# Patient Record
Sex: MALE | Race: BLACK OR AFRICAN AMERICAN | Employment: OTHER | URBAN - METROPOLITAN AREA
[De-identification: names, ages, dates, MRNs, and addresses within clinical notes are randomized per-mention and may not be internally consistent; named-entity substitution may affect disease eponyms.]

---

## 2017-10-27 ENCOUNTER — OFFICE VISIT (OUTPATIENT)
Dept: CARDIOLOGY CLINIC | Age: 32
End: 2017-10-27

## 2017-10-27 ENCOUNTER — CLINICAL SUPPORT (OUTPATIENT)
Dept: CARDIOLOGY CLINIC | Age: 32
End: 2017-10-27

## 2017-10-27 VITALS
RESPIRATION RATE: 18 BRPM | OXYGEN SATURATION: 96 % | BODY MASS INDEX: 47.46 KG/M2 | HEART RATE: 96 BPM | SYSTOLIC BLOOD PRESSURE: 108 MMHG | WEIGHT: 303 LBS | DIASTOLIC BLOOD PRESSURE: 78 MMHG

## 2017-10-27 VITALS — DIASTOLIC BLOOD PRESSURE: 72 MMHG | SYSTOLIC BLOOD PRESSURE: 120 MMHG

## 2017-10-27 DIAGNOSIS — R07.89 CHEST DISCOMFORT: Primary | ICD-10-CM

## 2017-10-27 DIAGNOSIS — E66.01 MORBIDLY OBESE (HCC): ICD-10-CM

## 2017-10-27 DIAGNOSIS — R07.9 CHEST PAIN, UNSPECIFIED TYPE: Primary | ICD-10-CM

## 2017-10-27 NOTE — MR AVS SNAPSHOT
Visit Information Date & Time Provider Department Dept. Phone Encounter #  
 10/27/2017  1:20 PM Simba Hayes MD [facility] [phone] [id number] Your Appointments 11/9/2017  3:00 PM  
ECHO CARDIOGRAMS 2D with YFN PAREKH  
[facility] ([facility]) Appt Note: STRESS ECHO PER  [address] [place] [address]  
[phone]  
  
   
 [address] [facility] [address] [facility] [address]  
  
    
 11/9/2017  3:00 PM  
STRESS ECHOCARDIOGRAMS with NANCY NEVAREZ  
[facility] ([facility]) Appt Note: STRESS ECHO PER  [address] [place] [address] [facility] [address] [address] [address] Upcoming Health Maintenance Date Due DTaP/Tdap/Td series (1 - Tdap) 1/21/2005 INFLUENZA AGE 9 TO ADULT 8/1/2017 Allergies as of 10/27/2017  Review Complete On: 10/27/2017 By: Jorge Dodson RN No Known Allergies Current Immunizations  Never Reviewed No immunizations on file. Not reviewed this visit Vitals BP Pulse Resp Weight(growth percentile) SpO2 BMI  
 108/78 96 18 303 lb (137.4 kg) 96% 47.46 kg/m2 Smoking Status Never Smoker BMI and BSA Data Body Mass Index Body Surface Area  
 47.46 kg/m 2 2.55 m 2 Preferred Pharmacy Pharmacy Name Phone [id number]- [facility] [address] [phone] Your Updated Medication List  
  
Notice  As of 10/27/2017  1:45 PM  
 You have not been prescribed any medications. Introducing [facility]! Mikel Nelson introduces [employer] patient portal. Now you can access parts of your medical record, email your doctor's office, and request medication refills online. 1. In your internet browser, go to [email or web address] 2. Click on the First Time User? Click Here link in the Sign In box. You will see the New Member Sign Up page. 3. Enter your Spurfly Access Code exactly as it appears below. You will not need to use this code after youve completed the sign-up process. If you do not sign up before the expiration date, you must request a new code. · Spurfly Access Code: [email or web address] Expires: 1/25/2018 11:44 AM 
 
4. Enter the last four digits of your Social Security Number (xxxx) and Date of Birth (mm/dd/yyyy) as indicated and click Submit. You will be taken to the next sign-up page. 5. Create a RightsFlowt ID. This will be your Spurfly login ID and cannot be changed, so think of one that is secure and easy to remember. 6. Create a Spurfly password. You can change your password at any time. 7. Enter your Password Reset Question and Answer. This can be used at a later time if you forget your password. 8. Enter your e-mail address. You will receive e-mail notification when new information is available in [address] 9. Click Sign Up. You can now view and download portions of your medical record. 10. Click the Download Summary menu link to download a portable copy of your medical information. If you have questions, please visit the Frequently Asked Questions section of the Spurfly website. Remember, Spurfly is NOT to be used for urgent needs. For medical emergencies, dial 911. Now available from your iPhone and Android! Please provide this summary of care documentation to your next provider. Your primary care clinician is listed as Emeka Avalos. If you have any questions after today's visit, please call [phone].

## 2017-10-27 NOTE — PROGRESS NOTES
[address]  [address] Sahil Carlos, Jr [place], [address] [facility]    Office [phone]  Fax [phone]  Cell [phone]        Shanice Kay is a 34 y.o. male referred for evaluation of chest pain. Consult requested by PAULETTE Quintanilla. Assessment  Encounter Diagnoses   Name Primary?  Chest pain, unspecified type Yes    Morbidly obese (HCC)        Recommendations:    Shanice Kay has exertional chest tightness in the setting of obesity but otherwise no other coronary risk factors at a young age. ETT today was objectively normal but subjectively abnormal with similar chest discomfort. His exam and EKG are unremarkable. Will evaluate further with stress echo to exclude HCM and myocardial ischemia due to coronary anomaly. If this is normal, I would encourage continued exercise and weight loss. He may have microvascular dysfunction. Phone follow up after reviewing tests    Subjective:    No prior cardiac hx. Suffers from obesity and trying to lose weight thru personal training. Patient noticed chest pain and palpitations onset 1 week ago while exercising. He subsequently rested and then started exercising again with recurrence of sxs as well as dizziness. He has since had residual, intermittent sharp chest pain. Pain is not worse with deep breathing. He had similar, less intense chest pain near the end of ETT today which resolved after resting. He did not have palpitations or dizziness during stress test.    He had been exercising thrice weekly with a  for the past 6 months but has started to increase the intensity and has noticed that he fatigues much faster that before. He also has noticed LAU walking up stairs. Patient does not take any medications. He does not smoke or drink. He denies a hx of sleep apnea. He does not believe he has a family hx of CAD but notes that his daughter has a heart murmur.  He denies any hx of asthma, airway issues, or GERD. Patient denies any  syncope, orthopnea, edema or paroxysmal nocturnal dyspnea. Patient is a [facility] [employer] .     Patient has beta thalassemia minor and states he was anemic by blood test 1 week ago. Cardiac risk factors   HTN no  DM no  Smoking no  Family hx of CAD no    Cardiac testing  ETT 10/27/17 - 6 minutes, normal study    Past Medical History:   Diagnosis Date    Beta thalassemia minor             No Known Allergies       Review of Systems  Constitutional: Negative for fever, chills, and diaphoresis. +exertional fatigue  Respiratory: Negative for cough, hemoptysis, sputum production, shortness of breath and wheezing. Cardiovascular: Negative for orthopnea, claudication, leg swelling and PND. +intermittent chest pain. +episode of palpitaitons  Gastrointestinal: Negative for heartburn, nausea, vomiting, blood in stool and melena. Genitourinary: Negative for dysuria and flank pain. Musculoskeletal: Negative for joint pain and back pain. Skin: Negative for rash. Neurological: Negative for focal weakness, seizures, loss of consciousness, weakness and headaches. +episode of dizziness  Endo/Heme/Allergies: Does not bruise/bleed easily. Psychiatric/Behavioral: Negative for memory loss. The patient does not have insomnia. Physical Exam    Visit Vitals    /78    Pulse 96    Resp 18    Wt 303 lb (137.4 kg)    SpO2 96%    BMI 47.46 kg/m2     Wt Readings from Last 3 Encounters:   10/27/17 303 lb (137.4 kg)   11/20/13 295 lb (133.8 kg)      General - well developed well nourished, morbidly obese BM  Neck - JVP normal, thyroid nl  Cardiac - normal S1, S2, no murmurs, rubs or gallops.  No clicks  Vascular - carotids without bruits, radials, femorals and pedal pulses equal bilateral  Lungs - clear to auscultation bilaterals, no rales, wheezing or rhonchi  Abd - soft nontender, no HSM, no abd bruits  Extremities - no edema  Skin - no rash  Neuro - nonfocal  Psych - normal mood and affect      Cardiographics  ETT 10/27/17 - 6 minutes, normal study  EKG 10/27/17- SR, normal    Written by Alberto Saldivar, as dictated by Wes Schultz MD.  Wes Schultz MD

## 2017-10-27 NOTE — PROGRESS NOTES
Explained procedure to patient, monitoring EKG/HR/BP, walking on treadmill,  and risks/discomforts. All concerns and questions addressed. Consent obtained. Pt works out with a . A few days ago experienced some dull chest pain, palpitations, and dizziness. Pt complained of a dull left sided chest pain at peak exercise (8.0METS), which subsided at rest. Pt SpO2 at rest 98%, peak exercise 95%, returned to 98% in recovery. See scanned report. Dr. Vazquez Elkins ordered and Dr. Vazquez Elkins read study. ID verified per protocol. Pt  reported no symptoms at completion of protocol.

## 2017-11-09 ENCOUNTER — CLINICAL SUPPORT (OUTPATIENT)
Dept: CARDIOLOGY CLINIC | Age: 32
End: 2017-11-09

## 2017-11-09 DIAGNOSIS — E66.01 MORBIDLY OBESE (HCC): ICD-10-CM

## 2017-11-09 DIAGNOSIS — R07.9 CHEST PAIN, UNSPECIFIED TYPE: Primary | ICD-10-CM

## 2017-11-09 NOTE — PROGRESS NOTES
See scanned report. Dr. Julissa Spain ordered study and Dr. Julissa Spain read study. ID verified per protocol. Test and risks explained. Consent signed after all patient questions answered. Patient developed dyspnea during test. At 4 minutes in recovery, patient without symptoms or complaints voiced.

## 2017-11-13 ENCOUNTER — TELEPHONE (OUTPATIENT)
Dept: CARDIOLOGY CLINIC | Age: 32
End: 2017-11-13

## 2017-11-13 NOTE — TELEPHONE ENCOUNTER
Cardiac testing  ETT 10/27/17 - 6 minutes, normal study  Exercise Stress Echo 11/9/17 - 6:33. Normal study. EF 60%. Mr. Patricia Garcia was seen as a new patient by Dr. Beth Leong on 10/27/17 for evaluation of chest pain. Referred by PAULETTE Guaman. I have notified him that the Exercise stress Echo showed normal wall motion and normal EF. No concern for myocardial ischemia. I have encouraged him to continue to exercise and follow a heart healthy diet in order to optimize weight loss. Follow up with his PCP on a regular basis.

## 2019-10-01 ENCOUNTER — OFFICE VISIT (OUTPATIENT)
Dept: SLEEP MEDICINE | Age: 34
End: 2019-10-01

## 2019-10-01 VITALS
OXYGEN SATURATION: 97 % | WEIGHT: 315 LBS | RESPIRATION RATE: 16 BRPM | HEART RATE: 79 BPM | BODY MASS INDEX: 49.44 KG/M2 | HEIGHT: 67 IN | DIASTOLIC BLOOD PRESSURE: 78 MMHG | SYSTOLIC BLOOD PRESSURE: 118 MMHG

## 2019-10-01 DIAGNOSIS — G47.33 OSA (OBSTRUCTIVE SLEEP APNEA): Primary | ICD-10-CM

## 2019-10-01 DIAGNOSIS — E66.2 HYPOVENTILATION ASSOCIATED WITH OBESITY SYNDROME (HCC): ICD-10-CM

## 2019-10-01 RX ORDER — BISMUTH SUBSALICYLATE 262 MG
1 TABLET,CHEWABLE ORAL DAILY
COMMUNITY
End: 2021-11-08

## 2019-10-01 NOTE — PATIENT INSTRUCTIONS
[address] [facility] [place] [place], [address]  Tel.  [phone]  Fax. [address] [facility] 60  [place], [address] [facility]  Tel.  [phone]  Fax. [phone] [address]  Tel.  [phone]  Fax. [phone]     Sleep Apnea: After Your Visit  Your Care Instructions  Sleep apnea occurs when you frequently stop breathing for 10 seconds or longer during sleep. It can be mild to severe, based on the number of times per hour that you stop breathing or have slowed breathing. Blocked or narrowed airways in your nose, mouth, or throat can cause sleep apnea. Your airway can become blocked when your throat muscles and tongue relax during sleep. Sleep apnea is common, occurring in 1 out of 20 individuals. Individuals having any of the following characteristics should be evaluated and treated right away due to high risk and detrimental consequences from untreated sleep apnea:  1. Obesity  2. Congestive Heart failure  3. Atrial Fibrillation  4. Uncontrolled Hypertension  5. Type II Diabetes  6. Night-time Arrhythmias  7. Stroke  8. Pulmonary Hypertension  9. High-risk Driving Populations (pilots, truck drivers, etc.)  10. Patients Considering Weight-loss Surgery    How do you know you have sleep apnea? You probably have sleep apnea if you answer 'yes' to 3 or more of the following questions:  S - Have you been told that you Snore? T - Are you often Tired during the day? O - Has anyone Observed you stop breathing while sleeping? P- Do you have (or are being treated for) high blood Pressure? B - Are you obese (Body Mass Index > 35)? A - Is your Age 48years old or older? N - Is your Neck size greater than 16 inches? G - Are you male Gender? A sleep physician can prescribe a breathing device that prevents tissues in the throat from blocking your airway.  Or your doctor may recommend using a dental device (oral breathing device) to help keep your airway open. In some cases, surgery may be needed to remove enlarged tissues in the throat. Follow-up care is a key part of your treatment and safety. Be sure to make and go to all appointments, and call your doctor if you are having problems. It's also a good idea to know your test results and keep a list of the medicines you take. How can you care for yourself at home? · Lose weight, if needed. It may reduce the number of times you stop breathing or have slowed breathing. · Go to bed at the same time every night. · Sleep on your side. It may stop mild apnea. If you tend to roll onto your back, sew a pocket in the back of your pajama top. Put a tennis ball into the pocket, and stitch the pocket shut. This will help keep you from sleeping on your back. · Avoid alcohol and medicines such as sleeping pills and sedatives before bed. · Do not smoke. Smoking can make sleep apnea worse. If you need help quitting, talk to your doctor about stop-smoking programs and medicines. These can increase your chances of quitting for good. · Prop up the head of your bed 4 to 6 inches by putting bricks under the legs of the bed. · Treat breathing problems, such as a stuffy nose, caused by a cold or allergies. · Use a continuous positive airway pressure (CPAP) breathing machine if lifestyle changes do not help your apnea and your doctor recommends it. The machine keeps your airway from closing when you sleep. · If CPAP does not help you, ask your doctor whether you should try other breathing machines. A bilevel positive airway pressure machine has two types of air pressureâone for breathing in and one for breathing out. Another device raises or lowers air pressure as needed while you breathe. · If your nose feels dry or bleeds when using one of these machines, talk with your doctor about increasing moisture in the air. A humidifier may help.   · If your nose is runny or stuffy from using a breathing machine, talk with your doctor about using decongestants or a corticosteroid nasal spray. When should you call for help? Watch closely for changes in your health, and be sure to contact your doctor if:  · You still have sleep apnea even though you have made lifestyle changes. · You are thinking of trying a device such as CPAP. · You are having problems using a CPAP or similar machine. Where can you learn more? Go to Aidin. Enter V092 in the search box to learn more about \"Sleep Apnea: After Your Visit. \"   © 9928-8849 Healthwise, Incorporated. Care instructions adapted under license by [facility] (which disclaims liability or warranty for this information). This care instruction is for use with your licensed healthcare professional. If you have questions about a medical condition or this instruction, always ask your healthcare professional. Jude Aggarwalara any warranty or liability for your use of this information. PROPER SLEEP HYGIENE    What to avoid  · Do not have drinks with caffeine, such as coffee or black tea, for 8 hours before bed. · Do not smoke or use other types of tobacco near bedtime. Nicotine is a stimulant and can keep you awake. · Avoid drinking alcohol late in the evening, because it can cause you to wake in the middle of the night. · Do not eat a big meal close to bedtime. If you are hungry, eat a light snack. · Do not drink a lot of water close to bedtime, because the need to urinate may wake you up during the night. · Do not read or watch TV in bed. Use the bed only for sleeping and sexual activity. What to try  · Go to bed at the same time every night, and wake up at the same time every morning. Do not take naps during the day. · Keep your bedroom quiet, dark, and cool. · Get regular exercise, but not within 3 to 4 hours of your bedtime. .  · Sleep on a comfortable pillow and mattress.   · If watching the clock makes you anxious, turn it facing away from you so you cannot see the time. · If you worry when you lie down, start a worry book. Well before bedtime, write down your worries, and then set the book and your concerns aside. · Try meditation or other relaxation techniques before you go to bed. · If you cannot fall asleep, get up and go to another room until you feel sleepy. Do something relaxing. Repeat your bedtime routine before you go to bed again. · Make your house quiet and calm about an hour before bedtime. Turn down the lights, turn off the TV, log off the computer, and turn down the volume on music. This can help you relax after a busy day. Drowsy Driving  The [address] [place] [address] Road Traffic Safety Administration cites drowsiness as a causing factor in more than 857,492 police reported crashes annually, resulting in 76,000 injuries and 1,500 deaths. Other surveys suggest 55% of people polled have driven while drowsy in the past year, 23% had fallen asleep but not crashed, 3% crashed, and 2% had and accident due to drowsy driving. Who is at risk? Young Drivers: One study of drowsy driving accidents states that 55% of the drivers were under 25 years. Of those, 75% were male. Shift Workers and Travelers: People who work overnight or travel across time zones frequently are at higher risk of experiencing Circadian Rhythm Disorders. They are trying to work and function when their body is programed to sleep. Sleep Deprived: Lack of sleep has a serious impact on your ability to pay attention or focus on a task. Consistently getting less than the average of 8 hours your body needs creates partial or cumulative sleep deprivation. Untreated Sleep Disorders: Sleep Apnea, Narcolepsy, R.L.S., and other sleep disorders (untreated) prevent a person from getting enough restful sleep. This leads to excessive daytime sleepiness and increases the risk for drowsy driving accidents by up to 7 times.   Medications / Alcohol: Even over the counter medications can cause drowsiness. Medications that impair a drivers attention should have a warning label. Alcohol naturally makes you sleepy and on its own can cause accidents. Combined with excessive drowsiness its effects are amplified. Signs of Drowsy Driving:   * You don't remember driving the last few miles   * You may drift out of your afshan   * You are unable to focus and your thoughts wander   * You may yawn more often than normal   * You have difficulty keeping your eyes open / nodding off   * Missing traffic signs, speeding, or tailgating  Prevention-   Good sleep hygiene, lifestyle and behavioral choices have the most impact on drowsy driving. There is no substitute for sleep and the average person requires 8 hours nightly. If you find yourself driving drowsy, stop and sleep. Consider the sleep hygiene tips provided during your visit as well. Medication Refill Policy: Refills for all medications require 1 week advance notice. Please have your pharmacy fax a refill request. We are unable to fax, or call in \"controled substance\" medications and you will need to pick these prescriptions up from our office. [employer] Activation    Thank you for requesting access to [employer]. Please follow the instructions below to securely access and download your online medical record. [employer] allows you to send messages to your doctor, view your test results, renew your prescriptions, schedule appointments, and more. How Do I Sign Up? 1. In your internet browser, go to [email or web address]. 2. Click on the First Time User? Click Here link in the Sign In box. You will see the New Member Sign Up page. 3. Enter your [employer] Access Code exactly as it appears below. You will not need to use this code after youve completed the sign-up process. If you do not sign up before the expiration date, you must request a new code.     [employer] Access Code: [email or web address]  Expires: 11/15/2019  4:59 PM (This is the date your Impakt Protective access code will )    4. Enter the last four digits of your Social Security Number (xxxx) and Date of Birth (mm/dd/yyyy) as indicated and click Submit. You will be taken to the next sign-up page. 5. Create a Cross River Fibert ID. This will be your Impakt Protective login ID and cannot be changed, so think of one that is secure and easy to remember. 6. Create a Impakt Protective password. You can change your password at any time. 7. Enter your Password Reset Question and Answer. This can be used at a later time if you forget your password. 8. Enter your e-mail address. You will receive e-mail notification when new information is available in [address] 9. Click Sign Up. You can now view and download portions of your medical record. 10. Click the Download Summary menu link to download a portable copy of your medical information. Additional Information    If you have questions, please call [phone]. Remember, Impakt Protective is NOT to be used for urgent needs. For medical emergencies, dial 911.

## 2019-10-01 NOTE — PROGRESS NOTES
[address] [facility] [place]. [place], [address]  Tel.  [phone]  Fax. 100 [facility]  [place], [address] [facility]  Tel.  [phone]  Fax. [phone] [address]  Tel.  [phone]  Fax. [phone]         Subjective:      Sandy Tabares is an 27 y.o. male referred for evaluation for a sleep disorder. He complains of snoring associated with snorting, periods of not breathing, tossing and turning, excessive daytime sleepiness. Symptoms began several years ago, gradually worsening since that time. He usually can fall asleep in 5 minutes. Family or house members note snoring, periods of not breathing. He denies completely or partially paralyzed while falling asleep or waking up. Sandy Tabares does wake up frequently at night. He is not bothered by waking up too early and left unable to get back to sleep. He actually sleeps about 6 hours at night and wakes up about 3 times during the night. He does not work shifts:  .   Zeb Marks indicates he does get too little sleep at night. His bedtime is 2300. He awakens at 0730. He does not take naps. He has the following observed behaviors: Light snoring, Loud snoring; Nightmares. Other remarks: Vivid dreams and waking witha gasp or snort    [place] Sleepiness Score: 13 which reflect moderate daytime drowsiness. Allergies   Allergen Reactions    Doxycycline Other (comments)     Burning throat and ears         Current Outpatient Medications:     multivitamin (ONE A DAY) tablet, Take 1 Tab by mouth daily. , Disp: , Rfl:      He  has a past medical history of Anemia, Beta 0 thalassemia ([address] and Beta thalassemia minor. He  has no past surgical history on file. He family history includes Cancer in his father. He  reports that he has never smoked. He has never used smokeless tobacco. He reports that he does not drink alcohol or use drugs.      Review of Systems:  Constitutional: No significant weight loss or weight gain  Eyes:  No blurred vision  CVS:  No significant chest pain  Pulm:  No significant shortness of breath  GI:  No significant nausea or vomiting  :  No significant nocturia  Musculoskeletal:  significant joint pain at night  Skin:  No significant rashes  Neuro:  No significant dizziness   Psych:  No active mood issues    Sleep Review of Systems: notable for no difficulty falling asleep; infrequent awakenings at night;  regular dreaming noted; no nightmares ; no early morning headaches; no memory problems; no concentration issues; no history of any automobile or occupational accidents due to daytime drowsiness. Objective:     Visit Vitals  /78 (BP 1 Location: Left arm, BP Patient Position: Sitting)   Pulse 79   Resp 16   Ht 5' 7\" (1.702 m)   Wt 319 lb (144.7 kg)   SpO2 97%   BMI 49.96 kg/m²         General:   Not in acute distress   Eyes:  Anicteric sclerae, no obvious strabismus   Nose:  No obvious nasal septum deviation    Oropharynx:   Class 4 oropharyngeal outlet, thick tongue base, uvula could not be seen due to low-lying soft palate, narrow tonsilo-pharyngeal pilars   Tonsils:   tonsils are not seen due to low-lying soft palate   Neck:   Neck circ. in \"inches\": 16.5; midline trachea   Chest/Lungs:  Equal lung expansion, clear on auscultation    CVS:  Normal rate, regular rhythm; no JVD   Skin:  Warm to touch; no obvious rashes   Neuro:  No focal deficits ; no obvious tremor    Psych:  Normal affect,  normal countenance;          Assessment:       ICD-10-CM ICD-9-CM    1. GUDELIA (obstructive sleep apnea) G47.33 327.23 POLYSOMNOGRAPHY 1 NIGHT   2. Hypoventilation associated with obesity syndrome (HCC) E66.2 278.03 POLYSOMNOGRAPHY 1 NIGHT   3. BMI 45.0-49.9, adult [facility]) Z68.42 V85.42          Plan:     * The patient currently has a Moderate Risk for having sleep apnea. STOP-BANG score 5.  * Sleep testing was ordered for initial evaluation.     * He was provided information on sleep apnea including coresponding risk factors and the importance of proper treatment. * Treatment options if indicated were reviewed today. Patient agrees to a trial of PAP therapy if indicated. * Counseling was provided regarding proper sleep hygiene (including effect of light on sleep) and safe driving. * Effect of sleep disturbance on weight was reviewed. We have recommended a dedicated weight loss through appropriate diet and an exercise regiment as significant weight reduction has been shown to reduce severity of obstructive sleep apnea. * Patient agrees to telephone [phone]  follow-up by myself or lead sleep technologist shortly after sleep study to review results and plan final management.     (patient has given permission for a message to be left regarding test results and further management if patient cannot be cannot be reached directly). Thank you for allowing us to participate in your patient's medical care. We'll keep you updated on these investigations. Kenton Collazo MD, [facility]  Electronically signed.  10/01/19

## 2019-12-09 ENCOUNTER — HOSPITAL ENCOUNTER (OUTPATIENT)
Dept: SLEEP MEDICINE | Age: 34
Discharge: HOME | End: 2019-12-09
Payer: COMMERCIAL

## 2019-12-09 VITALS
DIASTOLIC BLOOD PRESSURE: 75 MMHG | BODY MASS INDEX: 49.44 KG/M2 | SYSTOLIC BLOOD PRESSURE: 119 MMHG | HEIGHT: 67 IN | WEIGHT: 315 LBS | OXYGEN SATURATION: 97 % | TEMPERATURE: 97.5 F | HEART RATE: 94 BPM

## 2019-12-09 DIAGNOSIS — G47.33 OSA (OBSTRUCTIVE SLEEP APNEA): ICD-10-CM

## 2019-12-09 DIAGNOSIS — E66.2 HYPOVENTILATION ASSOCIATED WITH OBESITY SYNDROME (HCC): ICD-10-CM

## 2019-12-09 PROCEDURE — 95811 POLYSOM 6/>YRS CPAP 4/> PARM: CPT | Performed by: INTERNAL MEDICINE

## 2019-12-09 PROCEDURE — 95810 POLYSOM 6/> YRS 4/> PARAM: CPT | Performed by: INTERNAL MEDICINE

## 2019-12-10 ENCOUNTER — TELEPHONE (OUTPATIENT)
Dept: SLEEP MEDICINE | Age: 34
End: 2019-12-10

## 2019-12-10 DIAGNOSIS — G47.33 OSA (OBSTRUCTIVE SLEEP APNEA): Primary | ICD-10-CM

## 2019-12-10 NOTE — TELEPHONE ENCOUNTER
Lucie Hatfield. is to be contacted by lead sleep technologist regarding results of Sleep Testing which was indicative of an average AHI of 83 per hour with an SpO2 samia of 82% and SpO2 of < 88% being 7.9 minutes. Patient met criteria for a Split-night Test and a titration was preformed and the patient was titrated successfully on PAP Therapy.       A CPAP prescription has been written and patient will be contacted by office staff regarding follow-up  in 2-3 months after initiation of therapy.       Encounter Diagnosis   Name Primary?  GUDELIA (obstructive sleep apnea) Yes       Orders Placed This Encounter    AMB SUPPLY ORDER     Primary Encounter Diagnosis: Obstructive Sleep Apnea  (G47.33)    ResMed Device with Heated Humidifer [id number] / [id number]. Positive Airway Pressure Therapy: Duration of need: 99 months. Set Pressure: 8 cmH2O     Nasal Cushion (Replace) 2 per month.  Nasal Interface Mask 1 every 3 months.  Headgear 1 every 6 months.  Filter(s) Disposable 2 per month.  Filter(s) Non-Disposable 1 every 6 months. [address] [facility] (Replace) 1 every 6 months.  Tubing with heating element 1 every 3 months. Perform Mask Fitting per patient preference and comfort - replace as above. Clarisse Nesbitt MD, [facility]; NPI: [id number]  Electronically signed.  12/10/19

## 2020-01-07 ENCOUNTER — TELEPHONE (OUTPATIENT)
Dept: SLEEP MEDICINE | Age: 35
End: 2020-01-07

## 2020-01-07 ENCOUNTER — DOCUMENTATION ONLY (OUTPATIENT)
Dept: SLEEP MEDICINE | Age: 35
End: 2020-01-07

## 2020-01-07 NOTE — TELEPHONE ENCOUNTER
Patient called to review result. I read the result interpretation. Patient verbalized understanding and wanted the PAP order be faxed to DME.

## 2020-04-21 ENCOUNTER — VIRTUAL VISIT (OUTPATIENT)
Dept: SLEEP MEDICINE | Age: 35
End: 2020-04-21

## 2020-04-21 VITALS — BODY MASS INDEX: 48.65 KG/M2 | WEIGHT: 310 LBS | HEIGHT: 67 IN

## 2020-04-21 DIAGNOSIS — G47.33 OSA (OBSTRUCTIVE SLEEP APNEA): Primary | ICD-10-CM

## 2020-04-21 NOTE — PATIENT INSTRUCTIONS
[address] [place] [facility], [address] Tel.  [phone] Fax. 100 [facility] 60 [place], [address] [place] Tel.  [phone] Fax. [phone] [address] [facility] [address] Tel.  [phone] Fax. [phone] Learning About CPAP for Sleep Apnea What is CPAP? CPAP is a small machine that you use at home every night while you sleep. It increases air pressure in your throat to keep your airway open. When you have sleep apnea, this can help you sleep better so you feel much better. CPAP stands for \"continuous positive airway pressure. \" The CPAP machine will have one of the following: · A mask that covers your nose and mouth · Prongs that fit into your nose · A mask that covers your nose only, the most common type. This type is called NCPAP. The N stands for \"nasal.\" Why is it done? CPAP is usually the best treatment for obstructive sleep apnea. It is the first treatment choice and the most widely used. Your doctor may suggest CPAP if you have: · Moderate to severe sleep apnea. · Sleep apnea and coronary artery disease (CAD) or heart failure. How does it help? · CPAP can help you have more normal sleep, so you feel less sleepy and more alert during the daytime. · CPAP may help keep heart failure or other heart problems from getting worse. · NCPAP may help lower your blood pressure. · If you use CPAP, your bed partner may also sleep better because you are not snoring or restless. What are the side effects? Some people who use CPAP have: · A dry or stuffy nose and a sore throat. · Irritated skin on the face. · Sore eyes. · Bloating. If you have any of these problems, work with your doctor to fix them. Here are some things you can try: · Be sure the mask or nasal prongs fit well. · See if your doctor can adjust the pressure of your CPAP. · If your nose is dry, try a humidifier. · If your nose is runny or stuffy, try decongestant medicine or a steroid nasal spray. If these things do not help, you might try a different type of machine. Some machines have air pressure that adjusts on its own. Others have air pressures that are different when you breathe in than when you breathe out. This may reduce discomfort caused by too much pressure in your nose. Where can you learn more? Go to Mobcart.be Enter M890 in the search box to learn more about \"Learning About CPAP for Sleep Apnea. \"  
© 9143-9076 Healthwise, Incorporated. Care instructions adapted under license by [employer] (which disclaims liability or warranty for this information). This care instruction is for use with your licensed healthcare professional. If you have questions about a medical condition or this instruction, always ask your healthcare professional. [address] any warranty or liability for your use of this information. Content Version: 7.8.01605; Last Revised: January 11, 2010 PROPER SLEEP HYGIENE What to avoid · Do not have drinks with caffeine, such as coffee or black tea, for 8 hours before bed. · Do not smoke or use other types of tobacco near bedtime. Nicotine is a stimulant and can keep you awake. · Avoid drinking alcohol late in the evening, because it can cause you to wake in the middle of the night. · Do not eat a big meal close to bedtime. If you are hungry, eat a light snack. · Do not drink a lot of water close to bedtime, because the need to urinate may wake you up during the night. · Do not read or watch TV in bed. Use the bed only for sleeping and sexual activity. What to try · Go to bed at the same time every night, and wake up at the same time every morning. Do not take naps during the day. · Keep your bedroom quiet, dark, and cool. · Get regular exercise, but not within 3 to 4 hours of your bedtime. Tom Alfaro · Sleep on a comfortable pillow and mattress. · If watching the clock makes you anxious, turn it facing away from you so you cannot see the time. · If you worry when you lie down, start a worry book. Well before bedtime, write down your worries, and then set the book and your concerns aside. · Try meditation or other relaxation techniques before you go to bed. · If you cannot fall asleep, get up and go to another room until you feel sleepy. Do something relaxing. Repeat your bedtime routine before you go to bed again. · Make your house quiet and calm about an hour before bedtime. Turn down the lights, turn off the TV, log off the computer, and turn down the volume on music. This can help you relax after a busy day. Drowsy Driving: The [facility] cites drowsiness as a causing factor in more than 327,007 police reported crashes annually, resulting in 76,000 injuries and 1,500 deaths. Other surveys suggest 55% of people polled have driven while drowsy in the past year, 23% had fallen asleep but not crashed, 3% crashed, and 2% had and accident due to drowsy driving. Who is at risk? Young Drivers: One study of drowsy driving accidents states that 55% of the drivers were under 25 years. Of those, 75% were male. Shift Workers and Travelers: People who work overnight or travel across time zones frequently are at higher risk of experiencing Circadian Rhythm Disorders. They are trying to work and function when their body is programed to sleep. Sleep Deprived: Lack of sleep has a serious impact on your ability to pay attention or focus on a task. Consistently getting less than the average of 8 hours your body needs creates partial or cumulative sleep deprivation.   
Untreated Sleep Disorders: Sleep Apnea, Narcolepsy, R.L.S., and other sleep disorders (untreated) prevent a person from getting enough restful sleep. This leads to excessive daytime sleepiness and increases the risk for drowsy driving accidents by up to 7 times. Medications / Alcohol: Even over the counter medications can cause drowsiness. Medications that impair a drivers attention should have a warning label. Alcohol naturally makes you sleepy and on its own can cause accidents. Combined with excessive drowsiness its effects are amplified. Signs of Drowsy Driving: * You don't remember driving the last few miles * You may drift out of your afshan * You are unable to focus and your thoughts wander * You may yawn more often than normal 
 * You have difficulty keeping your eyes open / nodding off * Missing traffic signs, speeding, or tailgating Prevention-  
Good sleep hygiene, lifestyle and behavioral choices have the most impact on drowsy driving. There is no substitute for sleep and the average person requires 8 hours nightly. If you find yourself driving drowsy, stop and sleep. Consider the sleep hygiene tips provided during your visit as well. Medication Refill Policy: Refills for all medications require 1 week advance notice. Please have your pharmacy fax a refill request. We are unable to fax, or call in \"controled substance\" medications and you will need to pick these prescriptions up from our office. BizGreet Activation Thank you for requesting access to BizGreet. Please follow the instructions below to securely access and download your online medical record. BizGreet allows you to send messages to your doctor, view your test results, renew your prescriptions, schedule appointments, and more. How Do I Sign Up? 1. In your internet browser, go to [email or web address]. 2. Click on the First Time User? Click Here link in the Sign In box. You will see the New Member Sign Up page. 3. Enter your BizGreet Access Code exactly as it appears below.  You will not need to use this code after youve completed the sign-up process. If you do not sign up before the expiration date, you must request a new code. NanoDynamics Access Code: Activation code not generated Current NanoDynamics Status: Active (This is the date your NanoDynamics access code will ) 4. Enter the last four digits of your Social Security Number (xxxx) and Date of Birth (mm/dd/yyyy) as indicated and click Submit. You will be taken to the next sign-up page. 5. Create a OnHandt ID. This will be your NanoDynamics login ID and cannot be changed, so think of one that is secure and easy to remember. 6. Create a NanoDynamics password. You can change your password at any time. 7. Enter your Password Reset Question and Answer. This can be used at a later time if you forget your password. 8. Enter your e-mail address. You will receive e-mail notification when new information is available in [address]. 9. Click Sign Up. You can now view and download portions of your medical record. 10. Click the Download Summary menu link to download a portable copy of your medical information. Additional Information If you have questions, please call [phone]. Remember, NanoDynamics is NOT to be used for urgent needs. For medical emergencies, dial 911.

## 2020-04-21 NOTE — PROGRESS NOTES
[address] [place]. [place], [address]  Tel.  [phone]  Fax. 100 [facility] 60  [place], [address]  Tel.  [phone]  Fax. [phone] [address] [facility] [address]   Tel.  [phone]  Fax. [phone]       S>    Gerardine Klinefelter. is a 38 y.o. male who was seen by synchronous (real-time) audio-video technology on 4/21/2020. Consent:  He and/or his healthcare decision maker is aware that this patient-initiated Telehealth encounter is a billable service, with coverage as determined by his insurance carrier. He is aware that he may receive a bill and has provided verbal consent to proceed: Yes    I was at home while conducting this encounter. Patient verified with 's License. He reports no problems using the device. He is 70% compliant over the past 30 days. The following problems are identified:    Drowsiness no Problems exhaling no   Snoring no Forget to put on no   Mask Comfortable yes Can't fall asleep no   Dry Mouth no Mask falls off no   Air Leaking no Frequent awakenings no         He admits that his sleep has improved on PAP therapy using under the nose mask and non-heated tubing. Allergies   Allergen Reactions    Doxycycline Other (comments)     Burning throat and ears       He has a current medication list which includes the following prescription(s): multivitamin. Neo Chakraborty He  has a past medical history of Anemia, Beta 0 thalassemia ([address] and Beta thalassemia minor. Bradley Sleepiness Score: 4   and Modified F.O.S.Q. Score Total / 2: 15.5   which reflect improved sleep quality over therapy time. O>    Vitals reported by patient to Medical Assistant    Visit Vitals  Ht 5' 7\" (1.702 m)   Wt 310 lb (140.6 kg)   BMI 48.55 kg/m²          Physical Exam completed by visual and auditory observation of patient with verbal input from patient.     General:   Alert, oriented, not in acute distress   Eyes:  Anicteric Sclerae; no obvious strabismus   Nose:  No obvious nasal septum deviation    Neck:   Midline trachea, no visible mass   Chest/Lungs:  Respiratory effort normal, no visualized signs of difficulty breathing or respiratory distress   CVS:  No JVD   Extremities:  No obvious rashes noted on face, neck, or hands   Neuro:  No facial asymmetry, no focal deficits; no obvious tremor    Psych:  Normal affect,  normal countenance         A>    ICD-10-CM ICD-9-CM    1. GUDELIA (obstructive sleep apnea) G47.33 327.23    2. BMI 45.0-49.9, adult ([facility] Z68.42 V85.42      AHI = 83 (2019). On Resmed :  4 - 20 cmH2O. Compliant:      yes    Therapeutic Response:  Positive    P>    * Patient is using his PAP device regularly and benefiting form therapy,  continued use of the device at 4-20 cmH2O is advised. * He is familiar with the telephone monitoring application, is willing to track therapy and agrees to notify use if AHI is >5 per hour. * He is aware of the relationship between GUDELIA and HTN which is stable. * We have recommended a dedicated weight loss through appropriate diet and an exercise regiment as significant weight reduction has been shown to reduce severity of obstructive sleep apnea. *   Follow-up and Dispositions    · Return in about 1 year (around 4/21/2021), or if symptoms worsen or fail to improve. * He was asked to contact our office for any problems regarding PAP therapy. * Counseling was provided regarding the importance of regular PAP use and on proper sleep hygiene and safe driving. * Re-enforced proper and regular cleaning for the device. Thank you for allowing us to participate in your patient's medical care.     Pursuant to the emergency declaration under the [facility] waiver authority and the [employer] and [employer] Act, this Virtual  Visit was conducted, with patient's consent, to reduce the patient's risk of exposure to COVID-19 and provide continuity of care for an established patient. Services were provided through a video synchronous discussion virtually to substitute for in-person clinic visit. Dalila Lang MD, FAASM  Electronically signed.  04/21/20

## 2020-09-24 ENCOUNTER — VIRTUAL VISIT (OUTPATIENT)
Dept: FAMILY MEDICINE CLINIC | Age: 35
End: 2020-09-24
Payer: COMMERCIAL

## 2020-09-24 DIAGNOSIS — E66.01 MORBIDLY OBESE (HCC): Primary | ICD-10-CM

## 2020-09-24 DIAGNOSIS — Z13.1 SCREENING FOR DIABETES MELLITUS: ICD-10-CM

## 2020-09-24 DIAGNOSIS — Z13.220 SCREENING FOR HYPERLIPIDEMIA: ICD-10-CM

## 2020-09-24 PROCEDURE — 99214 OFFICE O/P EST MOD 30 MIN: CPT | Performed by: FAMILY MEDICINE

## 2020-09-24 NOTE — PROGRESS NOTES
Adonis pt with two pt identifiers(name and ). Reviewed record in preparation for visit and have obtained necessary documentation. No chief complaint on file. Health Maintenance Due   Topic    DTaP/Tdap/Td series (1 - Tdap)    Flu Vaccine (1)       There were no vitals taken for this visit. Coordination of Care Questionnaire:  :   1) Have you been to an emergency room, urgent care, or hospitalized since your last visit? NO      2. Have seen or consulted any other health care provider since your last visit? NO    Patient is accompanied by  I have received verbal consent from Ike Smith. to discuss any/all medical information while they are present in the room.

## 2020-09-24 NOTE — PROGRESS NOTES
Chiqui King, who was evaluated through a synchronous (real-time) audio-video encounter, and/or his healthcare decision maker, is aware that it is a billable service, with coverage as determined by his insurance carrier. He provided verbal consent to proceed: Yes, and patient identification was verified. It was conducted pursuant to the emergency declaration under the [facility], [facility] authority and the [email or web address] General Act. A caregiver was present when appropriate. Ability to conduct physical exam was limited. I was at home. The patient was at home. [facility] Progress Note: Initial Physician Visit     Chiqui King is a 38 y.o. male who is here for medical screening for entering the [facility] Weight Loss Program.     CC:  Obesity  He has GUDELIA and uses the device each night  Sleeps 7.5 hours      Weight History  I personally reviewed the [place] completed by patient and scanned into media section of chart. It includes duration of their obesity, maximum weight, goal weight and weight gain time line (timing), all of which give the context of their obesity AND a Family History of their obesity. Is their Weight Loss Goal entered in to Comments? His sleep doc said lose 50 lbs    Weight loss History  I personally reviewed the Medical Screening Angel Mora completed by the patient and scanned into media section of chart. It includes number of weight loss attempts, the weight loss program that patients was most successful using, and if they have any hx of anorectic medication use, including OTC supplements. This captures modifying factors.     Significant Medical History  Past Medical History:   Diagnosis Date    Anemia     Beta 0 thalassemia (HCC)     Beta thalassemia minor     Beta thalassemia minor 2012       I personally reviewed the Medical Screening Dez Pean completed by the patient and scanned into media section of chart. This allows me to assess associated symptoms that are significant in the assessment of the patient's obesity and the patient's Past Medical History. SLEEP: 7.5        Significant Psychosocial History   Has a doctor every diagnosed with Binge Eating Disorder, Bulemia or Anorexia? : no     Compliance  Upcoming Travel? no    Social History  Social History     Tobacco Use    Smoking status: Never Smoker    Smokeless tobacco: Never Used   Substance Use Topics    Alcohol use: No       Exercise  I personally reviewed the Medical Screening Dez Pean completed by the patient and scanned into media section of chart. Review of Systems  See HPI        Objective  There were no vitals taken for this visit. Weight Metrics 4/21/2020 12/9/2019 10/1/2019 10/27/2017 11/20/2013   Weight 310 lb 321 lb 6.4 oz 319 lb 303 lb 295 lb   BMI 48.55 kg/m2 50.34 kg/m2 49.96 kg/m2 47.46 kg/m2 46.19 kg/m2       Labs: See  labs scanned into Media section or in lab section of record      Physical Exam    Vital Signs Reviewed  Weight Management Metrics Reviewed    Appearance: well  HEENT:  Scleral icterus? {gen no default/yes/free *  Abdomen:       Skin:    Acne?  no   Hirsutism? no   Skin tags? no   Acanthosis Nigricans?  no  Ext:  Edema? no      Assessment & Plan  Encounter Diagnoses   Name Primary?  Morbidly obese ([facility] [address] [address] Yes    Screening for diabetes mellitus     Screening for hyperlipidemia        1. labs reviewed w/ patient  Checking labs today    3. Medication changes include: none    Based on his history, labs and EKG, Humera Torres. is  a good candidate for the [facility] Program    The primary encounter diagnosis was Morbidly obese ([facility] [address] Diagnoses of Screening for diabetes mellitus and Screening for hyperlipidemia were also pertinent to this visit.

## 2020-09-29 LAB
ALBUMIN SERPL-MCNC: 4.1 G/DL (ref 3.5–5)
ALBUMIN/GLOB SERPL: 1.1 {RATIO} (ref 1.1–2.2)
ALP SERPL-CCNC: 99 U/L (ref 45–117)
ALT SERPL-CCNC: 27 U/L (ref 12–78)
ANION GAP SERPL CALC-SCNC: 8 MMOL/L (ref 5–15)
AST SERPL-CCNC: 17 U/L (ref 15–37)
BILIRUB SERPL-MCNC: 0.4 MG/DL (ref 0.2–1)
BUN SERPL-MCNC: 10 MG/DL (ref 6–20)
BUN/CREAT SERPL: 12 (ref 12–20)
CALCIUM SERPL-MCNC: 9.1 MG/DL (ref 8.5–10.1)
CHLORIDE SERPL-SCNC: 108 MMOL/L (ref 97–108)
CHOLEST SERPL-MCNC: 188 MG/DL
CO2 SERPL-SCNC: 26 MMOL/L (ref 21–32)
CREAT SERPL-MCNC: 0.86 MG/DL (ref 0.7–1.3)
EST. AVERAGE GLUCOSE BLD GHB EST-MCNC: 111 MG/DL
GLOBULIN SER CALC-MCNC: 3.9 G/DL (ref 2–4)
GLUCOSE SERPL-MCNC: 88 MG/DL (ref 65–100)
HBA1C MFR BLD: 5.5 % (ref 4–5.6)
HDLC SERPL-MCNC: 58 MG/DL
HDLC SERPL: 3.2 {RATIO} (ref 0–5)
LDLC SERPL CALC-MCNC: 111.4 MG/DL (ref 0–100)
LIPID PROFILE,FLP: ABNORMAL
POTASSIUM SERPL-SCNC: 3.7 MMOL/L (ref 3.5–5.1)
PROT SERPL-MCNC: 8 G/DL (ref 6.4–8.2)
SODIUM SERPL-SCNC: 142 MMOL/L (ref 136–145)
TRIGL SERPL-MCNC: 93 MG/DL (ref ?–150)
VLDLC SERPL CALC-MCNC: 18.6 MG/DL

## 2020-10-11 NOTE — PROGRESS NOTES
The blood sugar is normal  The liver and kidney are normal  The LDL cholesterol is above the goal. The goal is less than 100 and your level is 111. Avoid fried food, fast food and junk food. Also move more each day. Try not to sit for more than an hour at a time.  I want to recheck the cholesterol levels in three months

## 2020-10-23 ENCOUNTER — VIRTUAL VISIT (OUTPATIENT)
Dept: FAMILY MEDICINE CLINIC | Age: 35
End: 2020-10-23
Payer: COMMERCIAL

## 2020-10-23 DIAGNOSIS — E78.00 PURE HYPERCHOLESTEROLEMIA: Primary | ICD-10-CM

## 2020-10-23 DIAGNOSIS — E66.01 MORBIDLY OBESE (HCC): ICD-10-CM

## 2020-10-23 PROCEDURE — 99213 OFFICE O/P EST LOW 20 MIN: CPT | Performed by: FAMILY MEDICINE

## 2020-10-23 NOTE — PROGRESS NOTES
1. Have you been to the ER, urgent care clinic since your last visit? Hospitalized since your last visit? No    2. Have you seen or consulted any other health care providers outside of the [address] [place] [address] since your last visit? Include any pap smears or colon screening.  No     Chief Complaint   Patient presents with    Weight Management     Patient-Reported Vitals 10/23/2020   Patient-Reported Weight 312lb      3 most recent PHQ Screens 10/23/2020   Little interest or pleasure in doing things Not at all   Feeling down, depressed, irritable, or hopeless Not at all   Total Score PHQ 2 0

## 2020-10-23 NOTE — PROGRESS NOTES
[facility] Progress Note:   F/up Physician Visit    Braulio Peña is a 38 y.o. male who was seen by synchronous (real-time) audio-video technology on 10/23/2020. Consent:  He and/or his healthcare decision maker is aware that this patient-initiated Telehealth encounter is a billable service, with coverage as determined by his insurance carrier. He is aware that he may receive a bill and has provided verbal consent to proceed: Yes    I was at home while conducting this encounter. Has been on products 2 weeks so far started 320 lbs  Now 312 lbs  Lunch to dinner feels the need to snack  He wants to know what is a good choice  7l2d  Subjective:   Braulio Peña was seen for Weight Management    f/up physician visit for the VLCD / LCD Program.  Prior to Admission medications    Medication Sig Start Date End Date Taking? Authorizing Provider   multivitamin (ONE A DAY) tablet Take 1 Tab by mouth daily. Yes Provider, Historical     Allergies   Allergen Reactions    Doxycycline Other (comments)     Burning throat and ears       Patient Active Problem List    Diagnosis Date Noted    Chest pain 10/27/2017    Morbidly obese ([address] 10/27/2017     Current Outpatient Medications   Medication Sig Dispense Refill    multivitamin (ONE A DAY) tablet Take 1 Tab by mouth daily. ROS      CC: Weight Management      Braulio Peña is a 38 y.o. male who is here for his      Weight Metrics 4/21/2020 12/9/2019 10/1/2019 10/27/2017 11/20/2013   Weight 310 lb 321 lb 6.4 oz 319 lb 303 lb 295 lb   BMI 48.55 kg/m2 50.34 kg/m2 49.96 kg/m2 47.46 kg/m2 46.19 kg/m2         Outpatient Medications Marked as Taking for the 10/23/20 encounter (Virtual Visit) with Phi Garcia MD   Medication Sig Dispense Refill    multivitamin (ONE A DAY) tablet Take 1 Tab by mouth daily.            Participation   Did you attend clinic and class last week? no    Review of Systems  Since your last visit, have you experienced any complications? no  If yes, please list:       Are you taking an appetite suppressant? no  If so, is there any Chest Pain, Palpitations or Dizziness? BP Readings from Last 3 Encounters:   12/09/19 119/75   10/01/19 118/78   10/27/17 108/78       SLEEP: using the cpap every time he sleeps    Have you received any other medical care this week? no  If yes, where and for what? Have you discontinued or changed any medicine or dose of your medicine since your last visit with Dr Haim Pinedo? no  If yes, where and for what? Diet  How many ounces of calorie-free liquids did you consume each day? Not measuring oz    How many meal replacements did you take each day? *2 and a meal  Has been missing the sensation of chewing      Did you have any problems adhering to the program?  no If yes, please explain:      Exercise  Aerobic exercise: cutting the grass and moving more   min  Resistance exercise: 0 workouts / week  Any discomfort?  no     If yes, where? Objective  There were no vitals taken for this visit. No LMP for male patient. PHYSICAL EXAMINATION:  [ INSTRUCTIONS:  \"[x]\" Indicates a positive item  \"[]\" Indicates a negative item  -- DELETE ALL ITEMS NOT EXAMINED]  Vital Signs: (As obtained by patient/caregiver at home)  There were no vitals taken for this visit.      Constitutional: [x] Appears well-developed and well-nourished [x] No apparent distress      [] Abnormal -     Mental status: [x] Alert and awake  [x] Oriented to person/place/time [x] Able to follow commands    [] Abnormal -     Eyes:   EOM    [x]  Normal    [] Abnormal -   Sclera  [x]  Normal    [] Abnormal -          Discharge [x]  None visible   [] Abnormal -     HENT: [x] Normocephalic, atraumatic  [] Abnormal -   [x] Mouth/Throat: Mucous membranes are moist    External Ears [x] Normal  [] Abnormal -    Neck: [x] No visualized mass [] Abnormal -     Pulmonary/Chest: [x] Respiratory effort normal   [x] No visualized signs of difficulty breathing or respiratory distress        [] Abnormal -      Musculoskeletal:   [x] Normal gait with no signs of ataxia         [x] Normal range of motion of neck        [] Abnormal -     Neurological:        [x] No Facial Asymmetry (Cranial nerve 7 motor function) (limited exam due to video visit)          [x] No gaze palsy        [] Abnormal -          Skin:        [x] No significant exanthematous lesions or discoloration noted on facial skin         [] Abnormal -            Psychiatric:       [x] Normal Affect [] Abnormal -        [x] No Hallucinations    Other pertinent observable physical exam findings:-      Assessment / Plan    Encounter Diagnoses   Name Primary?  Pure hypercholesterolemia Yes    Morbidly obese ([facility] [address]      Diagnoses and all orders for this visit:    1. Pure hypercholesterolemia  The diet and exercise will lower the cholesterol in most cases  2. Morbidly obese (HCC)    has lost 8 lbs in 2 weeks   doing great  We discussed protein snacks like an egg or deli meat or eating more veggies   I also explainned the hunger is prob coming from the eating of sweets and starches that lead to an insulin surge  Recheck 1 month  1. Weight management improved   Progress was reviewed with patient    2. Labs    Latest results reviewed with patient       The primary encounter diagnosis was Pure hypercholesterolemia. A diagnosis of Morbidly obese (HCC) was also pertinent to this visit. Coding Help - Use CPT Codes 73596-30451, 74417-70637 for Established and New Patients respectively, either employing EM elements or Time rules. Other codes (example consult codes) may also apply. We discussed the expected course, resolution and complications of the diagnosis(es) in detail. Medication risks, benefits, costs, interactions, and alternatives were discussed as indicated.   I advised him to contact the office if his condition worsens, changes or fails to improve as anticipated. He expressed understanding with the diagnosis(es) and plan. Pursuant to the emergency declaration under the [facility], [facility] waiver authority and the [employer] Act, this Virtual  Visit was conducted, with patient's consent, to reduce the patient's risk of exposure to COVID-19 and provide continuity of care for an established patient. Services were provided through a video synchronous discussion virtually to substitute for in-person clinic visit.     Mary Ravi MD

## 2020-11-25 ENCOUNTER — OFFICE VISIT (OUTPATIENT)
Dept: FAMILY MEDICINE CLINIC | Age: 35
End: 2020-11-25
Payer: COMMERCIAL

## 2020-11-25 VITALS
BODY MASS INDEX: 47.16 KG/M2 | WEIGHT: 300.5 LBS | HEART RATE: 98 BPM | OXYGEN SATURATION: 98 % | TEMPERATURE: 98.6 F | DIASTOLIC BLOOD PRESSURE: 88 MMHG | HEIGHT: 67 IN | SYSTOLIC BLOOD PRESSURE: 139 MMHG | RESPIRATION RATE: 16 BRPM

## 2020-11-25 DIAGNOSIS — E66.01 MORBIDLY OBESE (HCC): ICD-10-CM

## 2020-11-25 DIAGNOSIS — E78.00 PURE HYPERCHOLESTEROLEMIA: Primary | ICD-10-CM

## 2020-11-25 PROCEDURE — 99214 OFFICE O/P EST MOD 30 MIN: CPT | Performed by: FAMILY MEDICINE

## 2020-11-25 RX ORDER — TOPIRAMATE 25 MG/1
25 TABLET ORAL
Qty: 30 TAB | Refills: 5 | Status: SHIPPED | OUTPATIENT
Start: 2020-11-25 | End: 2021-06-02

## 2020-11-25 NOTE — PROGRESS NOTES
1. Have you been to the ER, urgent care clinic since your last visit? Hospitalized since your last visit? No    2. Have you seen or consulted any other health care providers outside of the [address] [place] [address] since your last visit? Include any pap smears or colon screening. No     Pharmacy verified. [facility] #[id number]- [facility] [address]    Chief Complaint   Patient presents with    Weight Management     Visit Vitals  /88 (BP 1 Location: Left arm, BP Patient Position: Sitting)   Pulse 98   Temp 98.6 °F (37 °C) (Oral)   Resp 16   Ht 5' 7\" (1.702 m)   Wt 300 lb 8 oz (136.3 kg)   SpO2 98%   BMI 47.06 kg/m²     3 most recent PHQ Screens 11/25/2020   Little interest or pleasure in doing things Not at all   Feeling down, depressed, irritable, or hopeless Not at all   Total Score PHQ 2 0     Body Weight: 300.5 LB  Body Fat%: 41.3 %  BMI: 46.8  Body Water Weight: 39.6 %  Resting Energy: 2243

## 2020-12-22 ENCOUNTER — VIRTUAL VISIT (OUTPATIENT)
Dept: FAMILY MEDICINE CLINIC | Age: 35
End: 2020-12-22
Payer: COMMERCIAL

## 2020-12-22 DIAGNOSIS — E78.00 PURE HYPERCHOLESTEROLEMIA: Primary | ICD-10-CM

## 2020-12-22 DIAGNOSIS — E66.01 MORBIDLY OBESE (HCC): ICD-10-CM

## 2020-12-22 PROCEDURE — 99213 OFFICE O/P EST LOW 20 MIN: CPT | Performed by: FAMILY MEDICINE

## 2020-12-22 NOTE — PROGRESS NOTES
[facility] Progress Note:   F/up Physician Visit    Ida Castillo is a 38 y.o. male who was seen by synchronous (real-time) audio-video technology on 12/22/2020. Consent:  He and/or his healthcare decision maker is aware that this patient-initiated Telehealth encounter is a billable service, with coverage as determined by his insurance carrier. He is aware that he may receive a bill and has provided verbal consent to proceed: Yes    I was at home while conducting this encounter. 712  Subjective:   Ida Pate was seen for Weight Management    f/up physician visit for the VLCD / LCD Program.  Prior to Admission medications    Medication Sig Start Date End Date Taking? Authorizing Provider   multivitamin (ONE A DAY) tablet Take 1 Tab by mouth daily. Yes Provider, Historical   topiramate (Topamax) 25 mg tablet Take 1 Tab by mouth daily (with dinner). 11/25/20   Judge Len MD     Allergies   Allergen Reactions    Doxycycline Other (comments)     Burning throat and ears       Patient Active Problem List    Diagnosis Date Noted    Chest pain 10/27/2017    Morbidly obese ([address] 10/27/2017     Current Outpatient Medications   Medication Sig Dispense Refill    multivitamin (ONE A DAY) tablet Take 1 Tab by mouth daily.  topiramate (Topamax) 25 mg tablet Take 1 Tab by mouth daily (with dinner).  30 Tab 5       ROS      CC: Weight Management    Now 288  Was 300 in nov    Has not been able to exercise regularly  He started tabata last week 20 mins 4 days a weeka nd that is when the weight loss started      Ida Castillo is a 38 y.o. male who is here for his      Weight Metrics 11/25/2020 4/21/2020 12/9/2019 10/1/2019 10/27/2017 11/20/2013   Weight 300 lb 8 oz 310 lb 321 lb 6.4 oz 319 lb 303 lb 295 lb   BMI 47.06 kg/m2 48.55 kg/m2 50.34 kg/m2 49.96 kg/m2 47.46 kg/m2 46.19 kg/m2         Outpatient Medications Marked as Taking for the 12/22/20 encounter (Virtual Visit) with Juan Atkinson MD   Medication Sig Dispense Refill    multivitamin (ONE A DAY) tablet Take 1 Tab by mouth daily. Participation   Did you attend clinic and class last week? Having trouble w the timing    Review of Systems  Since your last visit, have you experienced any complications? no  If yes, please list:       Are you taking an appetite suppressant? No, did not need it  If so, is there any Chest Pain, Palpitations or Dizziness? BP Readings from Last 3 Encounters:   11/25/20 139/88   12/09/19 119/75   10/01/19 118/78       SLEEP:  avg 7 hrs    Have you received any other medical care this week? no  If yes, where and for what? Have you discontinued or changed any medicine or dose of your medicine since your last visit with Dr Mike Taylor? no  If yes, where and for what? Diet  How many ounces of calorie-free liquids did you consume each day?  32 oz    How many meal replacements did you take each day? 2 and meal   No sweetts    Did you have any problems adhering to the program?  no If yes, please explain:      Exercise  Aerobic exercise: 20 min  Resistance exercise: 20 workouts / week  Any discomfort?  no     If yes, where? Objective  There were no vitals taken for this visit. No LMP for male patient. PHYSICAL EXAMINATION:  [ INSTRUCTIONS:  \"[x]\" Indicates a positive item  \"[]\" Indicates a negative item  -- DELETE ALL ITEMS NOT EXAMINED]  Vital Signs: (As obtained by patient/caregiver at home)  There were no vitals taken for this visit.      Constitutional: [x] Appears well-developed and well-nourished [x] No apparent distress      [] Abnormal -     Mental status: [x] Alert and awake  [x] Oriented to person/place/time [x] Able to follow commands    [] Abnormal -     Eyes:   EOM    [x]  Normal    [] Abnormal -   Sclera  [x]  Normal    [] Abnormal -          Discharge [x]  None visible   [] Abnormal -     HENT: [x] Normocephalic, atraumatic  [] Abnormal -   [x] Mouth/Throat: Mucous membranes are moist    External Ears [x] Normal  [] Abnormal -    Neck: [x] No visualized mass [] Abnormal -     Pulmonary/Chest: [x] Respiratory effort normal   [x] No visualized signs of difficulty breathing or respiratory distress        [] Abnormal -      Musculoskeletal:   [x] Normal gait with no signs of ataxia         [x] Normal range of motion of neck        [] Abnormal -     Neurological:        [x] No Facial Asymmetry (Cranial nerve 7 motor function) (limited exam due to video visit)          [x] No gaze palsy        [] Abnormal -          Skin:        [x] No significant exanthematous lesions or discoloration noted on facial skin         [] Abnormal -            Psychiatric:       [x] Normal Affect [] Abnormal -        [x] No Hallucinations    Other pertinent observable physical exam findings:-      Assessment / Plan    Encounter Diagnoses   Name Primary?  Pure hypercholesterolemia Yes    Morbidly obese ([facility] [address]      Diagnoses and all orders for this visit:    1. Pure hypercholesterolemia  Monitor and treat as indicated  2. Morbidly obese ([address]    doing well with weight lossno changes are needed  1. Weight management improved   Progress was reviewed with patient    2. Labs    Latest results reviewed with patient          The primary encounter diagnosis was Pure hypercholesterolemia. A diagnosis of Morbidly obese (HCC) was also pertinent to this visit. Follow-up and Dispositions    · Return in about 1 month (around 1/22/2021). Coding Help - Use CPT Codes 39912-76417, [id number] for Established and New Patients respectively, either employing EM elements or Time rules. Other codes (example consult codes) may also apply. We discussed the expected course, resolution and complications of the diagnosis(es) in detail. Medication risks, benefits, costs, interactions, and alternatives were discussed as indicated.   I advised him to contact the office if his condition worsens, changes or fails to improve as anticipated. He expressed understanding with the diagnosis(es) and plan. Pursuant to the emergency declaration under the [facility], Counts include 234 beds at the [facility] waiver authority and the [employer] Act, this Virtual  Visit was conducted, with patient's consent, to reduce the patient's risk of exposure to COVID-19 and provide continuity of care for an established patient. Services were provided through a video synchronous discussion virtually to substitute for in-person clinic visit.     Marie Patton MD

## 2020-12-22 NOTE — PROGRESS NOTES
1. Have you been to the ER, urgent care clinic since your last visit? Hospitalized since your last visit? No    2. Have you seen or consulted any other health care providers outside of the [address] [place] [address] since your last visit? Include any pap smears or colon screening. No     Pharmacy verified. [id number]- [facility] [address]    Chief Complaint   Patient presents with   [facility] Weight Management     Patient-Reported Vitals 12/22/2020   Patient-Reported Weight 288lb        Health Maintenance Due   Topic Date Due    DTaP/Tdap/Td series (1 - Tdap) 01/21/2005    Flu Vaccine (1) 09/01/2020     3 most recent PHQ Screens 12/22/2020   Little interest or pleasure in doing things Not at all   Feeling down, depressed, irritable, or hopeless Not at all   Total Score PHQ 2 0

## 2021-01-28 ENCOUNTER — VIRTUAL VISIT (OUTPATIENT)
Dept: FAMILY MEDICINE CLINIC | Age: 36
End: 2021-01-28
Payer: COMMERCIAL

## 2021-01-28 DIAGNOSIS — E78.00 PURE HYPERCHOLESTEROLEMIA: Primary | ICD-10-CM

## 2021-01-28 DIAGNOSIS — E66.01 MORBIDLY OBESE (HCC): ICD-10-CM

## 2021-01-28 PROCEDURE — 99213 OFFICE O/P EST LOW 20 MIN: CPT | Performed by: FAMILY MEDICINE

## 2021-01-28 NOTE — PROGRESS NOTES
[facility] Progress Note:   F/up Physician Visit    Alexa Majano is a 39 y.o. male who was seen by synchronous (real-time) audio-video technology on 1/28/2021. Consent:  He and/or his healthcare decision maker is aware that this patient-initiated Telehealth encounter is a billable service, with coverage as determined by his insurance carrier. He is aware that he may receive a bill and has provided verbal consent to proceed: Yes    I was at home while conducting this encounter. 712  Subjective:   Alexa Lindsya was seen for Weight Management    Now 288  Dec 288  Has been up and down this month  He says his waist measures went down  He says other than his Bday and thanksgiving he has stuck strickly to the program      f/up physician visit for the VLCD / LCD Program.  Prior to Admission medications    Medication Sig Start Date End Date Taking? Authorizing Provider   topiramate (Topamax) 25 mg tablet Take 1 Tab by mouth daily (with dinner). 11/25/20  Yes Erik Payan MD   multivitamin (ONE A DAY) tablet Take 1 Tab by mouth daily. Yes Provider, Historical     Allergies   Allergen Reactions    Doxycycline Other (comments)     Burning throat and ears       Patient Active Problem List    Diagnosis Date Noted    Chest pain 10/27/2017    Morbidly obese ([address] 10/27/2017     Current Outpatient Medications   Medication Sig Dispense Refill    topiramate (Topamax) 25 mg tablet Take 1 Tab by mouth daily (with dinner). 30 Tab 5    multivitamin (ONE A DAY) tablet Take 1 Tab by mouth daily.          ROS      CC: Weight Management      Alexa Majano is a 39 y.o. male who is here for his      Weight Metrics 11/25/2020 4/21/2020 12/9/2019 10/1/2019 10/27/2017 11/20/2013   Weight 300 lb 8 oz 310 lb 321 lb 6.4 oz 319 lb 303 lb 295 lb   BMI 47.06 kg/m2 48.55 kg/m2 50.34 kg/m2 49.96 kg/m2 47.46 kg/m2 46.19 kg/m2         Outpatient Medications Marked as Taking for the 1/28/21 encounter (Virtual Visit) with Fransisco Levi MD   Medication Sig Dispense Refill    topiramate (Topamax) 25 mg tablet Take 1 Tab by mouth daily (with dinner). 30 Tab 5    multivitamin (ONE A DAY) tablet Take 1 Tab by mouth daily. Participation   Did you attend clinic and class last week? yes    Review of Systems  Since your last visit, have you experienced any complications? no  If yes, please list:       Are you taking an appetite suppressant? Has rx for topamax but not taking it  If so, is there any Chest Pain, Palpitations or Dizziness? BP Readings from Last 3 Encounters:   11/25/20 139/88   12/09/19 119/75   10/01/19 118/78       SLEEP: 5 lately    Have you received any other medical care this week? no  If yes, where and for what? Have you discontinued or changed any medicine or dose of your medicine since your last visit with Dr Marcelino Nunez? no  If yes, where and for what? Diet  How many ounces of calorie-free liquids did you consume each day? 64 oz    How many meal replacements did you take each day? 2 and one meal and a veggie snack in pm    Did you have any problems adhering to the program?  no If yes, please explain:      Exercise  Aerobic exercise: 5 days tabata 30 min  Resistance exercise: 5 workouts / week  Any discomfort?  no     If yes, where? Objective  There were no vitals taken for this visit. No LMP for male patient. PHYSICAL EXAMINATION:  [ INSTRUCTIONS:  \"[x]\" Indicates a positive item  \"[]\" Indicates a negative item  -- DELETE ALL ITEMS NOT EXAMINED]  Vital Signs: (As obtained by patient/caregiver at home)  There were no vitals taken for this visit.      Constitutional: [x] Appears well-developed and well-nourished [x] No apparent distress      [] Abnormal -     Mental status: [x] Alert and awake  [x] Oriented to person/place/time [x] Able to follow commands    [] Abnormal -     Eyes:   EOM    [x]  Normal    [] Abnormal -   Sclera  [x]  Normal [] Abnormal -          Discharge [x]  None visible   [] Abnormal -     HENT: [x] Normocephalic, atraumatic  [] Abnormal -   [x] Mouth/Throat: Mucous membranes are moist    External Ears [x] Normal  [] Abnormal -    Neck: [x] No visualized mass [] Abnormal -     Pulmonary/Chest: [x] Respiratory effort normal   [x] No visualized signs of difficulty breathing or respiratory distress        [] Abnormal -      Musculoskeletal:   [x] Normal gait with no signs of ataxia         [x] Normal range of motion of neck        [] Abnormal -     Neurological:        [x] No Facial Asymmetry (Cranial nerve 7 motor function) (limited exam due to video visit)          [x] No gaze palsy        [] Abnormal -          Skin:        [x] No significant exanthematous lesions or discoloration noted on facial skin         [] Abnormal -            Psychiatric:       [x] Normal Affect [] Abnormal -        [x] No Hallucinations    Other pertinent observable physical exam findings:-      Assessment / Plan    Encounter Diagnoses   Name Primary?  Pure hypercholesterolemia Yes    Morbidly obese ([address]      Diagnoses and all orders for this visit:    1. Pure hypercholesterolemia  receck next month  2. Morbidly obese ([address]    Weight management stable   Progress was reviewed with patient  I think he is eating a lot of sodium and weight fluctuating w water retention  Avoid processed meats  Write down all foods eaten  Recheck 1 month in offce visit  2. Labs    Latest results reviewed with patient   Repeat next month       The primary encounter diagnosis was Pure hypercholesterolemia. A diagnosis of Morbidly obese (HCC) was also pertinent to this visit. Coding Help - Use CPT Codes 49443-05390, 78147-94054 for Established and New Patients respectively, either employing EM elements or Time rules. Other codes (example consult codes) may also apply.       We discussed the expected course, resolution and complications of the diagnosis(es) in detail. Medication risks, benefits, costs, interactions, and alternatives were discussed as indicated. I advised him to contact the office if his condition worsens, changes or fails to improve as anticipated. He expressed understanding with the diagnosis(es) and plan. Pursuant to the emergency declaration under the [facility], [facility] waiver authority and the [facility] and [employer] Act, this Virtual  Visit was conducted, with patient's consent, to reduce the patient's risk of exposure to COVID-19 and provide continuity of care for an established patient. Services were provided through a video synchronous discussion virtually to substitute for in-person clinic visit.     Rock Chase MD

## 2021-01-28 NOTE — PROGRESS NOTES
1. Have you been to the ER, urgent care clinic since your last visit? Hospitalized since your last visit? No    2. Have you seen or consulted any other health care providers outside of the [address] [place] [address] since your last visit? Include any pap smears or colon screening. No     Pharmacy verified. [facility][id number]- [facility] [address]    Chief Complaint   Patient presents with   [facility]     Patient-Reported Vitals 1/28/2021   Patient-Reported Weight 288lb      3 most recent PHQ Screens 1/28/2021   Little interest or pleasure in doing things Not at all   Feeling down, depressed, irritable, or hopeless Not at all   Total Score PHQ 2 0

## 2021-03-04 ENCOUNTER — OFFICE VISIT (OUTPATIENT)
Dept: FAMILY MEDICINE CLINIC | Age: 36
End: 2021-03-04
Payer: COMMERCIAL

## 2021-03-04 VITALS
SYSTOLIC BLOOD PRESSURE: 109 MMHG | RESPIRATION RATE: 16 BRPM | TEMPERATURE: 98.4 F | OXYGEN SATURATION: 100 % | WEIGHT: 276.6 LBS | HEART RATE: 95 BPM | HEIGHT: 67 IN | DIASTOLIC BLOOD PRESSURE: 68 MMHG | BODY MASS INDEX: 43.41 KG/M2

## 2021-03-04 DIAGNOSIS — E78.00 PURE HYPERCHOLESTEROLEMIA: Primary | ICD-10-CM

## 2021-03-04 DIAGNOSIS — E66.01 MORBIDLY OBESE (HCC): ICD-10-CM

## 2021-03-04 PROCEDURE — 99214 OFFICE O/P EST MOD 30 MIN: CPT | Performed by: FAMILY MEDICINE

## 2021-03-04 NOTE — PROGRESS NOTES
[facility] Progress Note:   F/up Physician Visit    CC: Weight Management    Jan 288  Now 89  He increased the intensity of work out    Pedro John Jr. is a 36 y.o. male who is here for his  f/up physician visit for the VLCD / LCD Program.    Weight Metrics 3/4/2021 11/25/2020 4/21/2020 12/9/2019 10/1/2019 10/27/2017 11/20/2013   Weight 276 lb 9.6 oz 300 lb 8 oz 310 lb 321 lb 6.4 oz 319 lb 303 lb 295 lb   BMI 43.32 kg/m2 47.06 kg/m2 48.55 kg/m2 50.34 kg/m2 49.96 kg/m2 47.46 kg/m2 46.19 kg/m2         Outpatient Medications Marked as Taking for the 3/4/21 encounter (Office Visit) with Chantell Santos MD   Medication Sig Dispense Refill   • multivitamin (ONE A DAY) tablet Take 1 Tab by mouth daily.           Participation   Did you attend clinic and class last week? yes    Review of Systems  Since your last visit, have you experienced any complications? no  If yes, please list:       Are you taking an appetite suppressant? no  If so, is there any Chest Pain, Palpitations or Dizziness?    BP Readings from Last 3 Encounters:   03/04/21 109/68   11/25/20 139/88   12/09/19 119/75       SLEEP:    Have you received any other medical care this week? no  If yes, where and for what?       Have you discontinued or changed any medicine or dose of your medicine since your last visit with Dr Santos? no  If yes, where and for what?         Diet  How many ounces of calorie-free liquids did you consume each day?  64 oz    How many meal replacements did you take each day? 2 and a meal    Did you have any problems adhering to the program?  no If yes, please explain:      Exercise  Aerobic exercise: 30 min, high intensity 3-4 times a week  Resistance exercise: 3-4 workouts / week  Any discomfort?  no     If yes, where?      Objective  Visit Vitals  /68 (BP 1 Location: Left upper arm, BP Patient Position: Sitting, BP Cuff Size: Adult)   Pulse 95   Temp 98.4 °F (36.9 °C) (Oral)   Resp 16   Ht 5' 7\" (1.702  m)   Wt 276 lb 9.6 oz (125.5 kg)   SpO2 100%   BMI 43.32 kg/m²     No LMP for male patient. Physical Exam  Appearance: well,   Mental:A&O x 3, NAD  H:NC/AT,  EENT:   EOMI, PERRL, No scleral icterus  Neck: no bruit or JVD  Lung: clear, No W/R  ABD: soft, active, nontender  Ext:  no Edema  Neuro: nonfocal  Assessment / Plan    Encounter Diagnoses   Name Primary?  Pure hypercholesterolemia Yes    Morbidly obese [address]      Diagnoses and all orders for this visit:    1. Pure hypercholesterolemia  -     LIPID PANEL; Future  Treating with lifestyle changes including this weight management process. This is medically necessary  2. Morbidly obese (HCC)  -     METABOLIC PANEL, COMPREHENSIVE; Future      1. Weight management improved   Progress was reviewed with patient  He feels hungry right before dinner  Feels comfortable with the program  Recheck 1 month  2.  Labs    Latest results reviewed with patient          The primary encounter diagnosis was Pure hypercholesterolemia. A diagnosis of Morbidly obese (HCC) was also pertinent to this visit.

## 2021-03-04 NOTE — PROGRESS NOTES
Room  15    Identified pt with two pt identifiers(name and ). Reviewed record in preparation for visit and have obtained necessary documentation. All patient medications has been reviewed. Chief Complaint   Patient presents with    Weight Management       3 most recent PHQ Screens 3/4/2021   Little interest or pleasure in doing things Not at all   Feeling down, depressed, irritable, or hopeless Not at all   Total Score PHQ 2 0     Abuse Screening Questionnaire 3/4/2021   Do you ever feel afraid of your partner? N   Are you in a relationship with someone who physically or mentally threatens you? N   Is it safe for you to go home? Y       Health Maintenance Due   Topic    COVID-19 Vaccine (1 of 2)    DTaP/Tdap/Td series (1 - Tdap)    Flu Vaccine (1)     Health Maintenance Review: Patient reminded of \"due or due soon\" health maintenance. I have asked the patient to contact his/her primary care provider (PCP) for follow-up on his/her health maintenance. Vitals:    21 1449   Pulse: 95   Resp: 16   SpO2: 100%   Weight: 276 lb 9.6 oz (125.5 kg)   Height: 5' 7\" (1.702 m)   PainSc:   0 - No pain       Wt Readings from Last 3 Encounters:   21 276 lb 9.6 oz (125.5 kg)   20 300 lb 8 oz (136.3 kg)   20 310 lb (140.6 kg)     Temp Readings from Last 3 Encounters:   20 98.6 °F (37 °C) (Oral)   19 97.5 °F (36.4 °C)   13 97.6 °F (36.4 °C)     BP Readings from Last 3 Encounters:   20 139/88   19 119/75   10/01/19 118/78     Pulse Readings from Last 3 Encounters:   21 95   20 98   19 94       Coordination of Care Questionnaire:   1) Have you been to an emergency room, urgent care, or hospitalized since your last visit? No      2.  Have seen or consulted any other health care provider since your  No      Body Weight: 276.6  Body Fat 38.3  BMI: 42.0  Body Water 42.6  [employer] 2139

## 2021-03-05 LAB
ALBUMIN SERPL-MCNC: 4.1 G/DL (ref 3.5–5)
ALBUMIN/GLOB SERPL: 1.1 {RATIO} (ref 1.1–2.2)
ALP SERPL-CCNC: 106 U/L (ref 45–117)
ALT SERPL-CCNC: 25 U/L (ref 12–78)
ANION GAP SERPL CALC-SCNC: 5 MMOL/L (ref 5–15)
AST SERPL-CCNC: 16 U/L (ref 15–37)
BILIRUB SERPL-MCNC: 0.4 MG/DL (ref 0.2–1)
BUN SERPL-MCNC: 18 MG/DL (ref 6–20)
BUN/CREAT SERPL: 23 (ref 12–20)
CALCIUM SERPL-MCNC: 9.1 MG/DL (ref 8.5–10.1)
CHLORIDE SERPL-SCNC: 107 MMOL/L (ref 97–108)
CHOLEST SERPL-MCNC: 153 MG/DL
CO2 SERPL-SCNC: 28 MMOL/L (ref 21–32)
CREAT SERPL-MCNC: 0.8 MG/DL (ref 0.7–1.3)
GLOBULIN SER CALC-MCNC: 3.9 G/DL (ref 2–4)
GLUCOSE SERPL-MCNC: 94 MG/DL (ref 65–100)
HDLC SERPL-MCNC: 50 MG/DL
HDLC SERPL: 3.1 {RATIO} (ref 0–5)
LDLC SERPL CALC-MCNC: 91.2 MG/DL (ref 0–100)
LIPID PROFILE,FLP: NORMAL
POTASSIUM SERPL-SCNC: 4.4 MMOL/L (ref 3.5–5.1)
PROT SERPL-MCNC: 8 G/DL (ref 6.4–8.2)
SODIUM SERPL-SCNC: 140 MMOL/L (ref 136–145)
TRIGL SERPL-MCNC: 59 MG/DL (ref ?–150)
VLDLC SERPL CALC-MCNC: 11.8 MG/DL

## 2021-04-22 ENCOUNTER — VIRTUAL VISIT (OUTPATIENT)
Dept: FAMILY MEDICINE CLINIC | Age: 36
End: 2021-04-22

## 2021-04-22 ENCOUNTER — VIRTUAL VISIT (OUTPATIENT)
Dept: SLEEP MEDICINE | Age: 36
End: 2021-04-22
Payer: COMMERCIAL

## 2021-04-22 ENCOUNTER — TELEPHONE (OUTPATIENT)
Dept: SLEEP MEDICINE | Age: 36
End: 2021-04-22

## 2021-04-22 DIAGNOSIS — E78.00 PURE HYPERCHOLESTEROLEMIA: Primary | ICD-10-CM

## 2021-04-22 DIAGNOSIS — E66.01 MORBIDLY OBESE (HCC): ICD-10-CM

## 2021-04-22 DIAGNOSIS — G47.33 OSA (OBSTRUCTIVE SLEEP APNEA): Primary | ICD-10-CM

## 2021-04-22 PROCEDURE — 99214 OFFICE O/P EST MOD 30 MIN: CPT | Performed by: FAMILY MEDICINE

## 2021-04-22 PROCEDURE — 99213 OFFICE O/P EST LOW 20 MIN: CPT | Performed by: INTERNAL MEDICINE

## 2021-04-22 NOTE — PROGRESS NOTES
[facility] Progress Note:   F/up Physician Visit    Lorie Kruse is a 39 y.o. male who was seen by synchronous (real-time) audio-video technology on 4/22/2021. Consent:  He and/or his healthcare decision maker is aware that this patient-initiated Telehealth encounter is a billable service, with coverage as determined by his insurance carrier. He is aware that he may receive a bill and has provided verbal consent to proceed: Yes    I was at home while conducting this encounter. 712  Subjective:   Lorie Kruse was seen for Weight Loss (x1 mos follow up)  march 89  Now 89  Feeling \" good\" , not hungry all the time    f/up physician visit for the VLCD / LCD Program.  Prior to Admission medications    Medication Sig Start Date End Date Taking? Authorizing Provider   multivitamin (ONE A DAY) tablet Take 1 Tab by mouth daily. Yes Provider, Historical   topiramate (Topamax) 25 mg tablet Take 1 Tab by mouth daily (with dinner). 11/25/20   Franklyn Garcia MD     Allergies   Allergen Reactions    Doxycycline Other (comments)     Burning throat and ears       Patient Active Problem List    Diagnosis Date Noted    Chest pain 10/27/2017    Morbidly obese ([address] 10/27/2017     Current Outpatient Medications   Medication Sig Dispense Refill    multivitamin (ONE A DAY) tablet Take 1 Tab by mouth daily.  topiramate (Topamax) 25 mg tablet Take 1 Tab by mouth daily (with dinner).  30 Tab 5       ROS      CC: Weight Management      Lorie Kruse is a 39 y.o. male who is here for his      Weight Metrics 3/4/2021 11/25/2020 4/21/2020 12/9/2019 10/1/2019 10/27/2017 11/20/2013   Weight 276 lb 9.6 oz 300 lb 8 oz 310 lb 321 lb 6.4 oz 319 lb 303 lb 295 lb   BMI 43.32 kg/m2 47.06 kg/m2 48.55 kg/m2 50.34 kg/m2 49.96 kg/m2 47.46 kg/m2 46.19 kg/m2         Outpatient Medications Marked as Taking for the 4/22/21 encounter (Virtual Visit) with Franklyn Garcia MD   Medication Sig Dispense Refill    multivitamin (ONE A DAY) tablet Take 1 Tab by mouth daily. Participation   Did you attend clinic and class last week? yes    Review of Systems  Since your last visit, have you experienced any complications? no  If yes, please list:       Are you taking an appetite suppressant? no  If so, is there any Chest Pain, Palpitations or Dizziness? BP Readings from Last 3 Encounters:   03/04/21 109/68   11/25/20 139/88   12/09/19 119/75       SLEEP: 6    Have you received any other medical care this week? no  If yes, where and for what? Have you discontinued or changed any medicine or dose of your medicine since your last visit with Dr Reji Arroyo? no  If yes, where and for what? Diet  How many ounces of calorie-free liquids did you consume each day? 64 oz    How many meal replacements did you take each day? 2 and a meal    Did you have any problems adhering to the program?  no If yes, please explain:      Exercise  Aerobic exercise: 4 days 20 min  Resistance exercise: resistance workouts 20 min 4 days a weekworkouts / week  Any discomfort?  no     If yes, where? Objective  There were no vitals taken for this visit. No LMP for male patient. PHYSICAL EXAMINATION:  [ INSTRUCTIONS:  \"[x]\" Indicates a positive item  \"[]\" Indicates a negative item  -- DELETE ALL ITEMS NOT EXAMINED]  Vital Signs: (As obtained by patient/caregiver at home)  There were no vitals taken for this visit.      Constitutional: [x] Appears well-developed and well-nourished [x] No apparent distress      [] Abnormal -     Mental status: [x] Alert and awake  [x] Oriented to person/place/time [x] Able to follow commands    [] Abnormal -     Eyes:   EOM    [x]  Normal    [] Abnormal -   Sclera  [x]  Normal    [] Abnormal -          Discharge [x]  None visible   [] Abnormal -     HENT: [x] Normocephalic, atraumatic  [] Abnormal -   [x] Mouth/Throat: Mucous membranes are moist    External Ears [x] Normal  [] Abnormal -    Neck: [x] No visualized mass [] Abnormal -     Pulmonary/Chest: [x] Respiratory effort normal   [x] No visualized signs of difficulty breathing or respiratory distress        [] Abnormal -      Musculoskeletal:   [x] Normal gait with no signs of ataxia         [x] Normal range of motion of neck        [] Abnormal -     Neurological:        [x] No Facial Asymmetry (Cranial nerve 7 motor function) (limited exam due to video visit)          [x] No gaze palsy        [] Abnormal -          Skin:        [x] No significant exanthematous lesions or discoloration noted on facial skin         [] Abnormal -            Psychiatric:       [x] Normal Affect [] Abnormal -        [x] No Hallucinations    Other pertinent observable physical exam findings:-      Assessment / Plan    Encounter Diagnoses   Name Primary?  Pure hypercholesterolemia Yes    Morbidly obese ([facility]      Diagnoses and all orders for this visit:    1. Pure hypercholesterolemia  The weight management is medically necessary in order to lower the cholesterol  2. Morbidly obese ([facility]      1. Weight management improved   Progress was reviewed with patient  Goal is 160 and not more than 180. 180 is BMI 29  Aiming for closer to ideal BW since he is young enough to maintain it  2. Labs    Latest results reviewed with patient          The primary encounter diagnosis was Pure hypercholesterolemia. A diagnosis of Morbidly obese (HCC) was also pertinent to this visit. Coding Help - Use CPT Codes 52085-32286, 26570-18907 for Established and New Patients respectively, either employing EM elements or Time rules. Other codes (example consult codes) may also apply. We discussed the expected course, resolution and complications of the diagnosis(es) in detail. Medication risks, benefits, costs, interactions, and alternatives were discussed as indicated.   I advised him to contact the office if his condition worsens, changes or fails to improve as anticipated. He expressed understanding with the diagnosis(es) and plan. Pursuant to the emergency declaration under the [facility], [facility] waiver authority and the [employer] and [employer] Act, this Virtual  Visit was conducted, with patient's consent, to reduce the patient's risk of exposure to COVID-19 and provide continuity of care for an established patient. Services were provided through a video synchronous discussion virtually to substitute for in-person clinic visit.     Lisa Gardiner MD

## 2021-04-22 NOTE — PATIENT INSTRUCTIONS
[address] [place]. [address], [address] Tel.  [phone] Fax. 100 [facility] [address] [place], [address] [place] Tel.  [phone] Fax. [phone] [address] Tel.  [phone] Fax. [phone] Learning About CPAP for Sleep Apnea What is CPAP? CPAP is a small machine that you use at home every night while you sleep. It increases air pressure in your throat to keep your airway open. When you have sleep apnea, this can help you sleep better so you feel much better. CPAP stands for \"continuous positive airway pressure. \" The CPAP machine will have one of the following: · A mask that covers your nose and mouth · Prongs that fit into your nose · A mask that covers your nose only, the most common type. This type is called NCPAP. The N stands for \"nasal.\" Why is it done? CPAP is usually the best treatment for obstructive sleep apnea. It is the first treatment choice and the most widely used. Your doctor may suggest CPAP if you have: · Moderate to severe sleep apnea. · Sleep apnea and coronary artery disease (CAD) or heart failure. How does it help? · CPAP can help you have more normal sleep, so you feel less sleepy and more alert during the daytime. · CPAP may help keep heart failure or other heart problems from getting worse. · NCPAP may help lower your blood pressure. · If you use CPAP, your bed partner may also sleep better because you are not snoring or restless. What are the side effects? Some people who use CPAP have: · A dry or stuffy nose and a sore throat. · Irritated skin on the face. · Sore eyes. · Bloating. If you have any of these problems, work with your doctor to fix them. Here are some things you can try: · Be sure the mask or nasal prongs fit well. · See if your doctor can adjust the pressure of your CPAP. · If your nose is dry, try a humidifier.  
· If your nose is runny or stuffy, try decongestant medicine or a steroid nasal spray. If these things do not help, you might try a different type of machine. Some machines have air pressure that adjusts on its own. Others have air pressures that are different when you breathe in than when you breathe out. This may reduce discomfort caused by too much pressure in your nose. Where can you learn more? Go to Menger[employer] Enter R780 in the search box to learn more about \"Learning About CPAP for Sleep Apnea. \"  
© 2537-9278 Healthwise, Incorporated. Care instructions adapted under license by [address] [place] [address] [employer] (which disclaims liability or warranty for this information). This care instruction is for use with your licensed healthcare professional. If you have questions about a medical condition or this instruction, always ask your healthcare professional. [place] [address] any warranty or liability for your use of this information. Content Version: 3.1.77816; Last Revised: January 11, 2010 PROPER SLEEP HYGIENE What to avoid · Do not have drinks with caffeine, such as coffee or black tea, for 8 hours before bed. · Do not smoke or use other types of tobacco near bedtime. Nicotine is a stimulant and can keep you awake. · Avoid drinking alcohol late in the evening, because it can cause you to wake in the middle of the night. · Do not eat a big meal close to bedtime. If you are hungry, eat a light snack. · Do not drink a lot of water close to bedtime, because the need to urinate may wake you up during the night. · Do not read or watch TV in bed. Use the bed only for sleeping and sexual activity. What to try · Go to bed at the same time every night, and wake up at the same time every morning. Do not take naps during the day. · Keep your bedroom quiet, dark, and cool. · Get regular exercise, but not within 3 to 4 hours of your bedtime. [employer]bie [employer] · Sleep on a comfortable pillow and mattress.  
· If watching the clock makes you anxious, turn it facing away from you so you cannot see the time. · If you worry when you lie down, start a worry book. Well before bedtime, write down your worries, and then set the book and your concerns aside. · Try meditation or other relaxation techniques before you go to bed. · If you cannot fall asleep, get up and go to another room until you feel sleepy. Do something relaxing. Repeat your bedtime routine before you go to bed again. · Make your house quiet and calm about an hour before bedtime. Turn down the lights, turn off the TV, log off the computer, and turn down the volume on music. This can help you relax after a busy day. Drowsy Driving: The [employer] cites drowsiness as a causing factor in more than 795,142 police reported crashes annually, resulting in 76,000 injuries and 1,500 deaths. Other surveys suggest 55% of people polled have driven while drowsy in the past year, 23% had fallen asleep but not crashed, 3% crashed, and 2% had and accident due to drowsy driving. Who is at risk? Young Drivers: One study of drowsy driving accidents states that 55% of the drivers were under 25 years. Of those, 75% were male. Shift Workers and Travelers: People who work overnight or travel across time zones frequently are at higher risk of experiencing Circadian Rhythm Disorders. They are trying to work and function when their body is programed to sleep. Sleep Deprived: Lack of sleep has a serious impact on your ability to pay attention or focus on a task. Consistently getting less than the average of 8 hours your body needs creates partial or cumulative sleep deprivation. Untreated Sleep Disorders: Sleep Apnea, Narcolepsy, R.L.S., and other sleep disorders (untreated) prevent a person from getting enough restful sleep. This leads to excessive daytime sleepiness and increases the risk for drowsy driving accidents by up to 7 times.  
Medications / Alcohol: Even over the counter medications can cause drowsiness. Medications that impair a drivers attention should have a warning label. Alcohol naturally makes you sleepy and on its own can cause accidents. Combined with excessive drowsiness its effects are amplified. Signs of Drowsy Driving: * You don't remember driving the last few miles * You may drift out of your afshan * You are unable to focus and your thoughts wander * You may yawn more often than normal 
 * You have difficulty keeping your eyes open / nodding off * Missing traffic signs, speeding, or tailgating Prevention-  
Good sleep hygiene, lifestyle and behavioral choices have the most impact on drowsy driving. There is no substitute for sleep and the average person requires 8 hours nightly. If you find yourself driving drowsy, stop and sleep. Consider the sleep hygiene tips provided during your visit as well. Medication Refill Policy: Refills for all medications require 1 week advance notice. Please have your pharmacy fax a refill request. We are unable to fax, or call in \"controled substance\" medications and you will need to pick these prescriptions up from our office. [employer] Activation Thank you for requesting access to [employer]. Please follow the instructions below to securely access and download your online medical record. [employer] allows you to send messages to your doctor, view your test results, renew your prescriptions, schedule appointments, and more. How Do I Sign Up? 1. In your internet browser, go to [email or web address]. 2. Click on the First Time User? Click Here link in the Sign In box. You will see the New Member Sign Up page. 3. Enter your [employer] Access Code exactly as it appears below. You will not need to use this code after youve completed the sign-up process. If you do not sign up before the expiration date, you must request a new code. [employer] Access Code:  Activation code not generated Current MyDoc Status: Active (This is the date your MyDoc access code will ) 4. Enter the last four digits of your Social Security Number (xxxx) and Date of Birth (mm/dd/yyyy) as indicated and click Submit. You will be taken to the next sign-up page. 5. Create a [employer] ID. This will be your MyDoc login ID and cannot be changed, so think of one that is secure and easy to remember. 6. Create a MyDoc password. You can change your password at any time. 7. Enter your Password Reset Question and Answer. This can be used at a later time if you forget your password. 8. Enter your e-mail address. You will receive e-mail notification when new information is available in [address] 9. Click Sign Up. You can now view and download portions of your medical record. 10. Click the Download Summary menu link to download a portable copy of your medical information. Additional Information If you have questions, please call [phone]. Remember, MyDoc is NOT to be used for urgent needs. For medical emergencies, dial 911.

## 2021-04-22 NOTE — PROGRESS NOTES
Identified pt with two pt identifiers(name and ). Reviewed record in preparation for visit and have obtained necessary documentation. Chief Complaint   Patient presents with    Weight Loss     x1 mos follow up        Health Maintenance Due   Topic    Hepatitis C Screening     COVID-19 Vaccine (1)    DTaP/Tdap/Td series (1 - Tdap)       Coordination of Care Questionnaire:  :   1) Have you been to an emergency room, urgent care, or hospitalized since your last visit? If yes, where when, and reason for visit? no      2. Have seen or consulted any other health care provider since your last visit? If yes, where when, and reason for visit?  no        Patient is accompanied by self I have received verbal consent from Neeta Caceres. to discuss any/all medical information while they are present in the room.

## 2021-04-22 NOTE — PROGRESS NOTES
[address] [place]. [place], [address]  Tel.  [phone]  Fax. 100 [facility] 60  [place], [address] [facility]  Tel.  [phone]  Fax. [phone] [address]   Tel.  [phone]  Fax. [address] [place] [address] Street. (: 1985) is a 39 y.o. male, established patient, seen for positive airway pressure follow-up and evaluation of the following chief complaint(s):   Sleep Problem (Consent to [facility] appt in [place] _send link to [phone] Follow up appt )       Sabrina Mcarthur. was last seen by me on 20, prior notes reviewed in detail. Polysomnogram (PSG) performed on 2019 showed an average AHI of 83 per hour with an SpO2 samia of 82% and SpO2 of < 88% being 7.9 minutes    ASSESSMENT/PLAN:    ICD-10-CM ICD-9-CM    1. GUDELIA (obstructive sleep apnea)  G47.33 327.23 AMB SUPPLY ORDER   2. BMI 40.0-44.9, adult ([facility]  Z68.41 V85.41        On ResMed:  AirSense 10 AutoSet    Settings:  CPAP: 8 cmH2O      EPR: 2    1. Sleep Apnea -   * He is adherent with PAP therapy and PAP continues to benefit patient and remains necessary for control of his sleep apnea. Continue on current pressures. * Repeat testing due to interim weight loss since diagnosis advised but declined by patient who would like to continue therapy at this time. * Supplies for his device were ordered as noted below:    Orders Placed This Encounter    AMB SUPPLY ORDER     Diagnosis: (G47.33) GUDELIA (obstructive sleep apnea)  (primary encounter diagnosis)     Replacement Supplies for Positive Airway Pressure Therapy Device:   Duration of need: 99 months.  Nasal Cushion (Replace) 2 per month.  Nasal Interface Mask 1 every 3 months.  Headgear 1 every 6 months.  Tubing 1 every 3 months.  Filter(s) Disposable 2 per month.  Filter(s) Non-Disposable 1 every 6 months.    .   [address] [facility] for Humidifier (Replace) 1 every 6 months. Rneee Deal MD, [facility]; NPI: [id number]    Electronically signed. Date:- 04/22/21       * Counseling was provided regarding the importance of regular PAP use with emphasis on ensuring sufficient total sleep time, proper sleep hygiene, and safe driving. * Re-enforced proper and regular cleaning for the device. * He was asked to contact our office for any problems regarding PAP therapy. 2. Recommended a dedicated weight loss program through appropriate diet and exercise regimen as significant weight reduction has been shown to reduce severity of obstructive sleep apnea. Follow-up and Dispositions    · Return for follow-up with sleep provider in 1 yr or as needed. SUBJECTIVE/OBJECTIVE:    He  is seen today for follow up on PAP device and reports no problems using the device. The following concerns identified:    Drowsiness no Problems exhaling no   Snoring no Forget to put on no   Mask Comfortable yes Can't fall asleep no   Dry Mouth no Mask falls off no   Air Leaking no Frequent awakenings no       He admits that his sleep has improved on PAP therapy using nasal mask and non-heated tubing. His weight has decreased from 319 lbs to 268 lbs. Review of device download indicated:    Usage Days >= 4 hours 100 %  Median Usage  8 hour 2 minutes    Median Device Pressure 8 cmH2O  Device Pressure 95% 8 cmH2O    Median Leak 0.4 L/min  95% Leak 16.1 L/min    Average AHI: 0.2 /hr which reflects significantly improved sleep breathing condition. Trinity Sleepiness Score: 4   Modified F.O.S.Q. Score Total / 2: 19.5       Sleep Review of Systems: notable for Negative difficulty falling asleep; Negative awakenings at night; Negative  early morning headaches; Negative  memory problems; Negative  concentration issues;  Negative  chest pain; Negative  shortness of breath;  Negative rashes or itching; Negative heartburn / belching / flatulence; Negative  significant mood issues; no afternoon naps per week. Vitals reported by patient     Patient-Reported Vitals 4/22/2021   Patient-Reported Weight 268lb      Calculated BMI 43/32 kg/m2    Physical Exam completed by visual and auditory observation of patient with verbal input from patient. General:   Alert, oriented, not in acute distress   Eyes:  Anicteric Sclerae; no obvious strabismus   Nose:  No obvious nasal septum deviation    Neck:   Midline trachea, no visible mass   Chest/Lungs:  Respiratory effort normal, no visualized signs of difficulty breathing or respiratory distress   CVS:  No JVD   Extremities:  No obvious rashes noted on face, neck, or hands   Neuro:  No facial asymmetry, no focal deficits; no obvious tremor    Psych:  Normal affect,  normal countenance     Sheldon Lundy. is being evaluated by a Virtual Visit (video visit) encounter to address concerns as mentioned above. A caregiver was present when appropriate. Due to this being a TeleHealth encounter (During HFYCD-00 public health emergency), evaluation of the following organ systems was limited: Vitals/Constitutional/EENT/Resp/CV/GI//MS/Neuro/Skin/Heme-Lymph-Imm. Pursuant to the emergency declaration under the [address] [address] and the [employer] Act, this Virtual Visit was conducted with patient's (and/or legal guardian's) consent, to reduce the patient's risk of exposure to COVID-19 and provide necessary medical care. Patient identification was verified at the start of the visit: YES using name and date of birth. Patient's phone number [phone] (home)  was confirmed for accuracy. He gives permission for messages regarding results and appointments to be left at that number. Services were provided through a video synchronous discussion virtually to substitute for in-person clinic visit.   I was in the office while conducting this encounter, patient located at their home or alternate location of their choice. An electronic signature was used to authenticate this note. Margarette Colindres MD, FAASM  Electronically signed.  04/22/21

## 2021-04-26 ENCOUNTER — DOCUMENTATION ONLY (OUTPATIENT)
Dept: SLEEP MEDICINE | Age: 36
End: 2021-04-26

## 2021-06-02 ENCOUNTER — OFFICE VISIT (OUTPATIENT)
Dept: SURGERY | Age: 36
End: 2021-06-02
Payer: COMMERCIAL

## 2021-06-02 VITALS
WEIGHT: 258.5 LBS | DIASTOLIC BLOOD PRESSURE: 74 MMHG | SYSTOLIC BLOOD PRESSURE: 115 MMHG | RESPIRATION RATE: 18 BRPM | HEIGHT: 67 IN | OXYGEN SATURATION: 98 % | BODY MASS INDEX: 40.57 KG/M2 | TEMPERATURE: 98.4 F | HEART RATE: 73 BPM

## 2021-06-02 DIAGNOSIS — E78.00 HYPERCHOLESTEROLEMIA: ICD-10-CM

## 2021-06-02 DIAGNOSIS — E66.01 OBESITY, CLASS III, BMI 40-49.9 (MORBID OBESITY) (HCC): Primary | ICD-10-CM

## 2021-06-02 DIAGNOSIS — G47.33 OSA (OBSTRUCTIVE SLEEP APNEA): ICD-10-CM

## 2021-06-02 PROCEDURE — 99204 OFFICE O/P NEW MOD 45 MIN: CPT | Performed by: FAMILY MEDICINE

## 2021-06-02 NOTE — PROGRESS NOTES
[facility] Progress Note:   New at [facility] Physician Visit  Cont LCD  CC: Weight Management      Hossein Nath. is a 39 y.o. male who is here for his  f/up physician visit for the  LCD Program.    Weight Metrics 6/2/2021 3/4/2021 11/25/2020 4/21/2020 12/9/2019 10/1/2019 10/27/2017   Weight 258 lb 8 oz 276 lb 9.6 oz 300 lb 8 oz 310 lb 321 lb 6.4 oz 319 lb 303 lb   BMI 40.49 kg/m2 43.32 kg/m2 47.06 kg/m2 48.55 kg/m2 50.34 kg/m2 49.96 kg/m2 47.46 kg/m2         Outpatient Medications Marked as Taking for the 6/2/21 encounter (Office Visit) with Helen Bran MD   Medication Sig Dispense Refill    multivitamin (ONE A DAY) tablet Take 1 Tab by mouth daily. Participation   Did you attend clinic and class last week? yes    Review of Systems  Since your last visit, have you experienced any complications? no  If yes, please list:     Are you taking an appetite suppressant? no  If so, is there any Chest Pain, Palpitations or Dizziness? BP Readings from Last 3 Encounters:   06/02/21 115/74   03/04/21 109/68   11/25/20 139/88       SLEEP: 6-7    Have you received any other medical care this week? no  If yes, where and for what? Have you discontinued or changed any medicine or dose of your medicine since your last visit with Dr Rene Jordan? no  If yes, where and for what? Diet  How many ounces of calorie-free liquids did you consume each day?  60 oz    How many meal replacements did you take each day? 2 and a meal    Did you have any problems adhering to the program?  no If yes, please explain:      Exercise  Aerobic exercise: 3-4 days a week 20-30   min  Resistance exercise: calisthetics w hand weights each day of the exercise routine workouts / week  Any discomfort?  no     If yes, where?       Objective  Visit Vitals  /74 (BP 1 Location: Right arm, BP Patient Position: Sitting, BP Cuff Size: Adult long)   Pulse 73   Temp 98.4 °F (36.9 °C) (Oral)   Resp 18   Ht 5' 7\" (1.702 m)   Wt 258 lb 8 oz (117.3 kg)   SpO2 98%   BMI 40.49 kg/m²     No LMP for male patient. Physical Exam  Appearance: well,   Mental:A&O x 3, NAD  H:NC/AT,  EENT:   EOMI, PERRL, No scleral icterus  Neck: no bruit or JVD  Lung: clear, No W/R  ABD: soft, active, nontender  Ext:  no Edema  Neuro: nonfocal  Assessment / Plan    Encounter Diagnoses   Name Primary?  Obesity, Class III, BMI 40-49.9 (morbid obesity) ([address] Yes    Hypercholesterolemia      Diagnoses and all orders for this visit:    1. Obesity, Class III, BMI 40-49.9 (morbid obesity) ([address]  Started at 320 in September  Now 258  Doing very well on LCD  2. Hypercholesterolemia    the LCD and exercise has helped  Lower the LDL form 111 to 91  3. GUDELIA    On cpap since 2019  1. Weight management improved   Progress was reviewed with patient    2. Labs    Latest results reviewed with patient         * The primary encounter diagnosis was Obesity, Class III, BMI 40-49.9 (morbid obesity) ([address] A diagnosis of Hypercholesterolemia was also pertinent to this visit.

## 2021-06-02 NOTE — PROGRESS NOTES
1. Have you been to the ER, urgent care clinic since your last visit? Hospitalized since your last visit? No     2. Have you seen or consulted any other health care providers outside of the [address] [place] [address] since your last visit? Include any pap smears or colon screening.  No       BMI  402.2  Waist 44.5in   Neck 14.5in   45.5% water

## 2021-06-10 ENCOUNTER — CLINICAL SUPPORT (OUTPATIENT)
Dept: BARIATRICS/WEIGHT MGMT | Age: 36
End: 2021-06-10

## 2021-06-10 DIAGNOSIS — E66.01 MORBIDLY OBESE (HCC): Primary | ICD-10-CM

## 2021-06-10 NOTE — PROGRESS NOTES
[facility]  Metabolic Weight Loss Program        Patient's Name: Eri Hutchins. : 1985    This patient is enrolled in [address] [place] [address] Weight Loss Program and attended the required [facility] Low Calorie Diet (LCD) nutrition visit hosted via [employer] today. Topics included:  -LCD meal patterns: 2 meal replacements every day plus a grocery meal and grocery snack OR 3 meal replacements every day plus a grocery meal.    -Daily totals: 1000kcals-1200kcals every day, 60-80 grams carbs every day. -Grocery meal: Use the balanced plate method to plan meals, include 3-6 oz of lean source of protein, unlimited non-starchy vegetables, 1/2 cup whole grains/beans OR 1/2 cup fruit OR 1 serving of low fat dairy. Utilize handouts listing healthy snack and meal ideas.    -Read all nutrition labels. Demonstrated and emphasized identifying serving size, total fat, sugar and protein content. Defined low fat as </= 3 g per serving. Discussed lean and extra lean sources of protein. Provided list of low fat cooking methods. Avoid foods with sugar listed in the first 3 ingredients and >/10 g sugar per serving.     -Practice mindful eating habits; take small bites, chew thoroughly, avoid distractions, utilize hunger/fullness scale.     -Attend Metabolic Weight Loss Class and Support Group and increase physical activity (approved per MD) for long term weight maintenance.      Marianne Serrano, MS, RD, LDN

## 2021-06-14 ENCOUNTER — CLINICAL SUPPORT (OUTPATIENT)
Dept: SURGERY | Age: 36
End: 2021-06-14

## 2021-06-14 VITALS
OXYGEN SATURATION: 100 % | WEIGHT: 257.4 LBS | DIASTOLIC BLOOD PRESSURE: 73 MMHG | HEIGHT: 67 IN | SYSTOLIC BLOOD PRESSURE: 111 MMHG | HEART RATE: 70 BPM | BODY MASS INDEX: 40.4 KG/M2

## 2021-06-14 DIAGNOSIS — E66.01 OBESITY, CLASS III, BMI 40-49.9 (MORBID OBESITY) (HCC): Primary | ICD-10-CM

## 2021-06-14 DIAGNOSIS — G47.33 OSA (OBSTRUCTIVE SLEEP APNEA): ICD-10-CM

## 2021-06-14 DIAGNOSIS — E78.00 HYPERCHOLESTEROLEMIA: ICD-10-CM

## 2021-06-14 NOTE — PROGRESS NOTES
Progress Note: Weekly Education Class in the [facility] Weight Loss Program     # of weeks enrolled in Reducing Phase  53    1) Patient is on VLCD [] or LCD [x]    Did patient have any new symptoms or physical problems? Yes []   or  No [x]    If yes, check & comment: weakness [], fatigue [], lightheadedness [], headache [], cramps [], cold intolerance [], hair loss [], diarrhea [], constipation [] or other:                                Has patient been to an ER or Urgent Care? Yes []  or No [x]    If yes, why:                                         2) Number of meal replacements consumed? 14    3) How many ounces of water did patient consume (not including shakes)? 488    Did patient have any problems adhering to the diet? Yes [] or No [x]    If yes, describe situation:                                                        4) How has patient mood overall been this week?  Sad [], Happy [x], Stressed [], Tired [], other            5) Physical Activity Over the Past Week:    Aerobic exercise: 20  min  Resistance exercise:  20 workouts / week

## 2021-06-28 ENCOUNTER — CLINICAL SUPPORT (OUTPATIENT)
Dept: SURGERY | Age: 36
End: 2021-06-28

## 2021-06-28 VITALS
DIASTOLIC BLOOD PRESSURE: 69 MMHG | HEIGHT: 67 IN | SYSTOLIC BLOOD PRESSURE: 112 MMHG | BODY MASS INDEX: 40.48 KG/M2 | HEART RATE: 78 BPM | WEIGHT: 257.9 LBS

## 2021-06-28 DIAGNOSIS — E78.00 HYPERCHOLESTEROLEMIA: ICD-10-CM

## 2021-06-28 DIAGNOSIS — G47.33 OSA (OBSTRUCTIVE SLEEP APNEA): ICD-10-CM

## 2021-06-28 DIAGNOSIS — E66.01 OBESITY, CLASS III, BMI 40-49.9 (MORBID OBESITY) (HCC): Primary | ICD-10-CM

## 2021-06-28 NOTE — PROGRESS NOTES
Progress Note: Weekly Education Class in the [facility]     # of weeks enrolled in Reducing Phase  2    1) Patient is on VLCD [x] or LCD []    Did patient have any new symptoms or physical problems? Yes []   or  No [x]    If yes, check & comment: weakness [], fatigue [], lightheadedness [], headache [], cramps [], cold intolerance [], hair loss [], diarrhea [], constipation [] or other:                              Has patient been to an ER or Urgent Care? Yes []  or No [x]    If yes, why:                                       2) Number of meal replacements consumed? 14    3) How many ounces of water did patient consume (not including shakes)? 540    Did patient have any problems adhering to the diet? Yes [] or No []    If yes, describe situation:                                                      4) How has patient mood overall been this week?  Sad [], Happy [x], Stressed [], Tired [], other          5) Physical Activity Over the Past Week:    Aerobic exercise: 23 min  Resistance exercise: 40min workouts / week

## 2021-07-01 NOTE — PROGRESS NOTES
Nurse note from patient's weekly VLCD / LCD / Maintenance class was reviewed. Pertinent medical concerns were:      No complaints,doing well  BP Readings from Last 3 Encounters:   06/28/21 112/69   06/14/21 111/73   06/02/21 115/74       Weight Metrics 6/28/2021 6/14/2021 6/2/2021 3/4/2021 11/25/2020 4/21/2020 12/9/2019   Weight 257 lb 14.4 oz 257 lb 6.4 oz 258 lb 8 oz 276 lb 9.6 oz 300 lb 8 oz 310 lb 321 lb 6.4 oz   BMI 40.39 kg/m2 40.31 kg/m2 40.49 kg/m2 43.32 kg/m2 47.06 kg/m2 48.55 kg/m2 50.34 kg/m2       Current Outpatient Medications   Medication Sig Dispense Refill    multivitamin (ONE A DAY) tablet Take 1 Tab by mouth daily.

## 2021-07-01 NOTE — PROGRESS NOTES
Nurse note from patient's weekly VLCD / LCD / Maintenance class was reviewed. Pertinent medical concerns were:      Continue same program  BP Readings from Last 3 Encounters:   06/28/21 112/69   06/14/21 111/73   06/02/21 115/74       Weight Metrics 6/28/2021 6/14/2021 6/2/2021 3/4/2021 11/25/2020 4/21/2020 12/9/2019   Weight 257 lb 14.4 oz 257 lb 6.4 oz 258 lb 8 oz 276 lb 9.6 oz 300 lb 8 oz 310 lb 321 lb 6.4 oz   BMI 40.39 kg/m2 40.31 kg/m2 40.49 kg/m2 43.32 kg/m2 47.06 kg/m2 48.55 kg/m2 50.34 kg/m2       Current Outpatient Medications   Medication Sig Dispense Refill    multivitamin (ONE A DAY) tablet Take 1 Tab by mouth daily.

## 2021-07-07 ENCOUNTER — VIRTUAL VISIT (OUTPATIENT)
Dept: SURGERY | Age: 36
End: 2021-07-07
Payer: COMMERCIAL

## 2021-07-07 DIAGNOSIS — E66.01 OBESITY, CLASS III, BMI 40-49.9 (MORBID OBESITY) (HCC): Primary | ICD-10-CM

## 2021-07-07 DIAGNOSIS — G47.33 OSA (OBSTRUCTIVE SLEEP APNEA): ICD-10-CM

## 2021-07-07 DIAGNOSIS — E78.00 HYPERCHOLESTEROLEMIA: ICD-10-CM

## 2021-07-07 PROCEDURE — 99214 OFFICE O/P EST MOD 30 MIN: CPT | Performed by: FAMILY MEDICINE

## 2021-07-07 NOTE — PROGRESS NOTES
[facility] Progress Note:   F/up Physician Visit    Buck Gardner is a 39 y.o. male who was seen by synchronous (real-time) audio-video technology on 7/7/2021. Consent:  He and/or his healthcare decision maker is aware that this patient-initiated Telehealth encounter is a billable service, with coverage as determined by his insurance carrier. He is aware that he may receive a bill and has provided verbal consent to proceed: Yes    I was at home while conducting this encounter. 712  Subjective:   Buck Gardner was seen for Weight Loss  now  Version    June 258  On LCD  f/up physician visit for the VLCD / LCD Program.  Prior to Admission medications    Medication Sig Start Date End Date Taking? Authorizing Provider   multivitamin (ONE A DAY) tablet Take 1 Tab by mouth daily. Yes Provider, Historical     Allergies   Allergen Reactions    Doxycycline Other (comments)     Burning throat and ears       Patient Active Problem List    Diagnosis Date Noted    Chest pain 10/27/2017    Morbidly obese ([address] 10/27/2017     Current Outpatient Medications   Medication Sig Dispense Refill    multivitamin (ONE A DAY) tablet Take 1 Tab by mouth daily. ROS      CC: Weight Management      Buck Gardner is a 39 y.o. male who is here for his      Weight Metrics 6/28/2021 6/14/2021 6/2/2021 3/4/2021 11/25/2020 4/21/2020 12/9/2019   Weight 257 lb 14.4 oz 257 lb 6.4 oz 258 lb 8 oz 276 lb 9.6 oz 300 lb 8 oz 310 lb 321 lb 6.4 oz   BMI 40.39 kg/m2 40.31 kg/m2 40.49 kg/m2 43.32 kg/m2 47.06 kg/m2 48.55 kg/m2 50.34 kg/m2         Outpatient Medications Marked as Taking for the 7/7/21 encounter (Virtual Visit) with Maria Del Carmen Zepeda MD   Medication Sig Dispense Refill    multivitamin (ONE A DAY) tablet Take 1 Tab by mouth daily. Participation   Did you attend clinic and class last week? yes    Review of Systems  Since your last visit, have you experienced any complications? no  If yes, please list:       Are you taking an appetite suppressant? no  If so, is there any Chest Pain, Palpitations or Dizziness? BP Readings from Last 3 Encounters:   06/28/21 112/69   06/14/21 111/73   06/02/21 115/74       SLEEP: usually 8 avg, this week has been different    Have you received any other medical care this week? no  If yes, where and for what? Have you discontinued or changed any medicine or dose of your medicine since your last visit with Dr Hermilo Rao? no  If yes, where and for what? Diet  How many ounces of calorie-free liquids did you consume each day?  80 oz    How many meal replacements did you take each day? 2 and a meal    Did you have any problems adhering to the program?  no If yes, please explain:      Exercise  Aerobic exercise: 3-4 times per week , cardio 20-30 min and weights 1-2 times a week on his own  Resistance exercise: see above workouts / week  Any discomfort?  no     If yes, where? Objective  There were no vitals taken for this visit. No LMP for male patient. PHYSICAL EXAMINATION:  [ INSTRUCTIONS:  \"[x]\" Indicates a positive item  \"[]\" Indicates a negative item  -- DELETE ALL ITEMS NOT EXAMINED]  Vital Signs: (As obtained by patient/caregiver at home)  There were no vitals taken for this visit.      Constitutional: [x] Appears well-developed and well-nourished [x] No apparent distress      [] Abnormal -     Mental status: [x] Alert and awake  [x] Oriented to person/place/time [x] Able to follow commands    [] Abnormal -     Eyes:   EOM    [x]  Normal    [] Abnormal -   Sclera  [x]  Normal    [] Abnormal -          Discharge [x]  None visible   [] Abnormal -     HENT: [x] Normocephalic, atraumatic  [] Abnormal -   [x] Mouth/Throat: Mucous membranes are moist    External Ears [x] Normal  [] Abnormal -    Neck: [x] No visualized mass [] Abnormal -     Pulmonary/Chest: [x] Respiratory effort normal   [x] No visualized signs of difficulty breathing or respiratory distress        [] Abnormal -      Musculoskeletal:   [x] Normal gait with no signs of ataxia         [x] Normal range of motion of neck        [] Abnormal -     Neurological:        [x] No Facial Asymmetry (Cranial nerve 7 motor function) (limited exam due to video visit)          [x] No gaze palsy        [] Abnormal -          Skin:        [x] No significant exanthematous lesions or discoloration noted on facial skin         [] Abnormal -            Psychiatric:       [x] Normal Affect [] Abnormal -        [x] No Hallucinations    Other pertinent observable physical exam findings:-      Assessment / Plan    Encounter Diagnoses   Name Primary?  Obesity, Class III, BMI 40-49.9 (morbid obesity) ([facility] Yes    BMI 40.0-44.9, adult ([facility]     Hypercholesterolemia     GUDELIA (obstructive sleep apnea)      Diagnoses and all orders for this visit:    1. Obesity, Class III, BMI 40-49.9 (morbid obesity) ([facility])  -     METABOLIC PANEL, COMPREHENSIVE; Future  -     LIPID PANEL; Future  Down 7 lbs this month  Doing great  No c/o hunger discomfort  2. BMI 40.0-44.9, adult ([facility]    3. Hypercholesterolemia  -     LIPID PANEL; Future  [place]. Will treat as indicated if there is no decrease  4. GUDELIA (obstructive sleep apnea)  Encouraging use of cpap nightly to help lower cortisol and achieve weight loss goal    1. Weight management improved   Progress was reviewed with patient  Doing great  2. Labs    Latest results reviewed with patient     Needs update now     The primary encounter diagnosis was Obesity, Class III, BMI 40-49.9 (morbid obesity) ([facility] Diagnoses of BMI 40.0-44.9, adult ([facility] Hypercholesterolemia, and GUDELIA (obstructive sleep apnea) were also pertinent to this visit. Coding Help - Use CPT Codes 36707-79689, 10992-39944 for Established and New Patients respectively, either employing EM elements or Time rules. Other codes (example consult codes) may also apply.       We discussed the expected course, resolution and complications of the diagnosis(es) in detail. Medication risks, benefits, costs, interactions, and alternatives were discussed as indicated. I advised him to contact the office if his condition worsens, changes or fails to improve as anticipated. He expressed understanding with the diagnosis(es) and plan. Pursuant to the emergency declaration under the [address] [place] [address] [facility] authority and the [employer] Act, this Virtual  Visit was conducted, with patient's consent, to reduce the patient's risk of exposure to COVID-19 and provide continuity of care for an established patient. Services were provided through a video synchronous discussion virtually to substitute for in-person clinic visit.     Deanna Noriega MD

## 2021-07-07 NOTE — PROGRESS NOTES
Discuss Weight Loss. Virtual Visit. 1. Have you been to the ER, urgent care clinic since your last visit? Hospitalized since your last visit? No    2. Have you seen or consulted any other health care providers outside of the [address] [place] [address] since your last visit? Include any pap smears or colon screening.  No

## 2021-07-12 ENCOUNTER — CLINICAL SUPPORT (OUTPATIENT)
Dept: SURGERY | Age: 36
End: 2021-07-12

## 2021-07-12 VITALS
BODY MASS INDEX: 39.79 KG/M2 | DIASTOLIC BLOOD PRESSURE: 76 MMHG | SYSTOLIC BLOOD PRESSURE: 113 MMHG | TEMPERATURE: 98.3 F | HEIGHT: 67 IN | WEIGHT: 253.5 LBS | HEART RATE: 74 BPM | OXYGEN SATURATION: 99 % | RESPIRATION RATE: 18 BRPM

## 2021-07-12 DIAGNOSIS — G47.33 OSA (OBSTRUCTIVE SLEEP APNEA): ICD-10-CM

## 2021-07-12 DIAGNOSIS — E78.00 HYPERCHOLESTEROLEMIA: ICD-10-CM

## 2021-07-12 DIAGNOSIS — E66.01 OBESITY, CLASS III, BMI 40-49.9 (MORBID OBESITY) (HCC): Primary | ICD-10-CM

## 2021-07-14 ENCOUNTER — OFFICE VISIT (OUTPATIENT)
Dept: SURGERY | Age: 36
End: 2021-07-14

## 2021-07-14 DIAGNOSIS — E66.01 OBESITY, CLASS III, BMI 40-49.9 (MORBID OBESITY) (HCC): Primary | ICD-10-CM

## 2021-07-20 ENCOUNTER — OFFICE VISIT (OUTPATIENT)
Dept: SURGERY | Age: 36
End: 2021-07-20

## 2021-07-20 DIAGNOSIS — E66.01 MORBIDLY OBESE (HCC): Primary | ICD-10-CM

## 2021-07-26 ENCOUNTER — CLINICAL SUPPORT (OUTPATIENT)
Dept: SURGERY | Age: 36
End: 2021-07-26

## 2021-07-26 VITALS
RESPIRATION RATE: 18 BRPM | HEART RATE: 81 BPM | BODY MASS INDEX: 39.36 KG/M2 | OXYGEN SATURATION: 98 % | DIASTOLIC BLOOD PRESSURE: 72 MMHG | WEIGHT: 251.3 LBS | SYSTOLIC BLOOD PRESSURE: 104 MMHG

## 2021-07-26 DIAGNOSIS — G47.33 OSA (OBSTRUCTIVE SLEEP APNEA): ICD-10-CM

## 2021-07-26 DIAGNOSIS — E78.00 HYPERCHOLESTEROLEMIA: ICD-10-CM

## 2021-07-26 DIAGNOSIS — E66.01 OBESITY, CLASS III, BMI 40-49.9 (MORBID OBESITY) (HCC): Primary | ICD-10-CM

## 2021-07-26 NOTE — PROGRESS NOTES
Progress Note: Weekly Education Class in the [facility] Weight Loss Program     1) Patient is on Very Low Calorie Diet [] (4 meal replacements per day, 800 kcal/day)      Low Calorie Diet [x] (2-3 meal replacements per day, 3565-5945 kcal/day)    Did patient have any new symptoms or physical problems? Yes []   or  No [x]    If yes, check & comment: weakness [], fatigue [], lightheadedness [], headache [], cramps [], cold intolerance [], hair loss [], diarrhea [], constipation [],  NA [] other:                  Has patient had any medical attention from other providers, urgent care or the emergency room this week? Yes []  or No [x]       NA [], If yes, why:                        2) Number of meal replacements consumed daily? 2 (range)  NA []    Did you eat any food outside of the program? Yes [] No [x]    3) Average ounces of water patient consumed daily this week (not including shakes)? 74.2    (divide the weekly total by 7)    Any other sugar sweetened beverages consumed this week? Yes []  No [x]    Did patient have any problems adhering to the diet? Yes []  No [x] NA []    If yes, Vacation [], Celebrations [], Conferences [], Family Reunions [] other:                                     4) How has patient mood overall been this week? Sad [], Happy [], Stressed [], Tired [], Content [], NA [], other            5) Physical Activity Over the Past Week:    Cardio exercise: 51 min  Strength exercise:20min workouts / week  Number of steps walked per day:     Medications reconciled by nurse Yes [x]  No[]    Patient was given therapeutic recommendations for any noted side effects of their dietary approach based upon [facility] patient manual per providers recommendation.

## 2021-07-28 ENCOUNTER — OFFICE VISIT (OUTPATIENT)
Dept: SURGERY | Age: 36
End: 2021-07-28

## 2021-07-28 DIAGNOSIS — E66.01 MORBIDLY OBESE (HCC): Primary | ICD-10-CM

## 2021-07-29 NOTE — PROGRESS NOTES
[facility]  Metabolic Weight Loss Program        Patient's Name: Fawad Waddell. : 1985    This patient is enrolled in [address] [place] [address] Weight Loss Program and attended the required weekly virtual nutrition class hosted via [address] [place] [address] today.       Aimee Vazquez, MS, RD, LDN

## 2021-07-30 NOTE — PROGRESS NOTES
Nurse note from patient's weekly VLCD / LCD / Maintenance class was reviewed. Pertinent medical concerns were:      Doing well, down 2 lbs this week  BP Readings from Last 3 Encounters:   07/26/21 104/72   07/12/21 113/76   06/28/21 112/69       Weight Metrics 7/26/2021 7/12/2021 6/28/2021 6/14/2021 6/2/2021 3/4/2021 11/25/2020   Weight 251 lb 4.8 oz 253 lb 8 oz 257 lb 14.4 oz 257 lb 6.4 oz 258 lb 8 oz 276 lb 9.6 oz 300 lb 8 oz   BMI 39.36 kg/m2 39.7 kg/m2 40.39 kg/m2 40.31 kg/m2 40.49 kg/m2 43.32 kg/m2 47.06 kg/m2       Current Outpatient Medications   Medication Sig Dispense Refill    multivitamin (ONE A DAY) tablet Take 1 Tab by mouth daily.        No chsnages needed

## 2021-07-31 NOTE — PROGRESS NOTES
[facility]  Metabolic Weight Loss Program        Patient's Name: Omid Joya. : 1985    This patient is enrolled in [address] [place] [address] Weight Loss Program and attended the required weekly virtual nutrition class hosted via [employer] today.       Red Cervantes, MS, RD, LDN

## 2021-07-31 NOTE — PROGRESS NOTES
[facility]  Metabolic Weight Loss Program        Patient's Name: Buck Restrepo. : 1985    This patient is enrolled in [address] [place] [address] Weight Loss Program and attended the required weekly virtual nutrition class hosted via [employer] today.       Madeline Leone, MS, RD, LDN

## 2021-07-31 NOTE — PROGRESS NOTES
Nurse note from patient's weekly VLCD / LCD / Maintenance class was reviewed. Pertinent medical concerns were:   Down 4 lbs     BP Readings from Last 3 Encounters:   07/26/21 104/72   07/12/21 113/76   06/28/21 112/69       Weight Metrics 7/26/2021 7/12/2021 6/28/2021 6/14/2021 6/2/2021 3/4/2021 11/25/2020   Weight 251 lb 4.8 oz 253 lb 8 oz 257 lb 14.4 oz 257 lb 6.4 oz 258 lb 8 oz 276 lb 9.6 oz 300 lb 8 oz   BMI 39.36 kg/m2 39.7 kg/m2 40.39 kg/m2 40.31 kg/m2 40.49 kg/m2 43.32 kg/m2 47.06 kg/m2       Current Outpatient Medications   Medication Sig Dispense Refill    multivitamin (ONE A DAY) tablet Take 1 Tab by mouth daily.

## 2021-08-04 ENCOUNTER — OFFICE VISIT (OUTPATIENT)
Dept: SURGERY | Age: 36
End: 2021-08-04

## 2021-08-04 DIAGNOSIS — E66.01 MORBIDLY OBESE (HCC): Primary | ICD-10-CM

## 2021-08-10 ENCOUNTER — OFFICE VISIT (OUTPATIENT)
Dept: SURGERY | Age: 36
End: 2021-08-10
Payer: COMMERCIAL

## 2021-08-10 VITALS
HEART RATE: 75 BPM | SYSTOLIC BLOOD PRESSURE: 116 MMHG | RESPIRATION RATE: 18 BRPM | OXYGEN SATURATION: 99 % | BODY MASS INDEX: 39.28 KG/M2 | HEIGHT: 67 IN | DIASTOLIC BLOOD PRESSURE: 74 MMHG | WEIGHT: 250.3 LBS

## 2021-08-10 DIAGNOSIS — E78.00 HYPERCHOLESTEROLEMIA: ICD-10-CM

## 2021-08-10 DIAGNOSIS — E66.01 OBESITY, CLASS III, BMI 40-49.9 (MORBID OBESITY) (HCC): Primary | ICD-10-CM

## 2021-08-10 DIAGNOSIS — G47.33 OSA (OBSTRUCTIVE SLEEP APNEA): ICD-10-CM

## 2021-08-10 PROCEDURE — 99214 OFFICE O/P EST MOD 30 MIN: CPT | Performed by: FAMILY MEDICINE

## 2021-08-10 NOTE — PROGRESS NOTES
[facility] Progress Note:   F/up Physician Visit  LCD  CC: Weight Management  [facility] at [facility] is a 39 y.o. male who is here for his  f/up physician visit for the VLCD / LCD Program.    Weight Metrics 8/10/2021 8/10/2021 7/26/2021 7/12/2021 6/28/2021 6/14/2021 6/2/2021   Weight - 250 lb 4.8 oz 251 lb 4.8 oz 253 lb 8 oz 257 lb 14.4 oz 257 lb 6.4 oz 258 lb 8 oz   Neck Circ (inches) 14.75 - - - - - -   Waist Measure Inches 44.5 - - - - - -   Body Fat % 32.9 - - - - - -   BMI - 39.2 kg/m2 39.36 kg/m2 39.7 kg/m2 40.39 kg/m2 40.31 kg/m2 40.49 kg/m2               Participation   Did you attend clinic and class last week? yes    Review of Systems  Since your last visit, have you experienced any complications? no  If yes, please list:       Are you taking an appetite suppressant? no  If so, is there any Chest Pain, Palpitations or Dizziness? BP Readings from Last 3 Encounters:   08/10/21 116/74   07/26/21 104/72   07/12/21 113/76       SLEEP: 7      Have you received any other medical care this week? no  If yes, where and for what? Have you discontinued or changed any medicine or dose of your medicine since your last visit with Dr Jj Hdz? no  If yes, where and for what? Diet  How many ounces of calorie-free liquids did you consume each day?  80 oz    How many meal replacements did you take each day? 2  And a regular meal  Did you have any problems adhering to the program?  no If yes, please explain:      Exercise  Aerobic exercise: calisthetics, weight lifting and cardio  4 days a week min  Resistance exercise: see above workouts / week  Any discomfort?  no     If yes, where? Objective  Visit Vitals  /74 (BP 1 Location: Left arm, BP Patient Position: Sitting)   Pulse 75   Resp 18   Ht 5' 7\" (1.702 m)   Wt 250 lb 4.8 oz (113.5 kg)   SpO2 99%   BMI 39.20 kg/m²     No LMP for male patient.       Physical Exam  Appearance: well,   Mental:A&O x 3, NAD  H:NC/AT,  EENT:   EOMI, PERRL, No scleral icterus  Neck: no bruit or JVD  Lung: clear, No W/R  ABD: soft, active, nontender  Ext:  no Edema  Neuro: nonfocal  Assessment / Plan    Encounter Diagnoses   Name Primary?  Obesity, Class III, BMI 40-49.9 (morbid obesity) ([facility] Yes    BMI 40.0-44.9, adult ([facility])     Hypercholesterolemia     GUDELIA (obstructive sleep apnea)      Diagnoses and all orders for this visit:    1. Obesity, Class III, BMI 40-49.9 (morbid obesity) ([facility])  Cont the LCD, he is doing well with the weight loss  2. BMI 40.0-44.9, adult ([facility]    3. Hypercholesterolemia    4. GUDELIA (obstructive sleep apnea)  I encourage using the cpap  Other orders  -     METABOLIC PANEL, COMPREHENSIVE  -     LIPID PANEL      1. Weight management improved   Progress was reviewed with patient    2. Labs    Latest results reviewed with patient       face to face time with Jensen Leone consisted of counseling & coordinating and/or discussing treatment plans in reference to his obesity The primary encounter diagnosis was Obesity, Class III, BMI 40-49.9 (morbid obesity) ([facility] Diagnoses of BMI 40.0-44.9, adult ([address] Hypercholesterolemia, and GUDELIA (obstructive sleep apnea) were also pertinent to this visit.

## 2021-08-11 ENCOUNTER — CLINICAL SUPPORT (OUTPATIENT)
Dept: SURGERY | Age: 36
End: 2021-08-11

## 2021-08-11 DIAGNOSIS — E66.9 OBESITY (BMI 30-39.9): Primary | ICD-10-CM

## 2021-08-11 LAB
ALBUMIN SERPL-MCNC: 4.3 G/DL (ref 4–5)
ALBUMIN/GLOB SERPL: 1.4 {RATIO} (ref 1.2–2.2)
ALP SERPL-CCNC: 84 IU/L (ref 48–121)
ALT SERPL-CCNC: 12 IU/L (ref 0–44)
AST SERPL-CCNC: 16 IU/L (ref 0–40)
BILIRUB SERPL-MCNC: 0.5 MG/DL (ref 0–1.2)
BUN SERPL-MCNC: 13 MG/DL (ref 6–20)
BUN/CREAT SERPL: 18 (ref 9–20)
CALCIUM SERPL-MCNC: 9.4 MG/DL (ref 8.7–10.2)
CHLORIDE SERPL-SCNC: 104 MMOL/L (ref 96–106)
CHOLEST SERPL-MCNC: 167 MG/DL (ref 100–199)
CO2 SERPL-SCNC: 26 MMOL/L (ref 20–29)
CREAT SERPL-MCNC: 0.73 MG/DL (ref 0.76–1.27)
GLOBULIN SER CALC-MCNC: 3.1 G/DL (ref 1.5–4.5)
GLUCOSE SERPL-MCNC: 90 MG/DL (ref 65–99)
HDLC SERPL-MCNC: 50 MG/DL
LDLC SERPL CALC-MCNC: 105 MG/DL (ref 0–99)
POTASSIUM SERPL-SCNC: 4.4 MMOL/L (ref 3.5–5.2)
PROT SERPL-MCNC: 7.4 G/DL (ref 6–8.5)
SODIUM SERPL-SCNC: 142 MMOL/L (ref 134–144)
TRIGL SERPL-MCNC: 61 MG/DL (ref 0–149)
VLDLC SERPL CALC-MCNC: 12 MG/DL (ref 5–40)

## 2021-08-13 NOTE — PROGRESS NOTES
The liver and kidney functions are normal  The potassium and sodium are normal  Your cholesterol numbers are not at goal. To provide you with the best heart health the LDL should be under 100 if you have no heart disease or history of diabetes. If you have a history of diabetes or heart disease the LDL goal should be less than 70. your LDL is 105 so is not quite at goal  Avoid fried food, fast food and junk food. Also move more each day. aim for at least 150 minutes of activity each week.  I want to recheck the cholesterol levels in three months   The weight management process will be helpful for lowering the cholesterol

## 2021-08-16 NOTE — PROGRESS NOTES
[facility]  Metabolic Weight Loss Program        Patient's Name: Dong Cook. : 1985    This patient is enrolled in [address] [place] [address] Weight Loss Program and attended the required weekly virtual nutrition class hosted via [employer].       Adri Camargo RD

## 2021-08-27 ENCOUNTER — CLINICAL SUPPORT (OUTPATIENT)
Dept: SURGERY | Age: 36
End: 2021-08-27

## 2021-08-27 VITALS
WEIGHT: 244.5 LBS | DIASTOLIC BLOOD PRESSURE: 78 MMHG | SYSTOLIC BLOOD PRESSURE: 106 MMHG | HEIGHT: 67 IN | BODY MASS INDEX: 38.37 KG/M2 | RESPIRATION RATE: 18 BRPM | OXYGEN SATURATION: 96 % | HEART RATE: 83 BPM

## 2021-08-27 DIAGNOSIS — E66.01 OBESITY, CLASS III, BMI 40-49.9 (MORBID OBESITY) (HCC): Primary | ICD-10-CM

## 2021-08-27 DIAGNOSIS — G47.33 OSA (OBSTRUCTIVE SLEEP APNEA): ICD-10-CM

## 2021-08-27 DIAGNOSIS — E78.00 HYPERCHOLESTEROLEMIA: ICD-10-CM

## 2021-08-27 NOTE — PROGRESS NOTES
Progress Note: Weekly Education Class in the [facility] Weight Loss Program         Patient is on Very Low Calorie Diet [] (4 meal replacements per day, 800 kcal/day)      Low Calorie Diet [] (2-3 meal replacements per day, 7926-4188 kcal/day)    1) Did patient have any new symptoms or physical problems? Yes []    No [x]    If yes, check & comment: weakness [], fatigue [], lightheadedness [], headache [], cramps [], cold intolerance [], hair loss [], diarrhea [], constipation [],  NA [] other:                                 2) Has patient had any medical attention from other providers, urgent care or the emergency room this week? Yes []  No [x]       NA [], If yes, why:                                      3) Any other sugar sweetened beverages consumed this week? Yes []  No [x]    4) Did patient have any problems adhering to the diet? Yes []  No [x] NA []    If yes, Vacation [], Celebrations [], Conferences [], Family Reunions [] other:                                                5) How many hours of sleep this week?    (range)  NA [x]    Number of meal replacements consumed daily? 2 (range)  NA []    Average ounces of water patient consumed daily this week (not including shakes)? 80    (divide the weekly total by 7)    Did you eat any food outside of the program? Yes [x] No []    Physical Activity Over the Past Week:    Cardio exercise: 62 min  Strength exercise: 0 workouts / week  Number of steps walked per day: n/a    How has patient mood overall been this week? Sad [], Happy [], Stressed [], Tired [], Content [], NA [x], other            Medications reconciled by nurse Yes [x]  No[]    Patient was given therapeutic recommendations for any noted side effects of their dietary approach based upon [facility] patient manual per providers recommendation.

## 2021-08-31 NOTE — PROGRESS NOTES
[facility]  Metabolic Weight Loss Program        Patient's Name: Jaxon Parada. : 1985    This patient is enrolled in [address] [place] [address] Weight Loss Program and attended the required weekly virtual nutrition class hosted via [address] [place] [address] today.       Vidal Miramontes, MS, RD, LDN

## 2021-09-01 NOTE — PROGRESS NOTES
Nurse note from patient's weekly VLCD / LCD / Maintenance class was reviewed. Pertinent medical concerns were:   Doing well     BP Readings from Last 3 Encounters:   08/27/21 106/78   08/10/21 116/74   07/26/21 104/72       Weight Metrics 8/27/2021 8/10/2021 8/10/2021 7/26/2021 7/12/2021 6/28/2021 6/14/2021   Weight 244 lb 8 oz - 250 lb 4.8 oz 251 lb 4.8 oz 253 lb 8 oz 257 lb 14.4 oz 257 lb 6.4 oz   Neck Circ (inches) - 14.75 - - - - -   Waist Measure Inches - 44.5 - - - - -   Body Fat % - 32.9 - - - - -   BMI 38.29 kg/m2 - 39.2 kg/m2 39.36 kg/m2 39.7 kg/m2 40.39 kg/m2 40.31 kg/m2       Current Outpatient Medications   Medication Sig Dispense Refill    multivitamin (ONE A DAY) tablet Take 1 Tab by mouth daily.

## 2021-09-02 ENCOUNTER — OFFICE VISIT (OUTPATIENT)
Dept: SURGERY | Age: 36
End: 2021-09-02

## 2021-09-02 DIAGNOSIS — E66.9 OBESITY (BMI 30-39.9): Primary | ICD-10-CM

## 2021-09-08 ENCOUNTER — VIRTUAL VISIT (OUTPATIENT)
Dept: SURGERY | Age: 36
End: 2021-09-08
Payer: COMMERCIAL

## 2021-09-08 ENCOUNTER — OFFICE VISIT (OUTPATIENT)
Dept: SURGERY | Age: 36
End: 2021-09-08

## 2021-09-08 VITALS — WEIGHT: 243 LBS | BODY MASS INDEX: 36.83 KG/M2 | HEIGHT: 68 IN

## 2021-09-08 DIAGNOSIS — E66.9 OBESITY (BMI 30-39.9): ICD-10-CM

## 2021-09-08 DIAGNOSIS — G47.33 OSA (OBSTRUCTIVE SLEEP APNEA): ICD-10-CM

## 2021-09-08 DIAGNOSIS — E66.9 OBESITY (BMI 30-39.9): Primary | ICD-10-CM

## 2021-09-08 DIAGNOSIS — E78.00 HYPERCHOLESTEROLEMIA: ICD-10-CM

## 2021-09-08 DIAGNOSIS — E66.01 OBESITY, CLASS III, BMI 40-49.9 (MORBID OBESITY) (HCC): Primary | ICD-10-CM

## 2021-09-08 PROCEDURE — 99214 OFFICE O/P EST MOD 30 MIN: CPT | Performed by: FAMILY MEDICINE

## 2021-09-08 NOTE — PROGRESS NOTES
[facility] Progress Note:   F/up Physician Visit    Noy Daily is a 39 y.o. male who was seen by synchronous (real-time) audio-video technology on 9/8/2021. Consent:  He and/or his healthcare decision maker is aware that this patient-initiated Telehealth encounter is a billable service, with coverage as determined by his insurance carrier. He is aware that he may receive a bill and has provided verbal consent to proceed: Yes    I was at home while conducting this encounter. 712  Subjective:   Noy Sorenson was seen for Follow-up ([phone])  now 35  Start 320  Appetite has increased  Eating kale chips and carrots    f/up physician visit for the LCD Program.  Prior to Admission medications    Medication Sig Start Date End Date Taking? Authorizing Provider   multivitamin (ONE A DAY) tablet Take 1 Tab by mouth daily. Yes Provider, Historical     Allergies   Allergen Reactions    Doxycycline Other (comments)     Burning throat and ears       Patient Active Problem List    Diagnosis Date Noted    Chest pain 10/27/2017    Morbidly obese ([address] 10/27/2017     Current Outpatient Medications   Medication Sig Dispense Refill    multivitamin (ONE A DAY) tablet Take 1 Tab by mouth daily. ROS      CC: Weight Management      Noy Daily is a 39 y.o. male who is here for his      Weight Metrics 9/13/2021 9/8/2021 8/27/2021 8/10/2021 8/10/2021 7/26/2021 7/12/2021   Weight 241 lb 4.8 oz 243 lb 244 lb 8 oz - 250 lb 4.8 oz 251 lb 4.8 oz 253 lb 8 oz   Neck Circ (inches) - - - 14.75 - - -   Waist Measure Inches - - - 44.5 - - -   Body Fat % - - - 32.9 - - -   BMI 36.69 kg/m2 36.95 kg/m2 38.29 kg/m2 - 39.2 kg/m2 39.36 kg/m2 39.7 kg/m2         Outpatient Medications Marked as Taking for the 9/8/21 encounter (Virtual Visit) with Noah Awan MD   Medication Sig Dispense Refill    multivitamin (ONE A DAY) tablet Take 1 Tab by mouth daily.            Participation Did you attend clinic and class last week? yes    Review of Systems  Since your last visit, have you experienced any complications? no  If yes, please list:       Are you taking an appetite suppressant? no  If so, is there any Chest Pain, Palpitations or Dizziness? BP Readings from Last 3 Encounters:   09/13/21 108/72   08/27/21 106/78   08/10/21 116/74       SLEEP:  7    Have you received any other medical care this week? no  If yes, where and for what? Have you discontinued or changed any medicine or dose of your medicine since your last visit with Dr Anisha Lau? no  If yes, where and for what? Diet  How many ounces of calorie-free liquids did you consume each day?  80 oz    How many meal replacements did you take each day? 2 and a meal    Did you have any problems adhering to the program?  no If yes, please explain:      Exercise  Aerobic exercise: cardio and weighs 40 min 4 times a week  Resistance exercise: see above workouts / week  Any discomfort?  no     If yes, where? Objective  Visit Vitals   5' 8\" (1.727 m)   Wt 243 lb (110.2 kg)   BMI 36.95 kg/m²     No LMP for male patient.                     PHYSICAL EXAMINATION:  [ INSTRUCTIONS:  \"[x]\" Indicates a positive item  \"[]\" Indicates a negative item  -- DELETE ALL ITEMS NOT EXAMINED]  Vital Signs: (As obtained by patient/caregiver at home)  Visit Vitals  Ht 5' 8\" (1.727 m)   Wt 243 lb (110.2 kg)   BMI 36.95 kg/m²        Constitutional: [x] Appears well-developed and well-nourished [x] No apparent distress      [] Abnormal -     Mental status: [x] Alert and awake  [x] Oriented to person/place/time [x] Able to follow commands    [] Abnormal -     Eyes:   EOM    [x]  Normal    [] Abnormal -   Sclera  [x]  Normal    [] Abnormal -          Discharge [x]  None visible   [] Abnormal -     HENT: [x] Normocephalic, atraumatic  [] Abnormal -   [x] Mouth/Throat: Mucous membranes are moist    External Ears [x] Normal  [] Abnormal -    Neck: [x] No visualized mass [] Abnormal -     Pulmonary/Chest: [x] Respiratory effort normal   [x] No visualized signs of difficulty breathing or respiratory distress        [] Abnormal -      Musculoskeletal:   [x] Normal gait with no signs of ataxia         [x] Normal range of motion of neck        [] Abnormal -     Neurological:        [x] No Facial Asymmetry (Cranial nerve 7 motor function) (limited exam due to video visit)          [x] No gaze palsy        [] Abnormal -          Skin:        [x] No significant exanthematous lesions or discoloration noted on facial skin         [] Abnormal -            Psychiatric:       [x] Normal Affect [] Abnormal -        [x] No Hallucinations    Other pertinent observable physical exam findings:-      Assessment / Plan    Encounter Diagnoses   Name Primary?  Obesity, Class III, BMI 40-49.9 (morbid obesity) (HCC) Yes    Obesity (BMI 30-39. 9)     Hypercholesterolemia     GUDELIA (obstructive sleep apnea)      Diagnoses and all orders for this visit:    1. Obesity, Class III, BMI 40-49.9 (morbid obesity) (HCC)  ND LCD, continue  2. Obesity (BMI 30-39.9)    3. Hypercholesterolemia  LDL still not at goal. Cont exercise and the LCD diet/ low fat and low carb  4. GUDELIA (obstructive sleep apnea)  Cont cpap    1. Weight management improved   Progress was reviewed with patient    2. Labs    Latest results reviewed with patient          minutes face to face time with Keshia Moyer consisted of counseling & coordinating and/or discussing treatment plans in reference to his obesity The primary encounter diagnosis was Obesity, Class III, BMI 40-49.9 (morbid obesity) ([facility] [address] Diagnoses of Obesity (BMI 30-39.9), Hypercholesterolemia, and GUDELIA (obstructive sleep apnea) were also pertinent to this visit. Coding Help - Use CPT Codes 40410-95543, 41995-91343 for Established and New Patients respectively, either employing EM elements or Time rules.  Other codes (example consult codes) may also apply. We discussed the expected course, resolution and complications of the diagnosis(es) in detail. Medication risks, benefits, costs, interactions, and alternatives were discussed as indicated. I advised him to contact the office if his condition worsens, changes or fails to improve as anticipated. He expressed understanding with the diagnosis(es) and plan. Pursuant to the emergency declaration under the [address] [place] [address] waiver authority and the [employer] and [employer] Act, this Virtual  Visit was conducted, with patient's consent, to reduce the patient's risk of exposure to COVID-19 and provide continuity of care for an established patient. Services were provided through a video synchronous discussion virtually to substitute for in-person clinic visit.     Howard Flores MD

## 2021-09-08 NOTE — PROGRESS NOTES
1. Have you been to the ER, urgent care clinic since your last visit? Hospitalized since your last visit? No    2. Have you seen or consulted any other health care providers outside of the [address] [place] [address] since your last visit? Include any pap smears or colon screening.  No

## 2021-09-13 ENCOUNTER — CLINICAL SUPPORT (OUTPATIENT)
Dept: SURGERY | Age: 36
End: 2021-09-13

## 2021-09-13 VITALS
DIASTOLIC BLOOD PRESSURE: 72 MMHG | WEIGHT: 241.3 LBS | SYSTOLIC BLOOD PRESSURE: 108 MMHG | HEART RATE: 84 BPM | HEIGHT: 68 IN | OXYGEN SATURATION: 97 % | BODY MASS INDEX: 36.57 KG/M2 | RESPIRATION RATE: 18 BRPM

## 2021-09-13 DIAGNOSIS — E78.00 HYPERCHOLESTEROLEMIA: ICD-10-CM

## 2021-09-13 DIAGNOSIS — G47.33 OSA (OBSTRUCTIVE SLEEP APNEA): ICD-10-CM

## 2021-09-13 DIAGNOSIS — E66.9 OBESITY (BMI 30-39.9): Primary | ICD-10-CM

## 2021-09-13 NOTE — PROGRESS NOTES
Progress Note: Weekly Education Class in the [facility] Weight Loss Program         Patient is on Very Low Calorie Diet [] (4 meal replacements per day, 800 kcal/day)      Low Calorie Diet [x] (2-3 meal replacements per day, 3126-4119 kcal/day)    1) Did patient have any new symptoms or physical problems? Yes []    No [x]    If yes, check & comment: weakness [], fatigue [], lightheadedness [], headache [], cramps [], cold intolerance [], hair loss [], diarrhea [], constipation [],  NA [] other:                                2) Has patient had any medical attention from other providers, urgent care or the emergency room this week? Yes []  No [x]       NA [], If yes, why:                                       3) Any other sugar sweetened beverages consumed this week? Yes []  No [x]    4) Did patient have any problems adhering to the diet? Yes []  No [x] NA []    If yes, Vacation [], Celebrations [], Conferences [], Family Reunions [] other:                                                5) How many hours of sleep this week?     (range)  NA [x]    Number of meal replacements consumed daily? 2 (range)  NA []    Average ounces of water patient consumed daily this week (not including shakes)? 80     (divide the weekly total by 7)    Did you eat any food outside of the program? Yes [x] No []    Physical Activity Over the Past Week:    Cardio exercise: 100 min  Strength exercise: 4 workouts / week  Number of steps walked per day: n/a    How has patient mood overall been this week? Sad [], Happy [], Stressed [], Tired [], Content [], NA [x], other            Medications reconciled by nurse Yes [x]  No[]    Patient was given therapeutic recommendations for any noted side effects of their dietary approach based upon [facility] patient manual per providers recommendation.

## 2021-09-15 ENCOUNTER — CLINICAL SUPPORT (OUTPATIENT)
Dept: SURGERY | Age: 36
End: 2021-09-15

## 2021-09-15 DIAGNOSIS — E66.01 OBESITY, CLASS III, BMI 40-49.9 (MORBID OBESITY) (HCC): Primary | ICD-10-CM

## 2021-09-16 NOTE — PROGRESS NOTES
[facility]  Metabolic Weight Loss Program        Patient's Name: Jackie Nice. : 1985    This patient is enrolled in [address] [place] [address] Weight Loss Program and attended the required weekly virtual nutrition class hosted via [address] [place] [address] today.       Lillian Short RD

## 2021-09-16 NOTE — PROGRESS NOTES
[facility]  Metabolic Weight Loss Program        Patient's Name: Uri Gonzales. : 1985    This patient is enrolled in [address] [place] [address] Weight Loss Program and attended the required weekly virtual nutrition class hosted via [address] [place] [address] today.       Ramu Ferrell, MS, RD, LDN

## 2021-09-17 NOTE — PROGRESS NOTES
[facility]  Metabolic Weight Loss Program        Patient's Name: Dong Cook. : 1985    This patient is enrolled in [address] [place] [address] Weight Loss Program and attended the required weekly virtual nutrition class hosted via [employer].       Ale Natarajan, MS, RD, LDN

## 2021-09-22 ENCOUNTER — OFFICE VISIT (OUTPATIENT)
Dept: SURGERY | Age: 36
End: 2021-09-22

## 2021-09-22 DIAGNOSIS — E66.01 OBESITY, CLASS III, BMI 40-49.9 (MORBID OBESITY) (HCC): Primary | ICD-10-CM

## 2021-09-26 NOTE — PROGRESS NOTES
Nurse note from patient's weekly VLCD / LCD / Maintenance class was reviewed. Pertinent medical concerns were:   Doing well     BP Readings from Last 3 Encounters:   09/13/21 108/72   08/27/21 106/78   08/10/21 116/74       Weight Metrics 9/13/2021 9/8/2021 8/27/2021 8/10/2021 8/10/2021 7/26/2021 7/12/2021   Weight 241 lb 4.8 oz 243 lb 244 lb 8 oz - 250 lb 4.8 oz 251 lb 4.8 oz 253 lb 8 oz   Neck Circ (inches) - - - 14.75 - - -   Waist Measure Inches - - - 44.5 - - -   Body Fat % - - - 32.9 - - -   BMI 36.69 kg/m2 36.95 kg/m2 38.29 kg/m2 - 39.2 kg/m2 39.36 kg/m2 39.7 kg/m2       Current Outpatient Medications   Medication Sig Dispense Refill    multivitamin (ONE A DAY) tablet Take 1 Tab by mouth daily.

## 2021-09-27 ENCOUNTER — CLINICAL SUPPORT (OUTPATIENT)
Dept: SURGERY | Age: 36
End: 2021-09-27

## 2021-09-27 VITALS
HEART RATE: 92 BPM | WEIGHT: 239.7 LBS | SYSTOLIC BLOOD PRESSURE: 112 MMHG | HEIGHT: 68 IN | OXYGEN SATURATION: 98 % | DIASTOLIC BLOOD PRESSURE: 72 MMHG | BODY MASS INDEX: 36.33 KG/M2 | RESPIRATION RATE: 18 BRPM

## 2021-09-27 DIAGNOSIS — E78.00 HYPERCHOLESTEROLEMIA: ICD-10-CM

## 2021-09-27 DIAGNOSIS — E66.9 OBESITY (BMI 30-39.9): Primary | ICD-10-CM

## 2021-09-27 DIAGNOSIS — G47.33 OSA (OBSTRUCTIVE SLEEP APNEA): ICD-10-CM

## 2021-09-27 NOTE — PROGRESS NOTES
9Progress Note: Weekly Education Class in the [facility] Weight Loss Program         Patient is on Very Low Calorie Diet [] (4 meal replacements per day, 800 kcal/day)      Low Calorie Diet [x] (2-3 meal replacements per day, 9027-6228 kcal/day)    1) Did patient have any new symptoms or physical problems? Yes []    No [x]    If yes, check & comment: weakness [], fatigue [], lightheadedness [], headache [], cramps [], cold intolerance [], hair loss [], diarrhea [], constipation [],  NA [] other:                                 2) Has patient had any medical attention from other providers, urgent care or the emergency room this week? Yes []  No [x]       NA [], If yes, why:                                      3) Any other sugar sweetened beverages consumed this week? Yes []  No [x]    4) Did patient have any problems adhering to the diet? Yes []  No [x] NA [x]    If yes, Vacation [], Celebrations [], Conferences [], Family Reunions [] other:                                             5) How many hours of sleep this week?     (range)  NA [x]    Number of meal replacements consumed daily? 2(range)  NA []    Average ounces of water patient consumed daily this week (not including shakes)? 80    (divide the weekly total by 7)    Did you eat any food outside of the program? Yes [x] No []    Physical Activity Over the Past Week:    Cardio exercise: 13 min  Strength exercise: 2 workouts / week  Number of steps walked per day: n/a    How has patient mood overall been this week? Sad [], Happy [], Stressed [], Tired [], Content [], NA [x], other           Medications reconciled by nurse Yes [x]  No[]    Patient was given therapeutic recommendations for any noted side effects of their dietary approach based upon [facility] patient manual per providers recommendation.

## 2021-09-28 NOTE — PROGRESS NOTES
[facility]  Metabolic Weight Loss Program        Patient's Name: Ashwini Bran. : 1985    This patient is enrolled in [address] [place] [address] Weight Loss Program and attended the required weekly virtual nutrition class hosted via [address] [place] [address] today.       Sharri Rosa, MS, RD, LDN

## 2021-09-29 NOTE — PROGRESS NOTES
Nurse note from patient's weekly VLCD / LCD / Maintenance class was reviewed. Pertinent medical concerns were:   bp good   Down 2 lbs  Doing well  BP Readings from Last 3 Encounters:   09/27/21 112/72   09/13/21 108/72   08/27/21 106/78       Weight Metrics 9/27/2021 9/13/2021 9/8/2021 8/27/2021 8/10/2021 8/10/2021 7/26/2021   Weight 239 lb 11.2 oz 241 lb 4.8 oz 243 lb 244 lb 8 oz - 250 lb 4.8 oz 251 lb 4.8 oz   Neck Circ (inches) - - - - 14.75 - -   Waist Measure Inches - - - - 44.5 - -   Body Fat % - - - - 32.9 - -   BMI 36.45 kg/m2 36.69 kg/m2 36.95 kg/m2 38.29 kg/m2 - 39.2 kg/m2 39.36 kg/m2       Current Outpatient Medications   Medication Sig Dispense Refill    multivitamin (ONE A DAY) tablet Take 1 Tab by mouth daily.

## 2021-09-30 ENCOUNTER — OFFICE VISIT (OUTPATIENT)
Dept: SURGERY | Age: 36
End: 2021-09-30

## 2021-09-30 DIAGNOSIS — E66.9 OBESITY (BMI 30-39.9): Primary | ICD-10-CM

## 2021-10-05 NOTE — PROGRESS NOTES
[facility]  Metabolic Weight Loss Program        Patient's Name: Sachi Villa. : 1985    This patient is enrolled in [address] [place] [address] Weight Loss Program and attended the required weekly virtual nutrition class hosted via [employer].       Roro Ahmadi, MS, RD, LDN

## 2021-10-06 ENCOUNTER — VIRTUAL VISIT (OUTPATIENT)
Dept: SURGERY | Age: 36
End: 2021-10-06
Payer: COMMERCIAL

## 2021-10-06 DIAGNOSIS — E66.9 OBESITY (BMI 30-39.9): Primary | ICD-10-CM

## 2021-10-06 DIAGNOSIS — G47.33 OSA (OBSTRUCTIVE SLEEP APNEA): ICD-10-CM

## 2021-10-06 DIAGNOSIS — E78.00 HYPERCHOLESTEROLEMIA: ICD-10-CM

## 2021-10-06 PROCEDURE — 99214 OFFICE O/P EST MOD 30 MIN: CPT | Performed by: FAMILY MEDICINE

## 2021-10-06 NOTE — PROGRESS NOTES
[facility] Progress Note:   F/up Physician Visit    Ambreen Rizvi is a 39 y.o. male who was seen by synchronous (real-time) audio-video technology on 10/6/2021. Consent:  He and/or his healthcare decision maker is aware that this patient-initiated Telehealth encounter is a billable service, with coverage as determined by his insurance carrier. He is aware that he may receive a bill and has provided verbal consent to proceed: Yes    I was at home while conducting this encounter. 712  Subjective:   Ambreen Johnson was seen for Weight Management    f/up physician visit for the VLCD / LCD Program.  Prior to Admission medications    Medication Sig Start Date End Date Taking? Authorizing Provider   multivitamin (ONE A DAY) tablet Take 1 Tab by mouth daily. Yes Provider, Historical     Allergies   Allergen Reactions    Doxycycline Other (comments)     Burning throat and ears       Patient Active Problem List    Diagnosis Date Noted    Chest pain 10/27/2017    Morbidly obese ([address] 10/27/2017     Current Outpatient Medications   Medication Sig Dispense Refill    multivitamin (ONE A DAY) tablet Take 1 Tab by mouth daily. ROS      CC: Weight Management      Ambreen Rizvi is a 39 y.o. male who is here for his      Weight Metrics 9/27/2021 9/13/2021 9/8/2021 8/27/2021 8/10/2021 8/10/2021 7/26/2021   Weight 239 lb 11.2 oz 241 lb 4.8 oz 243 lb 244 lb 8 oz - 250 lb 4.8 oz 251 lb 4.8 oz   Neck Circ (inches) - - - - 14.75 - -   Waist Measure Inches - - - - 44.5 - -   Body Fat % - - - - 32.9 - -   BMI 36.45 kg/m2 36.69 kg/m2 36.95 kg/m2 38.29 kg/m2 - 39.2 kg/m2 39.36 kg/m2         Outpatient Medications Marked as Taking for the 10/6/21 encounter (Virtual Visit) with Fransisco Levi MD   Medication Sig Dispense Refill    multivitamin (ONE A DAY) tablet Take 1 Tab by mouth daily. Participation   Did you attend clinic and class last week?  yes    Review of Systems  Since your last visit, have you experienced any complications? no  If yes, please list:     Are you taking an appetite suppressant? no  If so, is there any Chest Pain, Palpitations or Dizziness? BP Readings from Last 3 Encounters:   09/27/21 112/72   09/13/21 108/72   08/27/21 106/78       SLEEP:  6    Have you received any other medical care this week? no  If yes, where and for what? Have you discontinued or changed any medicine or dose of your medicine since your last visit with Dr Poncho Hernandez? no  If yes, where and for what? Diet  How many ounces of calorie-free liquids did you consume each day?  80 oz    How many meal replacements did you take each day? 2 andameal    Did you have any problems adhering to the program?  no If yes, please explain:      Exercise  Aerobic exercise: none consistently min  Resistance exercise:  workouts / week  Any discomfort?  no     If yes, where? Objective  There were no vitals taken for this visit. No LMP for male patient. PHYSICAL EXAMINATION:  [ INSTRUCTIONS:  \"[x]\" Indicates a positive item  \"[]\" Indicates a negative item  -- DELETE ALL ITEMS NOT EXAMINED]  Vital Signs: (As obtained by patient/caregiver at home)  There were no vitals taken for this visit.      Constitutional: [x] Appears well-developed and well-nourished [x] No apparent distress      [] Abnormal -     Mental status: [x] Alert and awake  [x] Oriented to person/place/time [x] Able to follow commands    [] Abnormal -     Eyes:   EOM    [x]  Normal    [] Abnormal -   Sclera  [x]  Normal    [] Abnormal -          Discharge [x]  None visible   [] Abnormal -     HENT: [x] Normocephalic, atraumatic  [] Abnormal -   [x] Mouth/Throat: Mucous membranes are moist    External Ears [x] Normal  [] Abnormal -    Neck: [x] No visualized mass [] Abnormal -     Pulmonary/Chest: [x] Respiratory effort normal   [x] No visualized signs of difficulty breathing or respiratory distress [] Abnormal -      Musculoskeletal:   [x] Normal gait with no signs of ataxia         [x] Normal range of motion of neck        [] Abnormal -     Neurological:        [x] No Facial Asymmetry (Cranial nerve 7 motor function) (limited exam due to video visit)          [x] No gaze palsy        [] Abnormal -          Skin:        [x] No significant exanthematous lesions or discoloration noted on facial skin         [] Abnormal -            Psychiatric:       [x] Normal Affect [] Abnormal -        [x] No Hallucinations    Other pertinent observable physical exam findings:-      Assessment / Plan    Encounter Diagnoses   Name Primary?  Obesity (BMI 30-39. 9) Yes    Hypercholesterolemia     GUDELIA (obstructive sleep apnea)      Diagnoses and all orders for this visit:    1. Obesity (BMI 30-39. 9)  Goal for this month is a min 30 min 5 days a week of exercise  Consistently    Cont ND LCD  2. Hypercholesterolemia  Cont to mnitor  3. GUDELIA (obstructive sleep apnea)  Cont cpap    1. Weight management improved   Progress was reviewed with patient    2. Labs    Latest results reviewed with patient          minutes face to face time with Diana Conception consisted of counseling & coordinating and/or discussing treatment plans in reference to his obesity The primary encounter diagnosis was Obesity (BMI 30-39.9). Diagnoses of Hypercholesterolemia and GUDELIA (obstructive sleep apnea) were also pertinent to this visit. Coding Help - Use CPT Codes 81953-36257, [id number] for Established and New Patients respectively, either employing EM elements or Time rules. Other codes (example consult codes) may also apply. We discussed the expected course, resolution and complications of the diagnosis(es) in detail. Medication risks, benefits, costs, interactions, and alternatives were discussed as indicated. I advised him to contact the office if his condition worsens, changes or fails to improve as anticipated.  He expressed understanding with the diagnosis(es) and plan. Pursuant to the emergency declaration under the [facility] waiver authority and the [employer] and [employer] Act, this Virtual  Visit was conducted, with patient's consent, to reduce the patient's risk of exposure to COVID-19 and provide continuity of care for an established patient. Services were provided through a video synchronous discussion virtually to substitute for in-person clinic visit.     Bello Parikh MD

## 2021-10-07 ENCOUNTER — OFFICE VISIT (OUTPATIENT)
Dept: SURGERY | Age: 36
End: 2021-10-07

## 2021-10-07 DIAGNOSIS — E66.9 OBESITY (BMI 30-39.9): Primary | ICD-10-CM

## 2021-10-09 NOTE — PROGRESS NOTES
[facility]  Metabolic Weight Loss Program        Patient's Name: Ambreen Johnson. : 1985    This patient is enrolled in [address] [place] [address] Weight Loss Program and attended the required weekly virtual nutrition class hosted via [employer] today.       Lamonte Adrian, MS, RD, LDN

## 2021-10-11 ENCOUNTER — CLINICAL SUPPORT (OUTPATIENT)
Dept: SURGERY | Age: 36
End: 2021-10-11

## 2021-10-11 VITALS
HEIGHT: 68 IN | HEART RATE: 96 BPM | SYSTOLIC BLOOD PRESSURE: 110 MMHG | BODY MASS INDEX: 35.9 KG/M2 | WEIGHT: 236.9 LBS | DIASTOLIC BLOOD PRESSURE: 66 MMHG | RESPIRATION RATE: 18 BRPM | OXYGEN SATURATION: 98 %

## 2021-10-11 DIAGNOSIS — E66.9 OBESITY (BMI 30-39.9): Primary | ICD-10-CM

## 2021-10-11 DIAGNOSIS — E78.00 HYPERCHOLESTEROLEMIA: ICD-10-CM

## 2021-10-11 DIAGNOSIS — G47.33 OSA (OBSTRUCTIVE SLEEP APNEA): ICD-10-CM

## 2021-10-11 NOTE — PROGRESS NOTES
1. Have you been to the ER, urgent care clinic since your last visit? Hospitalized since your last visit? No    2. Have you seen or consulted any other health care providers outside of the [address] [place] [address] since your last visit? Include any pap smears or colon screening. No    9/27/2021      Progress Note: Weekly Education Class in the [facility] Weight Loss Program         Patient is on Very Low Calorie Diet [] (4 meal replacements per day, 800 kcal/day)      Low Calorie Diet [x] (2-3 meal replacements per day, 6760-6990 kcal/day)    1) Did patient have any new symptoms or physical problems? Yes []    No [x]    If yes, check & comment: weakness [], fatigue [], lightheadedness [], headache [], cramps [], cold intolerance [], hair loss [], diarrhea [], constipation [],  NA [] other:                                 2) Has patient had any medical attention from other providers, urgent care or the emergency room this week? Yes []  No [x]       NA [], If yes, why:                                      3) Any other sugar sweetened beverages consumed this week? Yes []  No [x]    4) Did patient have any problems adhering to the diet? Yes []  No [x] NA []    If yes, Vacation [], Celebrations [], Conferences [], Family Reunions [] other:                                               5) How many hours of sleep this week? 6.5    (range)  NA []    Number of meal replacements consumed daily? 2 (range)  NA []    Average ounces of water patient consumed daily this week (not including shakes)? 80     (divide the weekly total by 7)    Did you eat any food outside of the program? Yes [x] No []    Physical Activity Over the Past Week:    Cardio exercise: 33 min  Strength exercise: 1 workouts / week  Number of steps walked per day: 0    How has patient mood overall been this week?  Sad [], Happy [], Stressed [], Tired [], Content [], NA [], other NOT ANSWERED           Medications reconciled by nurse Yes [x] No[]    Patient was given therapeutic recommendations for any noted side effects of their dietary approach based upon [facility] patient manual per providers recommendation. 10/4/2021      Progress Note: Weekly Education Class in the [facility] Weight Loss Program         Patient is on Very Low Calorie Diet [] (4 meal replacements per day, 800 kcal/day)      Low Calorie Diet [x] (2-3 meal replacements per day, 2535-7746 kcal/day)    1) Did patient have any new symptoms or physical problems? Yes []    No [x]    If yes, check & comment: weakness [], fatigue [], lightheadedness [], headache [], cramps [], cold intolerance [], hair loss [], diarrhea [], constipation [],  NA [] other:                                 2) Has patient had any medical attention from other providers, urgent care or the emergency room this week? Yes []  No [x]       NA [], If yes, why:                                      3) Any other sugar sweetened beverages consumed this week? Yes []  No [x]    4) Did patient have any problems adhering to the diet? Yes [x]  No [] NA []    If yes, Vacation [], Celebrations [], Conferences [], Family Reunions [] other:                                                5) How many hours of sleep this week? 6    (range)  NA []    Number of meal replacements consumed daily? 2 (range)  NA []    Average ounces of water patient consumed daily this week (not including shakes)? 80     (divide the weekly total by 7)    Did you eat any food outside of the program? Yes [x] No []    Physical Activity Over the Past Week:    Cardio exercise: 15 min  Strength exercise: 2 workouts / week  Number of steps walked per day: 0     How has patient mood overall been this week?  Sad [], Happy [], Stressed [], Tired [], Content [], NA [], other NOT ANSWERED           Medications reconciled by nurse Yes [x]  No[]    Patient was given therapeutic recommendations for any noted side effects of their dietary approach based upon [facility] patient manual per providers recommendation.

## 2021-10-14 ENCOUNTER — OFFICE VISIT (OUTPATIENT)
Dept: SURGERY | Age: 36
End: 2021-10-14

## 2021-10-14 DIAGNOSIS — E66.01 MORBID OBESITY (HCC): Primary | ICD-10-CM

## 2021-10-18 NOTE — PROGRESS NOTES
[facility]  Metabolic Weight Loss Program        Patient's Name: Arvel Dance. : 1985    This patient is enrolled in [address] [place] [address] Weight Loss Program and attended the required weekly virtual nutrition class hosted via [employer].       Ramonita Orr RD

## 2021-10-21 ENCOUNTER — OFFICE VISIT (OUTPATIENT)
Dept: SURGERY | Age: 36
End: 2021-10-21

## 2021-10-21 DIAGNOSIS — E66.9 OBESITY (BMI 30-39.9): Primary | ICD-10-CM

## 2021-10-25 ENCOUNTER — CLINICAL SUPPORT (OUTPATIENT)
Dept: SURGERY | Age: 36
End: 2021-10-25

## 2021-10-25 VITALS
HEIGHT: 68 IN | BODY MASS INDEX: 35.39 KG/M2 | OXYGEN SATURATION: 98 % | WEIGHT: 233.5 LBS | SYSTOLIC BLOOD PRESSURE: 96 MMHG | DIASTOLIC BLOOD PRESSURE: 62 MMHG | HEART RATE: 83 BPM | RESPIRATION RATE: 18 BRPM

## 2021-10-25 DIAGNOSIS — G47.33 OSA (OBSTRUCTIVE SLEEP APNEA): ICD-10-CM

## 2021-10-25 DIAGNOSIS — E78.00 HYPERCHOLESTEROLEMIA: ICD-10-CM

## 2021-10-25 DIAGNOSIS — E66.9 OBESITY (BMI 30-39.9): Primary | ICD-10-CM

## 2021-10-25 NOTE — PROGRESS NOTES
Weight Management. 1. Have you been to the ER, urgent care clinic since your last visit? Hospitalized since your last visit? No    2. Have you seen or consulted any other health care providers outside of the [address] [place] [address] since your last visit? Include any pap smears or colon screening. No     10/11/2021      Progress Note: Weekly Education Class in the [facility] Weight Loss Program         Patient is on Very Low Calorie Diet [] (4 meal replacements per day, 800 kcal/day)      Low Calorie Diet [x] (2-3 meal replacements per day, 2104-4226 kcal/day)    1) Did patient have any new symptoms or physical problems? Yes []    No [x]    If yes, check & comment: weakness [], fatigue [], lightheadedness [], headache [], cramps [], cold intolerance [], hair loss [], diarrhea [], constipation [],  NA [] other:                                 2) Has patient had any medical attention from other providers, urgent care or the emergency room this week? Yes []  No [x]       NA [], If yes, why:                                       3) Any other sugar sweetened beverages consumed this week? Yes []  No [x]    4) Did patient have any problems adhering to the diet? Yes []  No [x] NA []    If yes, Vacation [], Celebrations [], Conferences [], Family Reunions [] other:                                                5) How many hours of sleep this week? 6   (range)  NA []    Number of meal replacements consumed daily? 2 (range)  NA []    Average ounces of water patient consumed daily this week (not including shakes)? 80     (divide the weekly total by 7)    Did you eat any food outside of the program? Yes [x] No []    Physical Activity Over the Past Week:    Cardio exercise: 47 min  Strength exercise: 1 workouts / week  Number of steps walked per day: 0    How has patient mood overall been this week?  Sad [], Happy [], Stressed [], Tired [], Content [], NA [], other NOT ANSWERED           Medications reconciled by nurse Yes [x]  No[]    Patient was given therapeutic recommendations for any noted side effects of their dietary approach based upon [facility] patient manual per providers recommendation. 10/18/2021      Progress Note: Weekly Education Class in the [facility] Weight Loss Program         Patient is on Very Low Calorie Diet [] (4 meal replacements per day, 800 kcal/day)      Low Calorie Diet [x] (2-3 meal replacements per day, 4472-1946 kcal/day)    1) Did patient have any new symptoms or physical problems? Yes []    No [x]    If yes, check & comment: weakness [], fatigue [], lightheadedness [], headache [], cramps [], cold intolerance [], hair loss [], diarrhea [], constipation [],  NA [] other:                                 2) Has patient had any medical attention from other providers, urgent care or the emergency room this week? Yes []  No [x]       NA [], If yes, why:                                       3) Any other sugar sweetened beverages consumed this week? Yes []  No [x]    4) Did patient have any problems adhering to the diet? Yes []  No [x] NA []    If yes, Vacation [], Celebrations [], Conferences [], Family Reunions [] other:                                                5) How many hours of sleep this week? 6    (range)  NA []    Number of meal replacements consumed daily? 2 (range)  NA []    Average ounces of water patient consumed daily this week (not including shakes)? 80     (divide the weekly total by 7)    Did you eat any food outside of the program? Yes [x] No []    Physical Activity Over the Past Week:    Cardio exercise: 20 min  Strength exercise: 2 workouts / week  Number of steps walked per day: 0    How has patient mood overall been this week?  Sad [], Happy [], Stressed [], Tired [], Content [], NA [], other NOT ANSWERED           Medications reconciled by nurse Yes [x]  No[]    Patient was given therapeutic recommendations for any noted side effects of their dietary approach based upon [facility] patient manual per providers recommendation.

## 2021-10-26 NOTE — PROGRESS NOTES
[facility]  Metabolic Weight Loss Program        Patient's Name: Татьяна Garza. : 1985    This patient is enrolled in [address] [place] [address] Weight Loss Program and attended the required weekly virtual nutrition class hosted via [employer].       Mackenzie Morrison MS, RD, LDN

## 2021-10-28 ENCOUNTER — OFFICE VISIT (OUTPATIENT)
Dept: SURGERY | Age: 36
End: 2021-10-28

## 2021-10-28 DIAGNOSIS — E66.9 OBESITY (BMI 30-39.9): Primary | ICD-10-CM

## 2021-10-29 NOTE — PROGRESS NOTES
[facility]  Metabolic Weight Loss Program        Patient's Name: Liam Reed. : 1985    This patient is enrolled in [address] [place] [address] Weight Loss Program and attended the required weekly virtual nutrition class hosted via [employer] today.       Tami Freitas MS, RD, LDN

## 2021-11-01 NOTE — PROGRESS NOTES
Nurse note from patient's weekly VLCD / LCD / Maintenance class was reviewed. Pertinent medical concerns were:          BP Readings from Last 3 Encounters:   10/25/21 96/62   10/11/21 110/66   09/27/21 112/72   good progress    Weight Metrics 10/25/2021 10/11/2021 9/27/2021 9/13/2021 9/8/2021 8/27/2021 8/10/2021   Weight 233 lb 8 oz 236 lb 14.4 oz 239 lb 11.2 oz 241 lb 4.8 oz 243 lb 244 lb 8 oz -   Neck Circ (inches) - - - - - - 14.75   Waist Measure Inches - - - - - - 44.5   Body Fat % - - - - - - 32.9   BMI 35.5 kg/m2 36.02 kg/m2 36.45 kg/m2 36.69 kg/m2 36.95 kg/m2 38.29 kg/m2 -       Current Outpatient Medications   Medication Sig Dispense Refill    multivitamin (ONE A DAY) tablet Take 1 Tab by mouth daily.

## 2021-11-01 NOTE — PROGRESS NOTES
Nurse note from patient's weekly VLCD / LCD / Maintenance class was reviewed. Pertinent medical concerns were:   Down 3     BP Readings from Last 3 Encounters:   10/25/21 96/62   10/11/21 110/66   09/27/21 112/72       Weight Metrics 10/25/2021 10/11/2021 9/27/2021 9/13/2021 9/8/2021 8/27/2021 8/10/2021   Weight 233 lb 8 oz 236 lb 14.4 oz 239 lb 11.2 oz 241 lb 4.8 oz 243 lb 244 lb 8 oz -   Neck Circ (inches) - - - - - - 14.75   Waist Measure Inches - - - - - - 44.5   Body Fat % - - - - - - 32.9   BMI 35.5 kg/m2 36.02 kg/m2 36.45 kg/m2 36.69 kg/m2 36.95 kg/m2 38.29 kg/m2 -       Current Outpatient Medications   Medication Sig Dispense Refill    multivitamin (ONE A DAY) tablet Take 1 Tab by mouth daily.

## 2021-11-04 ENCOUNTER — OFFICE VISIT (OUTPATIENT)
Dept: SURGERY | Age: 36
End: 2021-11-04

## 2021-11-04 DIAGNOSIS — E66.9 OBESITY (BMI 30-39.9): Primary | ICD-10-CM

## 2021-11-08 ENCOUNTER — CLINICAL SUPPORT (OUTPATIENT)
Dept: SURGERY | Age: 36
End: 2021-11-08

## 2021-11-08 VITALS
HEART RATE: 78 BPM | SYSTOLIC BLOOD PRESSURE: 110 MMHG | BODY MASS INDEX: 35.33 KG/M2 | WEIGHT: 233.1 LBS | TEMPERATURE: 98 F | OXYGEN SATURATION: 98 % | RESPIRATION RATE: 18 BRPM | HEIGHT: 68 IN | DIASTOLIC BLOOD PRESSURE: 72 MMHG

## 2021-11-08 DIAGNOSIS — E66.9 OBESITY (BMI 30-39.9): Primary | ICD-10-CM

## 2021-11-08 NOTE — PROGRESS NOTES
1. Have you been to the ER, urgent care clinic since your last visit? Hospitalized since your last visit? No    2. Have you seen or consulted any other health care providers outside of the [address] [place] [address] since your last visit? Include any pap smears or colon screening. Yes When: 10/15/2021 Monthly visit this therapist.     Progress Note: Weekly Education Class in the [facility] Weight Loss Program         Patient is on Very Low Calorie Diet [] (4 meal replacements per day, 800 kcal/day)      Low Calorie Diet [x] (2-3 meal replacements per day, 8636-1172 kcal/day)    1) Did patient have any new symptoms or physical problems? Yes []    No [x]    If yes, check & comment: weakness [], fatigue [], lightheadedness [], headache [], cramps [], cold intolerance [], hair loss [], diarrhea [], constipation [],  NA [] other:                                2) Has patient had any medical attention from other providers, urgent care or the emergency room this week? Yes [x]  No []       NA [], If yes, why: Monthly visit with [facility]                                     3) Any other sugar sweetened beverages consumed this week? Yes []  No [x]    4) Did patient have any problems adhering to the diet? Yes []  No [] NA [x]    If yes, Vacation [], Celebrations [], Conferences [], Family Reunions [] other:                                                5) How many hours of sleep this week?   (range)  NA [x]    Number of meal replacements consumed daily? 2 (range)  NA []    Average ounces of water patient consumed daily this week (not including shakes)? 80     (divide the weekly total by 7)    Did you eat any food outside of the program? Yes [x] No []    Physical Activity Over the Past Week:    Cardio exercise: 35 min  Strength exercise: n/a workouts / week  Number of steps walked per day: n/a    How has patient mood overall been this week?  Sad [], Happy [], Stressed [], Tired [], Content [x], NA [], other Medications reconciled by nurse Yes [x]  No[]    Patient was given therapeutic recommendations for any noted side effects of their dietary approach based upon [facility] patient manual per providers recommendation. Progress Note: Weekly Education Class in the [facility] Weight Loss Program         Patient is on Very Low Calorie Diet [] (4 meal replacements per day, 800 kcal/day)      Low Calorie Diet [x] (2-3 meal replacements per day, 8540-8858 kcal/day)    1) Did patient have any new symptoms or physical problems? Yes []    No [x]    If yes, check & comment: weakness [], fatigue [], lightheadedness [], headache [], cramps [], cold intolerance [], hair loss [], diarrhea [], constipation [],  NA [] other:                                 2) Has patient had any medical attention from other providers, urgent care or the emergency room this week? Yes []  No [x]       NA [], If yes, why:                                      3) Any other sugar sweetened beverages consumed this week? Yes []  No [x]    4) Did patient have any problems adhering to the diet? Yes []  No [] NA [x]    If yes, Vacation [], Celebrations [], Conferences [], Family Reunions [] other:                                                5) How many hours of sleep this week? 5-7   (range)  NA []    Number of meal replacements consumed daily? 2 (range)  NA []    Average ounces of water patient consumed daily this week (not including shakes)? 80     (divide the weekly total by 7)    Did you eat any food outside of the program? Yes [x] No []    Physical Activity Over the Past Week:    Cardio exercise: n/a min  Strength exercise: n/a workouts / week  Number of steps walked per day: n/a    How has patient mood overall been this week?  Sad [], Happy [], Stressed [], Tired [], Content [x], NA [], other            Medications reconciled by nurse Yes [x]  No[]    Patient was given therapeutic recommendations for any noted side effects of their dietary approach based upon [facility] patient manual per providers recommendation.

## 2021-11-10 ENCOUNTER — VIRTUAL VISIT (OUTPATIENT)
Dept: SURGERY | Age: 36
End: 2021-11-10
Payer: COMMERCIAL

## 2021-11-10 DIAGNOSIS — E66.9 OBESITY, CLASS II, BMI 35-39.9, ISOLATED (SEE ACTUAL BMI): Primary | ICD-10-CM

## 2021-11-10 DIAGNOSIS — E78.00 HYPERCHOLESTEROLEMIA: ICD-10-CM

## 2021-11-10 DIAGNOSIS — E66.9 OBESITY (BMI 30-39.9): ICD-10-CM

## 2021-11-10 DIAGNOSIS — G47.33 OSA (OBSTRUCTIVE SLEEP APNEA): ICD-10-CM

## 2021-11-10 PROCEDURE — 99213 OFFICE O/P EST LOW 20 MIN: CPT | Performed by: FAMILY MEDICINE

## 2021-11-10 NOTE — PROGRESS NOTES
1 month follow up. Weight management, 1. Have you been to the ER, urgent care clinic since your last visit? Hospitalized since your last visit? No    2. Have you seen or consulted any other health care providers outside of the [address] [place] [address] since your last visit? Include any pap smears or colon screening.  No

## 2021-11-10 NOTE — PROGRESS NOTES
[facility] Progress Note:   F/up Physician Visit    Angela Leavitt is a 39 y.o. male who was seen by synchronous (real-time) audio-video technology on 11/10/2021. Consent:  He and/or his healthcare decision maker is aware that this patient-initiated Telehealth encounter is a billable service, with coverage as determined by his insurance carrier. He is aware that he may receive a bill and has provided verbal consent to proceed: Yes    I was at home while conducting this encounter. 712  Subjective:   Angela Waldron was seen for Weight Management    f/up physician visit for the VLCD / LCD Program.  Prior to Admission medications    Medication Sig Start Date End Date Taking? Authorizing Provider   multivitamins chew Take 2 Each by mouth daily. Vitafsion Mens Daily    Yes Provider, Historical     Allergies   Allergen Reactions    Doxycycline Other (comments)     Burning throat and ears       Patient Active Problem List    Diagnosis Date Noted    Chest pain 10/27/2017    Morbidly obese ([address] 10/27/2017     Current Outpatient Medications   Medication Sig Dispense Refill    multivitamins chew Take 2 Each by mouth daily.  Vitafsion Mens Daily          ROS      CC: Weight Management  Official start was VV at 320  Aug 2021 in office 250 lb  Now [facility] progress      Angela Leavitt is a 39 y.o. male who is here for his      Weight Metrics 11/8/2021 10/25/2021 10/11/2021 9/27/2021 9/13/2021 9/8/2021 8/27/2021   Weight 233 lb 1.6 oz 233 lb 8 oz 236 lb 14.4 oz 239 lb 11.2 oz 241 lb 4.8 oz 243 lb 244 lb 8 oz   Neck Circ (inches) - - - - - - -   Waist Measure Inches - - - - - - -   Body Fat % - - - - - - -   BMI 35.44 kg/m2 35.5 kg/m2 36.02 kg/m2 36.45 kg/m2 36.69 kg/m2 36.95 kg/m2 38.29 kg/m2         Outpatient Medications Marked as Taking for the 11/10/21 encounter (Virtual Visit) with Pete Velez MD   Medication Sig Dispense Refill    multivitamins chew Take 2 Each by mouth daily. Vitafsion Mens Daily            Participation   Did you attend clinic and class last week? yes    Review of Systems  Since your last visit, have you experienced any complications? no  If yes, please list:     Are you taking an appetite suppressant? no  If so, is there any Chest Pain, Palpitations or Dizziness? HUNGER CONTROL: yes    BP Readings from Last 3 Encounters:   11/08/21 110/72   10/25/21 96/62   10/11/21 110/66       SLEEP:  6-7 on cpap    Have you received any other medical care this week? no  If yes, where and for what? Have you discontinued or changed any medicine or dose of your medicine since your last visit with Dr Anthony Esteban? no  If yes, where and for what? Diet  How many ounces of calorie-free liquids did you consume each day?  80 oz    How many meal replacements did you take each day? 2-3 and a meal    Did you have any problems adhering to the program?  no If yes, please explain:      Exercise  Aerobic exercise: HIIT 15 30 cardio and 15-30 min weights min  Resistance exercise: see above workouts / week  Any discomfort? If yes, where? Objective  There were no vitals taken for this visit. No LMP for male patient. PHYSICAL EXAMINATION:  [ INSTRUCTIONS:  \"[x]\" Indicates a positive item  \"[]\" Indicates a negative item  -- DELETE ALL ITEMS NOT EXAMINED]  Vital Signs: (As obtained by patient/caregiver at home)  There were no vitals taken for this visit.      Constitutional: [x] Appears well-developed and well-nourished [x] No apparent distress      [] Abnormal -     Mental status: [x] Alert and awake  [x] Oriented to person/place/time [x] Able to follow commands    [] Abnormal -     Eyes:   EOM    [x]  Normal    [] Abnormal -   Sclera  [x]  Normal    [] Abnormal -          Discharge [x]  None visible   [] Abnormal -     HENT: [x] Normocephalic, atraumatic  [] Abnormal -   [x] Mouth/Throat: Mucous membranes are moist    External Ears [x] Normal  [] Abnormal -    Neck: [x] No visualized mass [] Abnormal -     Pulmonary/Chest: [x] Respiratory effort normal   [x] No visualized signs of difficulty breathing or respiratory distress        [] Abnormal -      Musculoskeletal:   [x] Normal gait with no signs of ataxia         [x] Normal range of motion of neck        [] Abnormal -     Neurological:        [x] No Facial Asymmetry (Cranial nerve 7 motor function) (limited exam due to video visit)          [x] No gaze palsy        [] Abnormal -          Skin:        [x] No significant exanthematous lesions or discoloration noted on facial skin         [] Abnormal -            Psychiatric:       [x] Normal Affect [] Abnormal -        [x] No Hallucinations    Other pertinent observable physical exam findings:-      Assessment / Plan    Encounter Diagnoses   Name Primary?  Obesity, Class II, BMI 35-39.9, isolated (see actual BMI) Yes    Obesity (BMI 30-39. 9)     GUDELIA (obstructive sleep apnea)     Hypercholesterolemia      Diagnoses and all orders for this visit:    1. Obesity, Class II, BMI 35-39.9, isolated (see actual BMI)  Needs labs and needs an in office visit when I return  I explained he has acess to other doc as well as np while I am away a few weeks  On no appetite meds and is doing great    2. Obesity (BMI 64-40.9)  -     METABOLIC PANEL, COMPREHENSIVE; Future    3. GUDELIA (obstructive sleep apnea)  Cont cpap  4. Hypercholesterolemia  -     METABOLIC PANEL, COMPREHENSIVE; Future  -     LIPID PANEL; Future  Check current level, the process of change in det and the exercise is likely to normalize the LDL and trig    1. Weight management much improved   Progress was reviewed with patient    2.   Labs    Latest results reviewed with patient          face to face time with Lisandra Machado consisted of counseling & coordinating and/or discussing treatment plans in reference to his obesity The primary encounter diagnosis was Obesity, Class II, BMI 35-39.9, isolated (see actual BMI). Diagnoses of Obesity (BMI 30-39.9), GUDELIA (obstructive sleep apnea), and Hypercholesterolemia were also pertinent to this visit. Coding Help - Use CPT Codes 42505-71717, 52568-22266 for Established and New Patients respectively, either employing EM elements or Time rules. Other codes (example consult codes) may also apply. We discussed the expected course, resolution and complications of the diagnosis(es) in detail. Medication risks, benefits, costs, interactions, and alternatives were discussed as indicated. I advised him to contact the office if his condition worsens, changes or fails to improve as anticipated. He expressed understanding with the diagnosis(es) and plan. Pursuant to the emergency declaration under the [facility], [facility] waiver authority and the [employer] General Act, this Virtual  Visit was conducted, with patient's consent, to reduce the patient's risk of exposure to COVID-19 and provide continuity of care for an established patient. Services were provided through a video synchronous discussion virtually to substitute for in-person clinic visit.     Filippo Delacruz MD

## 2021-11-11 ENCOUNTER — OFFICE VISIT (OUTPATIENT)
Dept: SURGERY | Age: 36
End: 2021-11-11

## 2021-11-11 DIAGNOSIS — E66.01 MORBID OBESITY (HCC): Primary | ICD-10-CM

## 2021-11-12 NOTE — PROGRESS NOTES
[facility]  Metabolic Weight Loss Program        Patient's Name: Angela Waldron. : 1985    This patient is enrolled in [address] [place] [address] Weight Loss Program and attended the required weekly virtual nutrition class hosted via [address] [place] [address] today.       Hany Hernandez RD

## 2021-11-18 ENCOUNTER — OFFICE VISIT (OUTPATIENT)
Dept: SURGERY | Age: 36
End: 2021-11-18

## 2021-11-18 DIAGNOSIS — E66.01 MORBID OBESITY (HCC): Primary | ICD-10-CM

## 2021-11-22 ENCOUNTER — CLINICAL SUPPORT (OUTPATIENT)
Dept: SURGERY | Age: 36
End: 2021-11-22

## 2021-11-22 VITALS
OXYGEN SATURATION: 99 % | SYSTOLIC BLOOD PRESSURE: 100 MMHG | WEIGHT: 229 LBS | RESPIRATION RATE: 18 BRPM | DIASTOLIC BLOOD PRESSURE: 62 MMHG | HEIGHT: 68 IN | HEART RATE: 81 BPM | BODY MASS INDEX: 34.71 KG/M2

## 2021-11-22 DIAGNOSIS — E66.9 OBESITY (BMI 30-39.9): Primary | ICD-10-CM

## 2021-11-22 NOTE — PROGRESS NOTES
Weight Management. 1. Have you been to the ER, urgent care clinic since your last visit? Hospitalized since your last visit? No    2. Have you seen or consulted any other health care providers outside of the [address] [place] [address] since your last visit? Include any pap smears or colon screening. No     11/8/2021    Progress Note: Weekly Education Class in the [facility] Weight Loss Program         Patient is on Very Low Calorie Diet [] (4 meal replacements per day, 800 kcal/day)      Low Calorie Diet [x] (2-3 meal replacements per day, 3069-3280 kcal/day)    1) Did patient have any new symptoms or physical problems? Yes []    No [x]    If yes, check & comment: weakness [], fatigue [], lightheadedness [], headache [], cramps [], cold intolerance [], hair loss [], diarrhea [], constipation [],  NA [] other:                                 2) Has patient had any medical attention from other providers, urgent care or the emergency room this week? Yes []  No [x]       NA [], If yes, why:                                       3) Any other sugar sweetened beverages consumed this week? Yes []  No [x]    4) Did patient have any problems adhering to the diet? Yes []  No [x] NA []    If yes, Vacation [], Celebrations [], Conferences [], Family Reunions [] other:                                               5) How many hours of sleep this week? 6-8    (range)  NA []    Number of meal replacements consumed daily? 2 (range)  NA []    Average ounces of water patient consumed daily this week (not including shakes)? 80     (divide the weekly total by 7)    Did you eat any food outside of the program? Yes [x] No []    Physical Activity Over the Past Week:    Cardio exercise: 36 min  Strength exercise: 0 workouts / week  Number of steps walked per day: 0    How has patient mood overall been this week?  Sad [], Happy [], Stressed [], Tired [], Content [], NA [], other   NOT ANSWERED           Medications reconciled by nurse Yes [x]  No[]    Patient was given therapeutic recommendations for any noted side effects of their dietary approach based upon [facility] patient manual per providers recommendation. 11/15/2021    Progress Note: Weekly Education Class in the [facility] Weight Loss Program         Patient is on Very Low Calorie Diet [] (4 meal replacements per day, 800 kcal/day)      Low Calorie Diet [x] (2-3 meal replacements per day, 4438-6660 kcal/day)    1) Did patient have any new symptoms or physical problems? Yes []    No [x]    If yes, check & comment: weakness [], fatigue [], lightheadedness [], headache [], cramps [], cold intolerance [], hair loss [], diarrhea [], constipation [],  NA [] other:                                 2) Has patient had any medical attention from other providers, urgent care or the emergency room this week? Yes []  No [x]       NA [], If yes, why:                                      3) Any other sugar sweetened beverages consumed this week? Yes []  No [x]    4) Did patient have any problems adhering to the diet? Yes []  No [x] NA []    If yes, Vacation [], Celebrations [], Conferences [], Family Reunions [] other:                                                5) How many hours of sleep this week? 4-8    (range)  NA []    Number of meal replacements consumed daily? 2 (range)  NA []    Average ounces of water patient consumed daily this week (not including shakes)? 80     (divide the weekly total by 7)    Did you eat any food outside of the program? Yes [x] No []    Physical Activity Over the Past Week:    Cardio exercise:  min  Strength exercise: 0 workouts / week  Number of steps walked per day: 0    How has patient mood overall been this week?  Sad [], Happy [], Stressed [], Tired [], Content [], NA [], other   NOT ANSWERED           Medications reconciled by nurse Yes [x]  No[]    Patient was given therapeutic recommendations for any noted side effects of their dietary approach based upon [facility] [employer] patient manual per providers recommendation.

## 2021-11-23 NOTE — PROGRESS NOTES
[facility]  Metabolic Weight Loss Program        Patient's Name: Heaven Rincon. : 1985    This patient is enrolled in [address] [place] [address] Weight Loss Program and attended the required weekly virtual nutrition class hosted via [address] [place] [address] today.       Hortencia Branch MS, RD, LDN

## 2021-11-29 NOTE — PROGRESS NOTES
[facility]  Metabolic Weight Loss Program        Patient's Name: Sachi Villa. : 1985    This patient is enrolled in [address] [place] [address] Weight Loss Program and attended the required weekly virtual nutrition class hosted via [employer].       Roro Ahmdai, MS, RD, LDN

## 2021-12-02 ENCOUNTER — OFFICE VISIT (OUTPATIENT)
Dept: SURGERY | Age: 36
End: 2021-12-02

## 2021-12-02 DIAGNOSIS — E66.9 OBESITY (BMI 30-39.9): Primary | ICD-10-CM

## 2021-12-02 NOTE — PROGRESS NOTES
Tadeo Ponce. presents for weekly or bi-monthly evaluation of Obesity Body mass index is 34.82 kg/m². Visit Vitals  /62 (BP 1 Location: Right arm, BP Patient Position: Sitting, BP Cuff Size: Adult long)   Pulse 81   Resp 18   Ht 5' 8\" (1.727 m)   Wt 229 lb (103.9 kg)   SpO2 99%   BMI 34.82 kg/m²       Wt Readings from Last 3 Encounters:   11/22/21 229 lb (103.9 kg)   11/08/21 233 lb 1.6 oz (105.7 kg)   10/25/21 233 lb 8 oz (105.9 kg)       1. Obesity (BMI 30-39. 9)         I have reviewed and agree with nurse documentation of Weekly Education Class nurse progress note for [employer] Weight [address] [place] [address] [place]). Tadeo Ponce. is compliant with program requirements and may continue participation utilizing the [employer] meal replacements, as discussed. Patient has been advised to refer to the [facility] Patient Manual and notify us if any side effects to this meal plan are present and/or persist.  Tadeo Ponce. may continue the current dietary approach, care and follow up at the monthly office visit with the provider, as scheduled. Follow-up and Dispositions    · Return in about 2 weeks (around 12/6/2021). Documentation true and accepted by Ned Mclean.  Shayna Alvarez., AOBFP, Diplomate ENEDINA SANCHEZ

## 2021-12-02 NOTE — PROGRESS NOTES
Robin Schultzroel presents for weekly or bi-monthly evaluation of Obesity Body mass index is 35.44 kg/m². Visit Vitals  /72 (BP 1 Location: Left arm)   Pulse 78   Temp 98 °F (36.7 °C)   Resp 18   Ht 5' 8\" (1.727 m)   Wt 233 lb 1.6 oz (105.7 kg)   SpO2 98%   BMI 35.44 kg/m²       Wt Readings from Last 3 Encounters:   11/22/21 229 lb (103.9 kg)   11/08/21 233 lb 1.6 oz (105.7 kg)   10/25/21 233 lb 8 oz (105.9 kg)       1. Obesity (BMI 30-39. 9)         I have reviewed and agree with nurse documentation of Weekly Education Class nurse progress note for [employer] Weight [address] [place] [address] [place]). Robin Schultzroel is compliant with program requirements and may continue participation utilizing the [employer] meal replacements, as discussed. Patient has been advised to refer to the [facility] Patient Manual and notify us if any side effects to this meal plan are present and/or persist.  Robin Kaplan may continue the current dietary approach, care and follow up at the monthly office visit with the provider, as scheduled. Follow-up and Dispositions    · Return in about 2 weeks (around 11/22/2021) for weight check. Documentation true and accepted by Xena Overton.  Keyur Johns., AOBFP, Diplomate EN[employer] SANCHEZ

## 2021-12-06 ENCOUNTER — CLINICAL SUPPORT (OUTPATIENT)
Dept: SURGERY | Age: 36
End: 2021-12-06

## 2021-12-06 VITALS
BODY MASS INDEX: 34.69 KG/M2 | HEART RATE: 72 BPM | TEMPERATURE: 98.3 F | HEIGHT: 68 IN | WEIGHT: 228.9 LBS | OXYGEN SATURATION: 98 % | RESPIRATION RATE: 18 BRPM | DIASTOLIC BLOOD PRESSURE: 60 MMHG | SYSTOLIC BLOOD PRESSURE: 110 MMHG

## 2021-12-06 DIAGNOSIS — E66.9 OBESITY (BMI 30-39.9): Primary | ICD-10-CM

## 2021-12-06 NOTE — PROGRESS NOTES
1. Have you been to the ER, urgent care clinic since your last visit? Hospitalized since your last visit? No    2. Have you seen or consulted any other health care providers outside of the [address] [place] [address] since your last visit? Include any pap smears or colon screening. No         Progress Note: Weekly Education Class in the [facility] Weight Loss Program         Patient is on Very Low Calorie Diet [] (4 meal replacements per day, 800 kcal/day)      Low Calorie Diet [x] (2-3 meal replacements per day, 4753-7478 kcal/day)    1) Did patient have any new symptoms or physical problems? Yes []    No [x]    If yes, check & comment: weakness [], fatigue [], lightheadedness [], headache [], cramps [], cold intolerance [], hair loss [], diarrhea [], constipation [],  NA [] other:                                2) Has patient had any medical attention from other providers, urgent care or the emergency room this week? Yes []  No [x]       NA [], If yes, why:                                       3) Any other sugar sweetened beverages consumed this week? Yes []  No [x]    4) Did patient have any problems adhering to the diet? Yes []  No [] NA [x]    If yes, Vacation [], Celebrations [], Conferences [], Family Reunions [] other:                                               5) How many hours of sleep this week? 7    (range)  NA []    Number of meal replacements consumed daily? 2 (range)  NA []    Average ounces of water patient consumed daily this week (not including shakes)? 77    (divide the weekly total by 7)    Did you eat any food outside of the program? Yes [x] No []    Physical Activity Over the Past Week:    Cardio exercise: 15 min  Strength exercise: 2 workouts / week  Number of steps walked per day: n/a    How has patient mood overall been this week?  Sad [], Happy [], Stressed [], Tired [], Content [], NA [x], other            Medications reconciled by nurse Yes [x]  No[]    Patient was given therapeutic recommendations for any noted side effects of their dietary approach based upon [facility] patient manual per providers recommendation. Progress Note: Weekly Education Class in the [facility] Weight Loss Program         Patient is on Very Low Calorie Diet [] (4 meal replacements per day, 800 kcal/day)      Low Calorie Diet [x] (2-3 meal replacements per day, 2431-5337 kcal/day)    1) Did patient have any new symptoms or physical problems? Yes []    No [x]    If yes, check & comment: weakness [], fatigue [], lightheadedness [], headache [], cramps [], cold intolerance [], hair loss [], diarrhea [], constipation [],  NA [] other:                                2) Has patient had any medical attention from other providers, urgent care or the emergency room this week? Yes []  No [x]       NA [], If yes, why:                                      3) Any other sugar sweetened beverages consumed this week? Yes [x]  No []    4) Did patient have any problems adhering to the diet? Yes []  No [x] NA []    If yes, Vacation [], Celebrations [], Conferences [], Family Reunions [] other:                                                5) How many hours of sleep this week? 7    (range)  NA []    Number of meal replacements consumed daily? 2 (range)  NA []    Average ounces of water patient consumed daily this week (not including shakes)? 80   (divide the weekly total by 7)    Did you eat any food outside of the program? Yes [x] No []    Physical Activity Over the Past Week:    Cardio exercise: 44 min  Strength exercise: n/a workouts / week  Number of steps walked per day: n/a    How has patient mood overall been this week?  Sad [], Happy [], Stressed [], Tired [], Content [], NA [x], other            Medications reconciled by nurse Yes [x]  No[]    Patient was given therapeutic recommendations for any noted side effects of their dietary approach based upon [facility] patient manual per providers recommendation.

## 2021-12-07 NOTE — PROGRESS NOTES
[employer] [facility]  Metabolic Weight Loss Program        Patient's Name: Abdullahi Ruiz. : 1985    This patient is enrolled in [address] [place] [address] Weight Loss Program and attended the required weekly virtual nutrition class hosted via [address] [place] [address] today.       Sena Cisneros MS, RD, LDN

## 2021-12-09 ENCOUNTER — OFFICE VISIT (OUTPATIENT)
Dept: SURGERY | Age: 36
End: 2021-12-09

## 2021-12-09 DIAGNOSIS — E66.9 OBESITY (BMI 30-39.9): Primary | ICD-10-CM

## 2021-12-10 NOTE — PROGRESS NOTES
[facility]  Metabolic Weight Loss Program        Patient's Name: James Rivas. : 1985    This patient is enrolled in [address] [place] [address] Weight Loss Program and attended the required weekly virtual nutrition class hosted via [employer] today.       Chely Arteaga, MS, RD, LDN

## 2021-12-16 ENCOUNTER — OFFICE VISIT (OUTPATIENT)
Dept: SURGERY | Age: 36
End: 2021-12-16

## 2021-12-16 DIAGNOSIS — E66.9 OBESITY (BMI 30-39.9): Primary | ICD-10-CM

## 2021-12-17 NOTE — PROGRESS NOTES
[facility] Management Center  Metabolic Weight Loss Program        Patient's Name: Liam Reed. : 1985    This patient is enrolled in [address] [place] [address] Weight Loss Program and attended the required weekly virtual nutrition class hosted via [employer] today.       Tami Freitas MS, RD, LDN

## 2021-12-20 ENCOUNTER — CLINICAL SUPPORT (OUTPATIENT)
Dept: SURGERY | Age: 36
End: 2021-12-20

## 2021-12-20 VITALS
RESPIRATION RATE: 18 BRPM | BODY MASS INDEX: 34.24 KG/M2 | SYSTOLIC BLOOD PRESSURE: 96 MMHG | WEIGHT: 225.9 LBS | HEIGHT: 68 IN | OXYGEN SATURATION: 99 % | HEART RATE: 74 BPM | DIASTOLIC BLOOD PRESSURE: 62 MMHG

## 2021-12-20 DIAGNOSIS — E66.9 OBESITY (BMI 30-39.9): Primary | ICD-10-CM

## 2021-12-20 NOTE — PROGRESS NOTES
Weight Management      1. Have you been to the ER, urgent care clinic since your last visit? Hospitalized since your last visit? No    2. Have you seen or consulted any other health care providers outside of the [address] [place] [address] since your last visit? Include any pap smears or colon screening. No     12/6/2021    Progress Note: Weekly Education Class in the [facility] Weight Loss Program         Patient is on Very Low Calorie Diet [] (4 meal replacements per day, 800 kcal/day)      Low Calorie Diet [x] (2-3 meal replacements per day, 6105-8422 kcal/day)    1) Did patient have any new symptoms or physical problems? Yes []    No [x]    If yes, check & comment: weakness [], fatigue [], lightheadedness [], headache [], cramps [], cold intolerance [], hair loss [], diarrhea [], constipation [],  NA [] other:                                 2) Has patient had any medical attention from other providers, urgent care or the emergency room this week? Yes []  No [x]       NA [], If yes, why:                                       3) Any other sugar sweetened beverages consumed this week? Yes []  No [x]    4) Did patient have any problems adhering to the diet? Yes []  No [x] NA []    If yes, Vacation [], Celebrations [], Conferences [], Family Reunions [] other:                                                5) How many hours of sleep this week? 6-8    (range)  NA []    Number of meal replacements consumed daily? 2 (range)  NA []    Average ounces of water patient consumed daily this week (not including shakes)? 80     (divide the weekly total by 7)    Did you eat any food outside of the program? Yes [x] No []    Physical Activity Over the Past Week:    Cardio exercise: 0 min  Strength exercise: 3 workouts / week  Number of steps walked per day: 0    How has patient mood overall been this week?  Sad [], Happy [], Stressed [], Tired [], Content [], NA [], other   NOT ANSWERED           Medications reconciled by nurse Yes [x]  No[]    Patient was given therapeutic recommendations for any noted side effects of their dietary approach based upon [facility] patient manual per providers recommendation. 12/13/2021    Progress Note: Weekly Education Class in the [facility] Weight Loss Program         Patient is on Very Low Calorie Diet [] (4 meal replacements per day, 800 kcal/day)      Low Calorie Diet [x] (2-3 meal replacements per day, 1813-8350 kcal/day)    1) Did patient have any new symptoms or physical problems? Yes []    No [x]    If yes, check & comment: weakness [], fatigue [], lightheadedness [], headache [], cramps [], cold intolerance [], hair loss [], diarrhea [], constipation [],  NA [] other:                                 2) Has patient had any medical attention from other providers, urgent care or the emergency room this week? Yes []  No [x]       NA [], If yes, why:                                       3) Any other sugar sweetened beverages consumed this week? Yes []  No [x]    4) Did patient have any problems adhering to the diet? Yes []  No [x] NA []    If yes, Vacation [], Celebrations [], Conferences [], Family Reunions [] other:                                                5) How many hours of sleep this week? 4-7    (range)  NA []    Number of meal replacements consumed daily? 2 (range)  NA []    Average ounces of water patient consumed daily this week (not including shakes)? 80     (divide the weekly total by 7)    Did you eat any food outside of the program? Yes [x] No []    Physical Activity Over the Past Week:    Cardio exercise: 15 min  Strength exercise: 2 workouts / week  Number of steps walked per day: 0    How has patient mood overall been this week?  Sad [], Happy [], Stressed [], Tired [], Content [], NA [], other   NOT ANSWERED           Medications reconciled by nurse Yes [x]  No[]    Patient was given therapeutic recommendations for any noted side effects of their dietary approach based upon [employer] patient manual per providers recommendation.

## 2021-12-30 ENCOUNTER — OFFICE VISIT (OUTPATIENT)
Dept: SURGERY | Age: 36
End: 2021-12-30

## 2021-12-30 DIAGNOSIS — E66.9 OBESITY (BMI 30-39.9): Primary | ICD-10-CM

## 2021-12-30 NOTE — PROGRESS NOTES
[facility] Management Center  Metabolic Weight Loss Program        Patient's Name: Ronni Urbano. : 1985    This patient is enrolled in [address] [place] [address] Weight Loss Program and attended the required weekly virtual nutrition class hosted via [employer].       Wesley De Leon, MS, RD, LDN

## 2022-01-06 ENCOUNTER — OFFICE VISIT (OUTPATIENT)
Dept: SURGERY | Age: 37
End: 2022-01-06

## 2022-01-06 DIAGNOSIS — E66.9 OBESITY (BMI 30-39.9): Primary | ICD-10-CM

## 2022-01-13 ENCOUNTER — OFFICE VISIT (OUTPATIENT)
Dept: SURGERY | Age: 37
End: 2022-01-13

## 2022-01-13 DIAGNOSIS — E66.9 OBESITY (BMI 30-39.9): Primary | ICD-10-CM

## 2022-01-14 NOTE — PROGRESS NOTES
[facility] Management Center  Metabolic Weight Loss Program        Patient's Name: Christina Harrington. : 1985    This patient is enrolled in [address] [place] [address] Weight Loss Program and attended the required weekly virtual nutrition class hosted via [address] [place] [address] today.       Gucci Ross, MS, RD, LDN

## 2022-01-15 NOTE — PROGRESS NOTES
[facility] Weight Management Center  Metabolic Weight Loss Program        Patient's Name: Shashank Kincaid. : 1985    This patient is enrolled in [address] [place] [address] Weight Loss Program and attended the required weekly virtual nutrition class hosted via [address] [place] [address] today.       Ira Mathias, MS, RD, LDN

## 2022-01-17 ENCOUNTER — CLINICAL SUPPORT (OUTPATIENT)
Dept: SURGERY | Age: 37
End: 2022-01-17

## 2022-01-17 VITALS
HEIGHT: 68 IN | WEIGHT: 222.8 LBS | TEMPERATURE: 98.3 F | HEART RATE: 66 BPM | BODY MASS INDEX: 33.77 KG/M2 | OXYGEN SATURATION: 99 % | SYSTOLIC BLOOD PRESSURE: 104 MMHG | DIASTOLIC BLOOD PRESSURE: 68 MMHG | RESPIRATION RATE: 18 BRPM

## 2022-01-17 DIAGNOSIS — G47.33 OSA (OBSTRUCTIVE SLEEP APNEA): ICD-10-CM

## 2022-01-17 DIAGNOSIS — E66.9 OBESITY (BMI 30-39.9): Primary | ICD-10-CM

## 2022-01-17 DIAGNOSIS — E78.00 HYPERCHOLESTEROLEMIA: ICD-10-CM

## 2022-01-17 NOTE — PROGRESS NOTES
1. Have you been to the ER, urgent care clinic since your last visit? Hospitalized since your last visit? No    2. Have you seen or consulted any other health care providers outside of the [address] [place] [address] since your last visit? Include any pap smears or colon screening. No       Progress Note: Weekly Education Class in the [facility] Weight Loss Program         Patient is on Very Low Calorie Diet [] (4 meal replacements per day, 800 kcal/day)      Low Calorie Diet [x] (2-3 meal replacements per day, 8407-5017 kcal/day)    1) Did patient have any new symptoms or physical problems? Yes []    No [x]    If yes, check & comment: weakness [], fatigue [], lightheadedness [], headache [], cramps [], cold intolerance [], hair loss [], diarrhea [], constipation [],  NA [] other:                                 2) Has patient had any medical attention from other providers, urgent care or the emergency room this week? Yes []  No [x]       NA [], If yes, why:                                      3) Any other sugar sweetened beverages consumed this week? Yes []  No [x]    4) Did patient have any problems adhering to the diet? Yes []  No [x] NA []    If yes, Vacation [], Celebrations [], Conferences [], Family Reunions [] other:                                               5) How many hours of sleep this week? 6-8   (range)  NA []    Number of meal replacements consumed daily? 2 (range)  NA []    Average ounces of water patient consumed daily this week (not including shakes)? 80  (divide the weekly total by 7)    Did you eat any food outside of the program? Yes [x] No []    Physical Activity Over the Past Week:    Cardio exercise: N/A min  Strength exercise: 3 workouts / week  Number of steps walked per day: N/A    How has patient mood overall been this week?  Sad [], Happy [], Stressed [], Tired [], Content [], NA [x], other            Medications reconciled by nurse Yes [x]  No[]    Patient was given therapeutic recommendations for any noted side effects of their dietary approach based upon [facility] patient manual per providers recommendation. Progress Note: Weekly Education Class in the [facility] Weight Loss Program         Patient is on Very Low Calorie Diet [] (4 meal replacements per day, 800 kcal/day)      Low Calorie Diet [x] (2-3 meal replacements per day, 7289-0119 kcal/day)    1) Did patient have any new symptoms or physical problems? Yes []    No [x]    If yes, check & comment: weakness [], fatigue [], lightheadedness [], headache [], cramps [], cold intolerance [], hair loss [], diarrhea [], constipation [],  NA [] other:                                 2) Has patient had any medical attention from other providers, urgent care or the emergency room this week? Yes []  No [x]       NA [], If yes, why:                                      3) Any other sugar sweetened beverages consumed this week? Yes []  No [x]    4) Did patient have any problems adhering to the diet? Yes []  No [x] NA []    If yes, Vacation [], Celebrations [], Conferences [], Family Reunions [] other:                                               5) How many hours of sleep this week? 5-8   (range)  NA []    Number of meal replacements consumed daily? 2 (range)  NA []    Average ounces of water patient consumed daily this week (not including shakes)? 80  (divide the weekly total by 7)    Did you eat any food outside of the program? Yes [x] No []    Physical Activity Over the Past Week:    Cardio exercise: N/A min  Strength exercise: 4 workouts / week  Number of steps walked per day: N/A    How has patient mood overall been this week?  Sad [], Happy [], Stressed [], Tired [], Content [], NA [x], other            Medications reconciled by nurse Yes [x]  No[]    Patient was given therapeutic recommendations for any noted side effects of their dietary approach based upon [facility] patient manual per providers recommendation.

## 2022-01-19 ENCOUNTER — VIRTUAL VISIT (OUTPATIENT)
Dept: SURGERY | Age: 37
End: 2022-01-19
Payer: COMMERCIAL

## 2022-01-19 DIAGNOSIS — E66.9 OBESITY (BMI 30-39.9): Primary | ICD-10-CM

## 2022-01-19 DIAGNOSIS — E66.9 OBESITY, CLASS II, BMI 35-39.9, ISOLATED (SEE ACTUAL BMI): ICD-10-CM

## 2022-01-19 DIAGNOSIS — E78.00 HYPERCHOLESTEROLEMIA: ICD-10-CM

## 2022-01-19 DIAGNOSIS — G47.33 OSA (OBSTRUCTIVE SLEEP APNEA): ICD-10-CM

## 2022-01-19 PROCEDURE — 99213 OFFICE O/P EST LOW 20 MIN: CPT | Performed by: FAMILY MEDICINE

## 2022-01-19 NOTE — PROGRESS NOTES
[facility] Progress Note:   F/up Physician Visit    Humberto Solorio is a 39 y.o. male who was seen by synchronous (real-time) audio-video technology on 1/19/2022. Consent:  He and/or his healthcare decision maker is aware that this patient-initiated Telehealth encounter is a billable service, with coverage as determined by his insurance carrier. He is aware that he may receive a bill and has provided verbal consent to proceed: Yes    I was at home while conducting this encounter. 712  Subjective:   Humberto Pacheco was seen for Weight Management    f/up physician visit for the / LCD Program.  Prior to Admission medications    Medication Sig Start Date End Date Taking? Authorizing Provider   multivitamins chew Take 2 Each by mouth daily. Vitafsion Mens Daily    Yes Provider, Historical     Allergies   Allergen Reactions    Doxycycline Other (comments)     Burning throat and ears       Patient Active Problem List    Diagnosis Date Noted    Chest pain 10/27/2017    Morbidly obese ([address] 10/27/2017     Current Outpatient Medications   Medication Sig Dispense Refill    multivitamins chew Take 2 Each by mouth daily. Vitafsion Mens Daily          ROS      CC: Weight Management  Now 222 lbs  Start [address]  Isa Sy is a 39 y.o. male who is here for his      Weight Metrics 1/17/2022 12/20/2021 12/6/2021 11/22/2021 11/8/2021 10/25/2021 10/11/2021   Weight 222 lb 12.8 oz 225 lb 14.4 oz 228 lb 14.4 oz 229 lb 233 lb 1.6 oz 233 lb 8 oz 236 lb 14.4 oz   Neck Circ (inches) - - - - - - -   Waist Measure Inches - - - - - - -   Body Fat % - - - - - - -   BMI 33.88 kg/m2 34.35 kg/m2 34.8 kg/m2 34.82 kg/m2 35.44 kg/m2 35.5 kg/m2 36.02 kg/m2         Outpatient Medications Marked as Taking for the 1/19/22 encounter (Virtual Visit) with Susannah Paez MD   Medication Sig Dispense Refill    multivitamins chew Take 2 Each by mouth daily.  Vitafsion Mens Daily            Participation Did you attend clinic and class last week? no    Review of Systems  Since your last visit, have you experienced any complications? no  If yes, please list:       Are you taking an appetite suppressant? no  If so, is there any Chest Pain, Palpitations or Dizziness? HUNGER CONTROL: good    BP Readings from Last 3 Encounters:   01/17/22 104/68   12/20/21 96/62   12/06/21 110/60       SLEEP:  6-7 on cpap    Have you received any other medical care this week? no  If yes, where and for what? Have you discontinued or changed any medicine or dose of your medicine since your last visit with Dr Mekhi Neves? no  If yes, where and for what? Diet  How many ounces of calorie-free liquids did you consume each day?  80 oz    How many meal replacements did you take each day? 2-3 and a meal    Did you have any problems adhering to the program?  no If yes, please explain:      Exercise  Aerobic exercise: calesthinics at home 3-4 days for 25-40  min  Resistance exercise: 3-4 days workouts / week  Any discomfort?  no     If yes, where? Objective  There were no vitals taken for this visit. No LMP for male patient. PHYSICAL EXAMINATION:  [ INSTRUCTIONS:  \"[x]\" Indicates a positive item  \"[]\" Indicates a negative item  -- DELETE ALL ITEMS NOT EXAMINED]  Vital Signs: (As obtained by patient/caregiver at home)  There were no vitals taken for this visit.      Constitutional: [x] Appears well-developed and well-nourished [x] No apparent distress      [] Abnormal -     Mental status: [x] Alert and awake  [x] Oriented to person/place/time [x] Able to follow commands    [] Abnormal -     Eyes:   EOM    [x]  Normal    [] Abnormal -   Sclera  [x]  Normal    [] Abnormal -          Discharge [x]  None visible   [] Abnormal -     HENT: [x] Normocephalic, atraumatic  [] Abnormal -   [x] Mouth/Throat: Mucous membranes are moist    External Ears [x] Normal  [] Abnormal -    Neck: [x] No visualized mass [] Abnormal - Pulmonary/Chest: [x] Respiratory effort normal   [x] No visualized signs of difficulty breathing or respiratory distress        [] Abnormal -      Musculoskeletal:   [x] Normal gait with no signs of ataxia         [x] Normal range of motion of neck        [] Abnormal -     Neurological:        [x] No Facial Asymmetry (Cranial nerve 7 motor function) (limited exam due to video visit)          [x] No gaze palsy        [] Abnormal -          Skin:        [x] No significant exanthematous lesions or discoloration noted on facial skin         [] Abnormal -            Psychiatric:       [x] Normal Affect [] Abnormal -        [x] No Hallucinations    Other pertinent observable physical exam findings:-      Assessment / Plan    Encounter Diagnoses   Name Primary?  Obesity (BMI 30-39. 9) Yes    Obesity, Class II, BMI 35-39.9, isolated (see actual BMI)     GUDELIA (obstructive sleep apnea)     Hypercholesterolemia      Diagnoses and all orders for this visit:    1. Obesity (BMI 30-39. 9)  He has done very well. Has now lost 100 lbs  He will need an EKG at the next visit  Cont the LCD  Doing well with exercise as routine  Do labs at next weight check. Were ordered in nov  I counseled him for more than 50% of this more than 20 min visit on his eating plan and exercise routine. Recheck 1 month  2. Obesity, Class II, BMI 35-39.9, isolated (see actual BMI)    3. GUDELIA (obstructive sleep apnea)  Cont cpap   Chron condition  4. Hypercholesterolemia  Cont to monitor   condition      1. Weight management improved   Progress was reviewed with patient    2. Labs    Latest results reviewed with patient          face to face time with Jalil Fearing consisted of counseling & coordinating and/or discussing treatment plans in reference to his obesity The primary encounter diagnosis was Obesity (BMI 30-39.9).  Diagnoses of Obesity, Class II, BMI 35-39.9, isolated (see actual BMI), GUDELIA (obstructive sleep apnea), and Hypercholesterolemia were also pertinent to this visit. Coding Help - Use CPT Codes 22342-81402, 20743-59349 for Established and New Patients respectively, either employing EM elements or Time rules. Other codes (example consult codes) may also apply. We discussed the expected course, resolution and complications of the diagnosis(es) in detail. Medication risks, benefits, costs, interactions, and alternatives were discussed as indicated. I advised him to contact the office if his condition worsens, changes or fails to improve as anticipated. He expressed understanding with the diagnosis(es) and plan. Pursuant to the emergency declaration under the [facility] waiver authority and the [employer] Act, this Virtual  Visit was conducted, with patient's consent, to reduce the patient's risk of exposure to COVID-19 and provide continuity of care for an established patient. Services were provided through a video synchronous discussion virtually to substitute for in-person clinic visit.     Lili Morgan MD

## 2022-01-19 NOTE — PROGRESS NOTES
Weight Management. 1 month follow up. Virtual Visit. 1. Have you been to the ER, urgent care clinic since your last visit? Hospitalized since your last visit? No    2. Have you seen or consulted any other health care providers outside of the [address] [place] [address] since your last visit? Include any pap smears or colon screening.  No

## 2022-01-20 ENCOUNTER — OFFICE VISIT (OUTPATIENT)
Dept: SURGERY | Age: 37
End: 2022-01-20

## 2022-01-20 DIAGNOSIS — E66.9 OBESITY (BMI 30-39.9): Primary | ICD-10-CM

## 2022-01-21 NOTE — PROGRESS NOTES
[facility]  Metabolic Weight Loss Program        Patient's Name: Humberto Pacheco. : 1985    This patient is enrolled in [address] [place] [address] Weight Loss Program and attended the required weekly virtual nutrition class hosted via [address] [place] [address] today.       Dee Wagner, MS, RD, LDN

## 2022-01-31 ENCOUNTER — CLINICAL SUPPORT (OUTPATIENT)
Dept: SURGERY | Age: 37
End: 2022-01-31

## 2022-01-31 VITALS
OXYGEN SATURATION: 100 % | SYSTOLIC BLOOD PRESSURE: 102 MMHG | HEART RATE: 77 BPM | WEIGHT: 221.4 LBS | DIASTOLIC BLOOD PRESSURE: 76 MMHG | BODY MASS INDEX: 33.66 KG/M2

## 2022-01-31 DIAGNOSIS — G47.33 OSA (OBSTRUCTIVE SLEEP APNEA): ICD-10-CM

## 2022-01-31 DIAGNOSIS — E78.00 HYPERCHOLESTEROLEMIA: ICD-10-CM

## 2022-01-31 DIAGNOSIS — E66.9 OBESITY (BMI 30-39.9): Primary | ICD-10-CM

## 2022-01-31 NOTE — PROGRESS NOTES
Nurse note from patient's weekly  LCD / Maintenance class was reviewed. Pertinent medical concerns were:   Continues to do great  Needs EKG at next visit     BP Readings from Last 3 Encounters:   01/17/22 104/68   12/20/21 96/62   12/06/21 110/60       Weight Metrics 1/17/2022 12/20/2021 12/6/2021 11/22/2021 11/8/2021 10/25/2021 10/11/2021   Weight 222 lb 12.8 oz 225 lb 14.4 oz 228 lb 14.4 oz 229 lb 233 lb 1.6 oz 233 lb 8 oz 236 lb 14.4 oz   Neck Circ (inches) - - - - - - -   Waist Measure Inches - - - - - - -   Body Fat % - - - - - - -   BMI 33.88 kg/m2 34.35 kg/m2 34.8 kg/m2 34.82 kg/m2 35.44 kg/m2 35.5 kg/m2 36.02 kg/m2       Current Outpatient Medications   Medication Sig Dispense Refill    multivitamins chew Take 2 Each by mouth daily.  Vitafsion Mens Daily

## 2022-01-31 NOTE — PROGRESS NOTES
1. Have you been to the ER, urgent care clinic since your last visit? Hospitalized since your last visit? No    2. Have you seen or consulted any other health care providers outside of the [address] [place] [address] since your last visit? Include any pap smears or colon screening. No     Pt blood pressure is low with 1st check. Pt indicated that he has been having dizziness for the last few days (none at time of triage). Pt states he has sent Dr. Barnett Cottle office a message in the portal.    Per the nurse triage duties list writer notified pt to make sure he drinking plenty of water at least 2 liters and that he can drink 1-2 servings of regular bouillon to replace sodium and raise blood volume. Pt sated understanding. 01/17/2022  Progress Note: Weekly Education Class in the [facility]         Patient is on Very Low Calorie Diet [] (4 meal replacements per day, 800 kcal/day)      Low Calorie Diet [x] (2-3 meal replacements per day, 8903-1194 kcal/day)    1) Did patient have any new symptoms or physical problems? Yes []    No [x]    If yes, check & comment: weakness [], fatigue [], lightheadedness [], headache [], cramps [], cold intolerance [], hair loss [], diarrhea [], constipation [],  NA [] other:                                 2) Has patient had any medical attention from other providers, urgent care or the emergency room this week? Yes []  No [x]       NA [], If yes, why:                                      3) Any other sugar sweetened beverages consumed this week? Yes [x]  No []    4) Did patient have any problems adhering to the diet? Yes []  No [x] NA []    If yes, Vacation [], Celebrations [], Conferences [], Family Reunions [] other:                                               5) How many hours of sleep this week? 5-8   (range)  NA []    Number of meal replacements consumed daily?  1-2 (range)  NA []    Average ounces of water patient consumed daily this week (not including shakes)? 80    (divide the weekly total by 7)    Did you eat any food outside of the program? Yes [] No []    Physical Activity Over the Past Week:    Cardio exercise:20 min  Strength exercise: 2 workouts / week  Number of steps walked per day: n/a    How has patient mood overall been this week? Sad [], Happy [], Stressed [], Tired [], Content [], NA [x], other            Medications reconciled by nurse Yes [x]  No[]    Patient was given therapeutic recommendations for any noted side effects of their dietary approach based upon [facility] patient manual per providers recommendation. 01/24/2022      Progress Note: Weekly Education Class in the [facility] Weight Loss Program         Patient is on Very Low Calorie Diet [] (4 meal replacements per day, 800 kcal/day)      Low Calorie Diet [x] (2-3 meal replacements per day, 9998-6940 kcal/day)    1) Did patient have any new symptoms or physical problems? Yes [x]    No []    If yes, check & comment: weakness [], fatigue [], lightheadedness [], headache [], cramps [], cold intolerance [], hair loss [], diarrhea [], constipation [],  NA [] other:   DIZZINESS                                2) Has patient had any medical attention from other providers, urgent care or the emergency room this week? Yes []  No [x]       NA [], If yes, why:                                      3) Any other sugar sweetened beverages consumed this week? Yes []  No [x]    4) Did patient have any problems adhering to the diet? Yes []  No [x] NA []    If yes, Vacation [], Celebrations [], Conferences [], Family Reunions [] other:                                                5) How many hours of sleep this week? 5-7   (range)  NA []    Number of meal replacements consumed daily? 2(range)  NA []    Average ounces of water patient consumed daily this week (not including shakes)?  80    (divide the weekly total by 7)    Did you eat any food outside of the program? Yes [] No [x]    Physical Activity Over the Past Week:    Cardio exercise: N/A min  Strength exercise: 2 workouts / week  Number of steps walked per day: N/A    How has patient mood overall been this week? Sad [], Happy [], Stressed [], Tired [], Content [], NA [x], other            Medications reconciled by nurse Yes [x]  No[]    Patient was given therapeutic recommendations for any noted side effects of their dietary approach based upon [facility] patient manual per providers recommendation.

## 2022-02-01 NOTE — PROGRESS NOTES
Nurse note from patient's weekly / LCD / Maintenance class was reviewed. Pertinent medical concerns were:   Doing great     BP Readings from Last 3 Encounters:   01/31/22 102/76   01/17/22 104/68   12/20/21 96/62       Weight Metrics 1/31/2022 1/17/2022 12/20/2021 12/6/2021 11/22/2021 11/8/2021 10/25/2021   Weight 221 lb 6.4 oz 222 lb 12.8 oz 225 lb 14.4 oz 228 lb 14.4 oz 229 lb 233 lb 1.6 oz 233 lb 8 oz   Neck Circ (inches) - - - - - - -   Waist Measure Inches - - - - - - -   Body Fat % - - - - - - -   BMI 33.66 kg/m2 33.88 kg/m2 34.35 kg/m2 34.8 kg/m2 34.82 kg/m2 35.44 kg/m2 35.5 kg/m2       Current Outpatient Medications   Medication Sig Dispense Refill    multivitamins chew Take 2 Each by mouth daily.  Vitafsion Mens Daily

## 2022-02-03 ENCOUNTER — OFFICE VISIT (OUTPATIENT)
Dept: SURGERY | Age: 37
End: 2022-02-03

## 2022-02-03 DIAGNOSIS — E66.9 OBESITY (BMI 30-39.9): Primary | ICD-10-CM

## 2022-02-04 NOTE — PROGRESS NOTES
[facility]  Metabolic Weight Loss Program        Patient's Name: Dennis Hazel. : 1985    This patient is enrolled in [address] [place] [address] Weight Loss Program and attended the required weekly virtual nutrition class hosted via [employer] today.       Pierce Wooten, MS, RD, LDN

## 2022-02-14 ENCOUNTER — CLINICAL SUPPORT (OUTPATIENT)
Dept: SURGERY | Age: 37
End: 2022-02-14

## 2022-02-14 VITALS
WEIGHT: 219.2 LBS | SYSTOLIC BLOOD PRESSURE: 107 MMHG | BODY MASS INDEX: 33.22 KG/M2 | TEMPERATURE: 98.3 F | HEART RATE: 71 BPM | RESPIRATION RATE: 18 BRPM | OXYGEN SATURATION: 97 % | HEIGHT: 68 IN | DIASTOLIC BLOOD PRESSURE: 70 MMHG

## 2022-02-14 DIAGNOSIS — E78.00 HYPERCHOLESTEROLEMIA: ICD-10-CM

## 2022-02-14 DIAGNOSIS — E66.9 OBESITY (BMI 30-39.9): Primary | ICD-10-CM

## 2022-02-14 DIAGNOSIS — G47.33 OSA (OBSTRUCTIVE SLEEP APNEA): ICD-10-CM

## 2022-02-14 NOTE — PROGRESS NOTES
Weight Management. 1. Have you been to the ER, urgent care clinic since your last visit? Hospitalized since your last visit? No    2. Have you seen or consulted any other health care providers outside of the [address] [place] [address] since your last visit? Include any pap smears or colon screening. No     1/31/2022    Progress Note: Weekly Education Class in the [facility]         Patient is on Very Low Calorie Diet [] (4 meal replacements per day, 800 kcal/day)      Low Calorie Diet [x] (2-3 meal replacements per day, 6548-9404 kcal/day)    1) Did patient have any new symptoms or physical problems? Yes []    No [x]    If yes, check & comment: weakness [], fatigue [], lightheadedness [], headache [], cramps [], cold intolerance [], hair loss [], diarrhea [], constipation [],  NA [] other:                                 2) Has patient had any medical attention from other providers, urgent care or the emergency room this week? Yes []  No [x]       NA [], If yes, why:                                       3) Any other sugar sweetened beverages consumed this week? Yes []  No [x]    4) Did patient have any problems adhering to the diet? Yes []  No [x] NA []    If yes, Vacation [], Celebrations [], Conferences [], Family Reunions [] other:                                                5) How many hours of sleep this week? 5-8    (range)  NA []    Number of meal replacements consumed daily? 2 (range)  NA []    Average ounces of water patient consumed daily this week (not including shakes)? 80     (divide the weekly total by 7)    Did you eat any food outside of the program? Yes [x] No []    Physical Activity Over the Past Week:    Cardio exercise: 20 min  Strength exercise: 1 workouts / week  Number of steps walked per day: 0    How has patient mood overall been this week?  Sad [], Happy [], Stressed [], Tired [], Content [], NA [], other   Not answered           Medications reconciled by nurse Yes [x]  No[]    Patient was given therapeutic recommendations for any noted side effects of their dietary approach based upon [facility] patient manual per providers recommendation. 2/7/2022    Progress Note: Weekly Education Class in the [facility] Weight Loss Program         Patient is on Very Low Calorie Diet [] (4 meal replacements per day, 800 kcal/day)      Low Calorie Diet [x] (2-3 meal replacements per day, 8968-1564 kcal/day)    1) Did patient have any new symptoms or physical problems? Yes []    No [x]    If yes, check & comment: weakness [], fatigue [], lightheadedness [], headache [], cramps [], cold intolerance [], hair loss [], diarrhea [], constipation [],  NA [] other:                                 2) Has patient had any medical attention from other providers, urgent care or the emergency room this week? Yes []  No [x]       NA [], If yes, why:                                       3) Any other sugar sweetened beverages consumed this week? Yes []  No [x]    4) Did patient have any problems adhering to the diet? Yes []  No [x] NA []    If yes, Vacation [], Celebrations [], Conferences [], Family Reunions [] other:                                                5) How many hours of sleep this week? 5-8    (range)  NA []    Number of meal replacements consumed daily? 2 (range)  NA []    Average ounces of water patient consumed daily this week (not including shakes)? 80     (divide the weekly total by 7)    Did you eat any food outside of the program? Yes [x] No []    Physical Activity Over the Past Week:    Cardio exercise: 0 min  Strength exercise: 0 workouts / week  Number of steps walked per day: 0    How has patient mood overall been this week?  Sad [], Happy [], Stressed [], Tired [], Content [], NA [], other   Not answered           Medications reconciled by nurse Yes [x]  No[]    Patient was given therapeutic recommendations for any noted side effects of their dietary approach based upon [facility] patient manual per providers recommendation.

## 2022-02-17 ENCOUNTER — OFFICE VISIT (OUTPATIENT)
Dept: SURGERY | Age: 37
End: 2022-02-17

## 2022-02-17 DIAGNOSIS — E66.9 OBESITY (BMI 30-39.9): Primary | ICD-10-CM

## 2022-02-17 NOTE — PROGRESS NOTES
[facility]  Metabolic Weight Loss Program        Patient's Name: Diya Luis. : 1985    This patient is enrolled in [address] [place] [address] Weight Loss Program and attended the required weekly virtual nutrition class hosted via [employer].       Mandie Quintero, MS, RD, LDN

## 2022-02-28 ENCOUNTER — CLINICAL SUPPORT (OUTPATIENT)
Dept: SURGERY | Age: 37
End: 2022-02-28

## 2022-02-28 VITALS
HEIGHT: 68 IN | OXYGEN SATURATION: 100 % | BODY MASS INDEX: 33.3 KG/M2 | HEART RATE: 79 BPM | WEIGHT: 219.7 LBS | RESPIRATION RATE: 18 BRPM | SYSTOLIC BLOOD PRESSURE: 102 MMHG | DIASTOLIC BLOOD PRESSURE: 62 MMHG | TEMPERATURE: 97.8 F

## 2022-02-28 DIAGNOSIS — G47.33 OSA (OBSTRUCTIVE SLEEP APNEA): ICD-10-CM

## 2022-02-28 DIAGNOSIS — E66.9 OBESITY (BMI 30-39.9): Primary | ICD-10-CM

## 2022-02-28 DIAGNOSIS — E78.00 HYPERCHOLESTEROLEMIA: ICD-10-CM

## 2022-02-28 NOTE — PROGRESS NOTES
1. Have you been to the ER, urgent care clinic since your last visit? Hospitalized since your last visit? No    2. Have you seen or consulted any other health care providers outside of the [address] [place] [address] since your last visit? Include any pap smears or colon screening. No       02/14/2022  Progress Note: Weekly Education Class in the [facility] Weight Loss Program         Patient is on Very Low Calorie Diet [] (4 meal replacements per day, 800 kcal/day)      Low Calorie Diet [x] (2-3 meal replacements per day, 4820-8839 kcal/day)    1) Did patient have any new symptoms or physical problems? Yes []    No []    If yes, check & comment: weakness [], fatigue [], lightheadedness [], headache [], cramps [], cold intolerance [], hair loss [], diarrhea [], constipation [],  NA [] other:                                 2) Has patient had any medical attention from other providers, urgent care or the emergency room this week? Yes []  No [x]       NA [], If yes, why:                                      3) Any other sugar sweetened beverages consumed this week? Yes []  No [x]    4) Did patient have any problems adhering to the diet? Yes []  No [x] NA []    If yes, Vacation [], Celebrations [], Conferences [], Family Reunions [] other:                                                5) How many hours of sleep this week? 5-7    (range)  NA []    Number of meal replacements consumed daily? 2 (range)  NA []    Average ounces of water patient consumed daily this week (not including shakes)? 80     (divide the weekly total by 7)    Did you eat any food outside of the program? Yes [] No [x]    Physical Activity Over the Past Week:    Cardio exercise: n/a min  Strength exercise: n/a workouts / week  Number of steps walked per day: n/a    How has patient mood overall been this week?  Sad [], Happy [], Stressed [], Tired [], Content [], NA [x], other           Medications reconciled by nurse Yes [x]  No[]    Patient was given therapeutic recommendations for any noted side effects of their dietary approach based upon [facility] patient manual per providers recommendation. 02/21/2022      Progress Note: Weekly Education Class in the [facility] Weight Loss Program         Patient is on Very Low Calorie Diet [] (4 meal replacements per day, 800 kcal/day)      Low Calorie Diet [x] (2-3 meal replacements per day, 2399-2407 kcal/day)    1) Did patient have any new symptoms or physical problems? Yes []    No [x]    If yes, check & comment: weakness [], fatigue [], lightheadedness [], headache [], cramps [], cold intolerance [], hair loss [], diarrhea [], constipation [],  NA [] other:                                 2) Has patient had any medical attention from other providers, urgent care or the emergency room this week? Yes []  No [x]       NA [], If yes, why:                                      3) Any other sugar sweetened beverages consumed this week? Yes []  No [x]    4) Did patient have any problems adhering to the diet? Yes []  No [x] NA []    If yes, Vacation [], Celebrations [], Conferences [], Family Reunions [] other:                                               5) How many hours of sleep this week? 4-8  (range)  NA []    Number of meal replacements consumed daily? 2 (range)  NA []    Average ounces of water patient consumed daily this week (not including shakes)? 80    (divide the weekly total by 7)    Did you eat any food outside of the program? Yes [x] No []    Physical Activity Over the Past Week:    Cardio exercise:n/a min  Strength exercise: n/a workouts / week  Number of steps walked per day: n/a    How has patient mood overall been this week?  Sad [], Happy [], Stressed [], Tired [], Content [], NA [x], other            Medications reconciled by nurse Yes [x]  No[]    Patient was given therapeutic recommendations for any noted side effects of their dietary approach based upon [facility] patient manual per providers recommendation.

## 2022-02-28 NOTE — PROGRESS NOTES
Nurse note from patient's weekly LCD / Maintenance class was reviewed. Pertinent medical concerns were:   Down 3     BP Readings from Last 3 Encounters:   02/28/22 102/62   02/14/22 107/70   01/31/22 102/76       Weight Metrics 2/28/2022 2/14/2022 1/31/2022 1/17/2022 12/20/2021 12/6/2021 11/22/2021   Weight 219 lb 11.2 oz 219 lb 3.2 oz 221 lb 6.4 oz 222 lb 12.8 oz 225 lb 14.4 oz 228 lb 14.4 oz 229 lb   Neck Circ (inches) - - - - - - -   Waist Measure Inches - - - - - - -   Body Fat % - - - - - - -   BMI 33.41 kg/m2 33.33 kg/m2 33.66 kg/m2 33.88 kg/m2 34.35 kg/m2 34.8 kg/m2 34.82 kg/m2       Current Outpatient Medications   Medication Sig Dispense Refill    multivitamins chew Take 2 Each by mouth daily.  Vitafsion Mens Daily

## 2022-02-28 NOTE — PROGRESS NOTES
Nurse note from patient's weekly  LCD / Maintenance class was reviewed. Pertinent medical concerns were:       BP Readings from Last 3 Encounters:   02/28/22 102/62   02/14/22 107/70   01/31/22 102/76       Weight Metrics 2/28/2022 2/14/2022 1/31/2022 1/17/2022 12/20/2021 12/6/2021 11/22/2021   Weight 219 lb 11.2 oz 219 lb 3.2 oz 221 lb 6.4 oz 222 lb 12.8 oz 225 lb 14.4 oz 228 lb 14.4 oz 229 lb   Neck Circ (inches) - - - - - - -   Waist Measure Inches - - - - - - -   Body Fat % - - - - - - -   BMI 33.41 kg/m2 33.33 kg/m2 33.66 kg/m2 33.88 kg/m2 34.35 kg/m2 34.8 kg/m2 34.82 kg/m2       Current Outpatient Medications   Medication Sig Dispense Refill    multivitamins chew Take 2 Each by mouth daily.  Vitafsion Mens Daily

## 2022-03-09 ENCOUNTER — VIRTUAL VISIT (OUTPATIENT)
Dept: SURGERY | Age: 37
End: 2022-03-09
Payer: COMMERCIAL

## 2022-03-09 DIAGNOSIS — E66.09 CLASS 1 OBESITY DUE TO EXCESS CALORIES WITH SERIOUS COMORBIDITY IN ADULT, UNSPECIFIED BMI: ICD-10-CM

## 2022-03-09 DIAGNOSIS — G47.33 OSA (OBSTRUCTIVE SLEEP APNEA): ICD-10-CM

## 2022-03-09 DIAGNOSIS — E78.00 HYPERCHOLESTEROLEMIA: ICD-10-CM

## 2022-03-09 DIAGNOSIS — E66.9 OBESITY (BMI 30-39.9): Primary | ICD-10-CM

## 2022-03-09 PROCEDURE — 99213 OFFICE O/P EST LOW 20 MIN: CPT | Performed by: FAMILY MEDICINE

## 2022-03-09 NOTE — PROGRESS NOTES
[facility] Progress Note:   F/up Physician Visit    Dashawn Grossman is a 44 y.o. male who was seen by synchronous (real-time) audio-video technology on 3/9/2022. Consent:  He and/or his healthcare decision maker is aware that this patient-initiated Telehealth encounter is a billable service, with coverage as determined by his insurance carrier. He is aware that he may receive a bill and has provided verbal consent to proceed: Yes    I was at home while conducting this encounter. 712  Subjective:   Dashawn Ramirez was seen for Weight Management    f/up physician visit for the VLCD / LCD Program.  Prior to Admission medications    Medication Sig Start Date End Date Taking? Authorizing Provider   multivitamins chew Take 2 Each by mouth daily. Vitafsion Mens Daily    Yes Provider, Historical     Allergies   Allergen Reactions    Doxycycline Other (comments)     Burning throat and ears       Patient Active Problem List    Diagnosis Date Noted    Chest pain 10/27/2017    Morbidly obese ([address] 10/27/2017     Current Outpatient Medications   Medication Sig Dispense Refill    multivitamins chew Take 2 Each by mouth daily. Vitafsion Mens Daily          ROS      CC: Weight Management    Home 216 lb    Dashawn Grossman is a 44 y.o. male who is here for his      Weight Metrics 3/15/2022 2/28/2022 2/14/2022 1/31/2022 1/17/2022 12/20/2021 12/6/2021   Weight 214 lb 11.2 oz 219 lb 11.2 oz 219 lb 3.2 oz 221 lb 6.4 oz 222 lb 12.8 oz 225 lb 14.4 oz 228 lb 14.4 oz   Neck Circ (inches) - - - - - - -   Waist Measure Inches - - - - - - -   Body Fat % - - - - - - -   BMI 32.65 kg/m2 33.41 kg/m2 33.33 kg/m2 33.66 kg/m2 33.88 kg/m2 34.35 kg/m2 34.8 kg/m2         Outpatient Medications Marked as Taking for the 3/9/22 encounter (Virtual Visit) with Placido Castro MD   Medication Sig Dispense Refill    multivitamins chew Take 2 Each by mouth daily.  Vitafsion Mens Daily            Participation Did you attend clinic and class last week? yes    Review of Systems  Since your last visit, have you experienced any complications? no  If yes, please list:     Are you taking an appetite suppressant? no  If so, is there any Chest Pain, Palpitations or Dizziness? HUNGER CONTROL: good    BP Readings from Last 3 Encounters:   03/15/22 110/70   02/28/22 102/62   02/14/22 107/70       SLEEP:  6-7 hrs on cpap    Have you received any other medical care this week? no  If yes, where and for what? Have you discontinued or changed any medicine or dose of your medicine since your last visit with Dr Jenaro Maravilla? no  If yes, where and for what? Diet  How many ounces of calorie-free liquids did you consume each day?  80 oz    How many meal replacements did you take each day? 2 -3 and a meal    Did you have any problems adhering to the program?  no If yes, please explain:      Exercise  Aerobic exercise: 3-4 days for 40  min  Resistance exercise: 3-4 workouts / week  Any discomfort?  no     If yes, where? Objective  There were no vitals taken for this visit. No LMP for male patient. PHYSICAL EXAMINATION:  [ INSTRUCTIONS:  \"[x]\" Indicates a positive item  \"[]\" Indicates a negative item  -- DELETE ALL ITEMS NOT EXAMINED]  Vital Signs: (As obtained by patient/caregiver at home)  There were no vitals taken for this visit.      Constitutional: [x] Appears well-developed and well-nourished [x] No apparent distress      [] Abnormal -     Mental status: [x] Alert and awake  [x] Oriented to person/place/time [x] Able to follow commands    [] Abnormal -     Eyes:   EOM    [x]  Normal    [] Abnormal -   Sclera  [x]  Normal    [] Abnormal -          Discharge [x]  None visible   [] Abnormal -     HENT: [x] Normocephalic, atraumatic  [] Abnormal -   [x] Mouth/Throat: Mucous membranes are moist    External Ears [x] Normal  [] Abnormal -    Neck: [x] No visualized mass [] Abnormal -     Pulmonary/Chest: [x] Respiratory effort normal   [x] No visualized signs of difficulty breathing or respiratory distress        [] Abnormal -      Musculoskeletal:   [x] Normal gait with no signs of ataxia         [x] Normal range of motion of neck        [] Abnormal -     Neurological:        [x] No Facial Asymmetry (Cranial nerve 7 motor function) (limited exam due to video visit)          [x] No gaze palsy        [] Abnormal -          Skin:        [x] No significant exanthematous lesions or discoloration noted on facial skin         [] Abnormal -            Psychiatric:       [x] Normal Affect [] Abnormal -        [x] No Hallucinations    Other pertinent observable physical exam findings:-      Assessment / Plan    Encounter Diagnoses   Name Primary?  Obesity (BMI 30-39. 9) Yes    Class 1 obesity due to excess calories with serious comorbidity in adult, unspecified BMI     GUDELIA (obstructive sleep apnea)     Hypercholesterolemia      Diagnoses and all orders for this visit:    1. Obesity (BMI 30-39. 9)  Cont the LCD  Doing well with the exercise  Also getting pretty good sleep. I would like for him to get 7-8   2. Class 1 obesity due to excess calories with serious comorbidity in adult, unspecified BMI    3. GUDELIA (obstructive sleep apnea)  Cont the LCD and cont the cpap until the BMI is below 30  4. Hypercholesterolemia  Cont to monitor    1. Weight management improved   Progress was reviewed with patient    2. Labs    Latest results reviewed with patient          face to face time with Kristal Jo consisted of counseling & coordinating and/or discussing treatment plans in reference to his obesity The primary encounter diagnosis was Obesity (BMI 30-39.9). Diagnoses of Class 1 obesity due to excess calories with serious comorbidity in adult, unspecified BMI, GUDELIA (obstructive sleep apnea), and Hypercholesterolemia were also pertinent to this visit.              Coding Help - Use CPT Codes 99522-00715, [id number] for Established and New Patients respectively, either employing EM elements or Time rules. Other codes (example consult codes) may also apply. We discussed the expected course, resolution and complications of the diagnosis(es) in detail. Medication risks, benefits, costs, interactions, and alternatives were discussed as indicated. I advised him to contact the office if his condition worsens, changes or fails to improve as anticipated. He expressed understanding with the diagnosis(es) and plan. Pursuant to the emergency declaration under the [address] [place] [address], [facility] waiver authority and the [employer] and [employer] Act, this Virtual  Visit was conducted, with patient's consent, to reduce the patient's risk of exposure to COVID-19 and provide continuity of care for an established patient. Services were provided through a video synchronous discussion virtually to substitute for in-person clinic visit.     Jarvis Dunham MD

## 2022-03-09 NOTE — PROGRESS NOTES
Patent calling to report progress after starting Bupropion. Has been taking this for about 1 month now. States she doesn't feel any different. Asking if this medicine could be increased. Pharmacy verified. Weight Management. 1 month follow up. Virtual Visit. 1. Have you been to the ER, urgent care clinic since your last visit? Hospitalized since your last visit? No    2. Have you seen or consulted any other health care providers outside of the [address] [place] [address] since your last visit? Include any pap smears or colon screening. No     Patient advised he needs to take BP at home for Virtual Visits. Also advised he can purchase BP cuff at any pharmacy, [facility], [place], etc.  Advised he probably needs a large cuff. Advised he can bring into office for teaching on how to use it. Patient verbalized understanding of all.

## 2022-03-10 ENCOUNTER — OFFICE VISIT (OUTPATIENT)
Dept: SURGERY | Age: 37
End: 2022-03-10

## 2022-03-10 DIAGNOSIS — E66.9 OBESITY (BMI 30-39.9): Primary | ICD-10-CM

## 2022-03-15 ENCOUNTER — CLINICAL SUPPORT (OUTPATIENT)
Dept: SURGERY | Age: 37
End: 2022-03-15

## 2022-03-15 VITALS
DIASTOLIC BLOOD PRESSURE: 70 MMHG | HEART RATE: 78 BPM | WEIGHT: 214.7 LBS | OXYGEN SATURATION: 100 % | SYSTOLIC BLOOD PRESSURE: 110 MMHG | RESPIRATION RATE: 18 BRPM | BODY MASS INDEX: 32.54 KG/M2 | HEIGHT: 68 IN | TEMPERATURE: 98.2 F

## 2022-03-15 DIAGNOSIS — G47.33 OSA (OBSTRUCTIVE SLEEP APNEA): ICD-10-CM

## 2022-03-15 DIAGNOSIS — E66.9 OBESITY (BMI 30-39.9): Primary | ICD-10-CM

## 2022-03-15 DIAGNOSIS — E78.00 HYPERCHOLESTEROLEMIA: ICD-10-CM

## 2022-03-15 NOTE — PROGRESS NOTES
1. Have you been to the ER, urgent care clinic since your last visit? Hospitalized since your last visit? No    2. Have you seen or consulted any other health care providers outside of the [address] [place] [address] since your last visit? Include any pap smears or colon screening. No         Progress Note: Weekly Education Class in the [facility]         Patient is on Very Low Calorie Diet [] (4 meal replacements per day, 800 kcal/day)      Low Calorie Diet [x] (2-3 meal replacements per day, 5184-7175 kcal/day)    1) Did patient have any new symptoms or physical problems? Yes []    No [x]    If yes, check & comment: weakness [], fatigue [], lightheadedness [], headache [], cramps [], cold intolerance [], hair loss [], diarrhea [], constipation [],  NA [] other:                                 2) Has patient had any medical attention from other providers, urgent care or the emergency room this week? Yes []  No [x]       NA [], If yes, why:                                      3) Any other sugar sweetened beverages consumed this week? Yes []  No [x]    4) Did patient have any problems adhering to the diet? Yes []  No [x] NA []    If yes, Vacation [], Celebrations [], Conferences [], Family Reunions [] other:                                                5) How many hours of sleep this week? 4-7   (range)  NA []    Number of meal replacements consumed daily? 2 (range)  NA []    Average ounces of water patient consumed daily this week (not including shakes)? 80   (divide the weekly total by 7)    Did you eat any food outside of the program? Yes [x] No []    Physical Activity Over the Past Week:    Cardio exercise: n/a min  Strength exercise: n/a workouts / week  Number of steps walked per day: n/a    How has patient mood overall been this week?  Sad [], Happy [], Stressed [], Tired [], Content [], NA [x], other            Medications reconciled by nurse Yes [x]  No[]    Patient was given therapeutic recommendations for any noted side effects of their dietary approach based upon [facility] patient manual per providers recommendation. Progress Note: Weekly Education Class in the [facility] Loss Program         Patient is on Very Low Calorie Diet [] (4 meal replacements per day, 800 kcal/day)      Low Calorie Diet [x] (2-3 meal replacements per day, 7758-9925 kcal/day)    1) Did patient have any new symptoms or physical problems? Yes []    No [x]    If yes, check & comment: weakness [], fatigue [], lightheadedness [], headache [], cramps [], cold intolerance [], hair loss [], diarrhea [], constipation [],  NA [] other:                                 2) Has patient had any medical attention from other providers, urgent care or the emergency room this week? Yes []  No [x]       NA [], If yes, why:                                      3) Any other sugar sweetened beverages consumed this week? Yes []  No [x]    4) Did patient have any problems adhering to the diet? Yes []  No [] NA [x]    If yes, Vacation [], Celebrations [], Conferences [], Family Reunions [] other:                                                5) How many hours of sleep this week? 5-8    (range)  NA []    Number of meal replacements consumed daily? 2 (range)  NA []    Average ounces of water patient consumed daily this week (not including shakes)? 8     (divide the weekly total by 7)    Did you eat any food outside of the program? Yes [x] No []    Physical Activity Over the Past Week:    Cardio exercise: n/a min  Strength exercise: n/a workouts / week  Number of steps walked per day: n/a    How has patient mood overall been this week?  Sad [], Happy [], Stressed [], Tired [], Content [], NA [x], other            Medications reconciled by nurse Yes [x]  No[]    Patient was given therapeutic recommendations for any noted side effects of their dietary approach based upon [facility] patient manual per providers recommendation.

## 2022-03-17 ENCOUNTER — OFFICE VISIT (OUTPATIENT)
Dept: SURGERY | Age: 37
End: 2022-03-17

## 2022-03-17 DIAGNOSIS — E66.9 OBESITY (BMI 30-39.9): Primary | ICD-10-CM

## 2022-03-17 NOTE — PROGRESS NOTES
[facility]  Metabolic Weight Loss Program        Patient's Name: Barbara Nesbitt. : 1985    This patient is enrolled in [address] [place] [address] Weight Loss Program and attended the required weekly virtual nutrition class hosted via [employer].       Mirella Hart MS, RD, LDN

## 2022-03-19 PROBLEM — E66.01 MORBIDLY OBESE (HCC): Status: ACTIVE | Noted: 2017-10-27

## 2022-03-20 PROBLEM — R07.9 CHEST PAIN: Status: ACTIVE | Noted: 2017-10-27

## 2022-03-24 ENCOUNTER — OFFICE VISIT (OUTPATIENT)
Dept: SURGERY | Age: 37
End: 2022-03-24

## 2022-03-24 DIAGNOSIS — E66.9 OBESITY (BMI 30-39.9): Primary | ICD-10-CM

## 2022-03-25 NOTE — PROGRESS NOTES
[facility]  Metabolic Weight Loss Program        Patient's Name: Dennis Hazel. : 1985    This patient is enrolled in [address] [place] [address] Weight Loss Program and attended the required weekly virtual nutrition class hosted via [employer] today.       Pierce Wooten, MS, RD, LDN

## 2022-03-28 ENCOUNTER — CLINICAL SUPPORT (OUTPATIENT)
Dept: SURGERY | Age: 37
End: 2022-03-28

## 2022-03-28 VITALS
WEIGHT: 219.7 LBS | HEIGHT: 68 IN | SYSTOLIC BLOOD PRESSURE: 111 MMHG | DIASTOLIC BLOOD PRESSURE: 75 MMHG | TEMPERATURE: 97.9 F | OXYGEN SATURATION: 98 % | HEART RATE: 70 BPM | RESPIRATION RATE: 18 BRPM | BODY MASS INDEX: 33.3 KG/M2

## 2022-03-28 DIAGNOSIS — E66.9 OBESITY (BMI 30-39.9): Primary | ICD-10-CM

## 2022-03-28 DIAGNOSIS — G47.33 OSA (OBSTRUCTIVE SLEEP APNEA): ICD-10-CM

## 2022-03-28 DIAGNOSIS — E78.00 HYPERCHOLESTEROLEMIA: ICD-10-CM

## 2022-03-28 NOTE — PROGRESS NOTES
Weight Management. 1. Have you been to the ER, urgent care clinic since your last visit? Hospitalized since your last visit? No    2. Have you seen or consulted any other health care providers outside of the [address] [place] [address] since your last visit? Include any pap smears or colon screening. No     3/14/2022    Progress Note: Weekly Education Class in the [facility] Weight Loss Program         Patient is on Very Low Calorie Diet [] (4 meal replacements per day, 800 kcal/day)      Low Calorie Diet [x] (2-3 meal replacements per day, 7883-1532 kcal/day)    1) Did patient have any new symptoms or physical problems? Yes []    No [x]    If yes, check & comment: weakness [], fatigue [], lightheadedness [], headache [], cramps [], cold intolerance [], hair loss [], diarrhea [], constipation [],  NA [] other:                                2) Has patient had any medical attention from other providers, urgent care or the emergency room this week? Yes []  No [x]       NA [], If yes, why:                                       3) Any other sugar sweetened beverages consumed this week? Yes []  No [x]    4) Did patient have any problems adhering to the diet? Yes []  No [x] NA []    If yes, Vacation [], Celebrations [], Conferences [], Family Reunions [] other:                                                5) How many hours of sleep this week? 5-7    (range)  NA []    Number of meal replacements consumed daily? 2 (range)  NA []    Average ounces of water patient consumed daily this week (not including shakes)? 80     (divide the weekly total by 7)    Did you eat any food outside of the program? Yes [x] No []    Physical Activity Over the Past Week:    Cardio exercise: 0 min  Strength exercise: 3 workouts / week  Number of steps walked per day: 0    How has patient mood overall been this week?  Sad [], Happy [], Stressed [], Tired [], Content [], NA [], other   NOT ANSWERED           Medications reconciled by nurse Yes [x]  No[]    Patient was given therapeutic recommendations for any noted side effects of their dietary approach based upon [facility] patient manual per providers recommendation. 3/21/2022    Progress Note: Weekly Education Class in the [facility] Loss Program         Patient is on Very Low Calorie Diet [] (4 meal replacements per day, 800 kcal/day)      Low Calorie Diet [x] (2-3 meal replacements per day, 9707-5789 kcal/day)    1) Did patient have any new symptoms or physical problems? Yes []    No [x]    If yes, check & comment: weakness [], fatigue [], lightheadedness [], headache [], cramps [], cold intolerance [], hair loss [], diarrhea [], constipation [],  NA [] other:                                 2) Has patient had any medical attention from other providers, urgent care or the emergency room this week? Yes []  No [x]       NA [], If yes, why:                                      3) Any other sugar sweetened beverages consumed this week? Yes []  No [x]    4) Did patient have any problems adhering to the diet? Yes []  No [x] NA []    If yes, Vacation [], Celebrations [], Conferences [], Family Reunions [] other:                                                5) How many hours of sleep this week? 5-8    (range)  NA []    Number of meal replacements consumed daily? 2 (range)  NA []    Average ounces of water patient consumed daily this week (not including shakes)? 80     (divide the weekly total by 7)    Did you eat any food outside of the program? Yes [x] No []    Physical Activity Over the Past Week:    Cardio exercise: 0 min  Strength exercise: 0 workouts / week  Number of steps walked per day: 0    How has patient mood overall been this week?  Sad [], Happy [], Stressed [], Tired [], Content [], NA [], other  NOT ANSWERED           Medications reconciled by nurse Yes [x]  No[]    Patient was given therapeutic recommendations for any noted side effects of their dietary approach based upon [facility] patient manual per providers recommendation.

## 2022-03-31 ENCOUNTER — OFFICE VISIT (OUTPATIENT)
Dept: SURGERY | Age: 37
End: 2022-03-31

## 2022-03-31 DIAGNOSIS — E66.9 OBESITY (BMI 30-39.9): Primary | ICD-10-CM

## 2022-04-01 NOTE — PROGRESS NOTES
[facility]  Metabolic Weight Loss Program        Patient's Name: Yumiko Kaplan. : 1985    This patient is enrolled in [address] [place] [address] Weight Loss Program and attended the required weekly virtual nutrition class hosted via [employer] today.       Manpreet Telles, MS, RD, LDN

## 2022-04-01 NOTE — PROGRESS NOTES
[facility]  Metabolic Weight Loss Program        Patient's Name: Rebeca Hurtado. : 1985    This patient is enrolled in [address] [place] [address] Weight Loss Program and attended the required weekly virtual nutrition class hosted via [employer].       Smita Blanco, MS, RD, LDN

## 2022-04-03 NOTE — PROGRESS NOTES
Nurse note from patient's weekly LCD / Maintenance class was reviewed. Pertinent medical concerns were:   Gained weight this week, recheck in 2 weeks     BP Readings from Last 3 Encounters:   03/28/22 111/75   03/15/22 110/70   02/28/22 102/62       Weight Metrics 3/28/2022 3/15/2022 2/28/2022 2/14/2022 1/31/2022 1/17/2022 12/20/2021   Weight 219 lb 11.2 oz 214 lb 11.2 oz 219 lb 11.2 oz 219 lb 3.2 oz 221 lb 6.4 oz 222 lb 12.8 oz 225 lb 14.4 oz   Neck Circ (inches) - - - - - - -   Waist Measure Inches - - - - - - -   Body Fat % - - - - - - -   BMI 33.41 kg/m2 32.65 kg/m2 33.41 kg/m2 33.33 kg/m2 33.66 kg/m2 33.88 kg/m2 34.35 kg/m2       Current Outpatient Medications   Medication Sig Dispense Refill    multivitamins chew Take 2 Each by mouth daily.  Vitafsion Mens Daily

## 2022-04-03 NOTE — PROGRESS NOTES
Nurse note from patient's weekly LCD / Maintenance class was reviewed. Pertinent medical concerns were:   Down 5 lbs     BP Readings from Last 3 Encounters:   03/28/22 111/75   03/15/22 110/70   02/28/22 102/62       Weight Metrics 3/28/2022 3/15/2022 2/28/2022 2/14/2022 1/31/2022 1/17/2022 12/20/2021   Weight 219 lb 11.2 oz 214 lb 11.2 oz 219 lb 11.2 oz 219 lb 3.2 oz 221 lb 6.4 oz 222 lb 12.8 oz 225 lb 14.4 oz   Neck Circ (inches) - - - - - - -   Waist Measure Inches - - - - - - -   Body Fat % - - - - - - -   BMI 33.41 kg/m2 32.65 kg/m2 33.41 kg/m2 33.33 kg/m2 33.66 kg/m2 33.88 kg/m2 34.35 kg/m2       Current Outpatient Medications   Medication Sig Dispense Refill    multivitamins chew Take 2 Each by mouth daily.  Vitafsion Mens Daily

## 2022-04-12 ENCOUNTER — OFFICE VISIT (OUTPATIENT)
Dept: SURGERY | Age: 37
End: 2022-04-12
Payer: COMMERCIAL

## 2022-04-12 VITALS
TEMPERATURE: 97.8 F | BODY MASS INDEX: 32.57 KG/M2 | RESPIRATION RATE: 18 BRPM | WEIGHT: 214.9 LBS | DIASTOLIC BLOOD PRESSURE: 66 MMHG | SYSTOLIC BLOOD PRESSURE: 104 MMHG | HEIGHT: 68 IN | HEART RATE: 62 BPM | OXYGEN SATURATION: 98 %

## 2022-04-12 DIAGNOSIS — G47.33 OSA (OBSTRUCTIVE SLEEP APNEA): ICD-10-CM

## 2022-04-12 DIAGNOSIS — Z13.6 SCREENING FOR ISCHEMIC HEART DISEASE: ICD-10-CM

## 2022-04-12 DIAGNOSIS — E78.00 HYPERCHOLESTEROLEMIA: ICD-10-CM

## 2022-04-12 DIAGNOSIS — E66.9 OBESITY (BMI 30-39.9): Primary | ICD-10-CM

## 2022-04-12 PROCEDURE — 93000 ELECTROCARDIOGRAM COMPLETE: CPT | Performed by: FAMILY MEDICINE

## 2022-04-12 PROCEDURE — 99214 OFFICE O/P EST MOD 30 MIN: CPT | Performed by: FAMILY MEDICINE

## 2022-04-12 RX ORDER — BUPROPION HYDROCHLORIDE 100 MG/1
100 TABLET, EXTENDED RELEASE ORAL 2 TIMES DAILY
Qty: 60 TABLET | Refills: 2 | Status: SHIPPED | OUTPATIENT
Start: 2022-04-12 | End: 2022-08-16 | Stop reason: ALTCHOICE

## 2022-04-12 NOTE — PROGRESS NOTES
3/29/2022    Progress Note: Weekly Education Class in the [facility] Weight Loss Program         Patient is on Very Low Calorie Diet [] (4 meal replacements per day, 800 kcal/day)      Low Calorie Diet [x] (2-3 meal replacements per day, 0205-6302 kcal/day)    1) Did patient have any new symptoms or physical problems? Yes []    No [x]    If yes, check & comment: weakness [], fatigue [], lightheadedness [], headache [], cramps [], cold intolerance [], hair loss [], diarrhea [], constipation [],  NA [] other:                                 2) Has patient had any medical attention from other providers, urgent care or the emergency room this week? Yes []  No [x]       NA [], If yes, why:                                       3) Any other sugar sweetened beverages consumed this week? Yes []  No [x]    4) Did patient have any problems adhering to the diet? Yes []  No [x] NA []    If yes, Vacation [], Celebrations [], Conferences [], Family Reunions [] other:                                                5) How many hours of sleep this week? 6-8    (range)  NA []    Number of meal replacements consumed daily? 2  (range)  NA []    Average ounces of water patient consumed daily this week (not including shakes)? 80     (divide the weekly total by 7)    Did you eat any food outside of the program? Yes [x] No []    Physical Activity Over the Past Week:    Cardio exercise: 0 min  Strength exercise: 4 workouts / week  Number of steps walked per day: 0    How has patient mood overall been this week? Sad [], Happy [], Stressed [], Tired [], Content [], NA [], other   NOT ANSWERED             Medications reconciled by nurse Yes [x]  No[]    Patient was given therapeutic recommendations for any noted side effects of their dietary approach based upon [facility] patient manual per providers recommendation.      4/5/2022    Progress Note: Weekly Education Class in the [facility] Weight Loss Program         Patient is on Very Low Calorie Diet [] (4 meal replacements per day, 800 kcal/day)      Low Calorie Diet [x] (2-3 meal replacements per day, 5946-9635 kcal/day)    1) Did patient have any new symptoms or physical problems? Yes []    No [x]    If yes, check & comment: weakness [], fatigue [], lightheadedness [], headache [], cramps [], cold intolerance [], hair loss [], diarrhea [], constipation [],  NA [] other:                                 2) Has patient had any medical attention from other providers, urgent care or the emergency room this week? Yes []  No [x]       NA [], If yes, why:                                       3) Any other sugar sweetened beverages consumed this week? Yes []  No [x]    4) Did patient have any problems adhering to the diet? Yes []  No [x] NA []    If yes, Vacation [], Celebrations [], Conferences [], Family Reunions [] other:                                                5) How many hours of sleep this week? 5-8    (range)  NA []    Number of meal replacements consumed daily? 2 (range)  NA []    Average ounces of water patient consumed daily this week (not including shakes)? 80     (divide the weekly total by 7)    Did you eat any food outside of the program? Yes [x] No []    Physical Activity Over the Past Week:    Cardio exercise: 0 min  Strength exercise: 3 workouts / week  Number of steps walked per day: 0    How has patient mood overall been this week? Sad [], Happy [], Stressed [], Tired [], Content [], NA [], other  NOT ANSWERED             Medications reconciled by nurse Yes [x]  No[]    Patient was given therapeutic recommendations for any noted side effects of their dietary approach based upon [facility] patient manual per providers recommendation.

## 2022-04-12 NOTE — PROGRESS NOTES
Weight Management. 1 month follow up. 1. Have you been to the ER, urgent care clinic since your last visit? Hospitalized since your last visit? No    2. Have you seen or consulted any other health care providers outside of the [address] [place] [address] since your last visit? Include any pap smears or colon screening.  No     BMI - 32.6

## 2022-04-12 NOTE — PROGRESS NOTES
[facility] Progress Note:   F/up Physician Visit    CC: Weight Management      Daniel Sarabia. is a 44 y.o. male who is here for his  f/up physician visit for the LCD Program.  Started feeling more hungry the last month  The increased hunger started after He started exercising more    Weight Metrics 4/12/2022 4/12/2022 3/28/2022 3/15/2022 2/28/2022 2/14/2022 1/31/2022   Weight - 214 lb 14.4 oz 219 lb 11.2 oz 214 lb 11.2 oz 219 lb 11.2 oz 219 lb 3.2 oz 221 lb 6.4 oz   Neck Circ (inches) 14.75 - - - - - -   Waist Measure Inches 39.75 - - - - - -   Body Fat % 27.6 - - - - - -   BMI - 32.68 kg/m2 33.41 kg/m2 32.65 kg/m2 33.41 kg/m2 33.33 kg/m2 33.66 kg/m2         Outpatient Medications Marked as Taking for the 4/12/22 encounter (Office Visit) with Bc Velasquez MD   Medication Sig Dispense Refill    buPROPion SR (WELLBUTRIN SR) 100 mg SR tablet Take 1 Tablet by mouth two (2) times a day. 60 Tablet 2    multivitamins chew Take 2 Each by mouth daily. Vitafsion Mens Daily            Participation   Did you attend clinic and class last week? yes    Review of Systems  Since your last visit, have you experienced any complications? no  If yes, please list:       Are you taking an appetite suppressant? no  If so, is there any Chest Pain, Palpitations or Dizziness? HUNGER CONTROL: poor recently    BP Readings from Last 3 Encounters:   04/12/22 104/66   03/28/22 111/75   03/15/22 110/70       SLEEP: 6-8 hrs    Have you received any other medical care this week? no  If yes, where and for what? Have you discontinued or changed any medicine or dose of your medicine since your last visit with Dr Ja Noguera? no  If yes, where and for what? Diet  How many ounces of calorie-free liquids did you consume each day?  80 oz    How many meal replacements did you take each day?  2    Did you have any problems adhering to the program?  no If yes, please explain:      Exercise  Aerobic exercise: 25 min  Resistance exercise: 4-5 workouts / week  Any discomfort?  no     If yes, where? Objective  Visit Vitals  /66 (BP 1 Location: Right arm, BP Patient Position: Sitting, BP Cuff Size: Adult)   Pulse 62   Temp 97.8 °F (36.6 °C) (Oral)   Resp 18   Ht 5' 8\" (1.727 m)   Wt 214 lb 14.4 oz (97.5 kg)   SpO2 98%   BMI 32.68 kg/m²     No LMP for male patient. Physical Exam  Appearance: well,   Mental:A&O x 3, NAD  H:NC/AT,  EENT:   EOMI, PERRL, No scleral icterus  Neck: no bruit or JVD  Lung: clear, No W/R  ABD: soft, active, nontender  Ext:  no Edema  Neuro: nonfocal  Assessment / Plan    Encounter Diagnoses   Name Primary?  Obesity (BMI 30-39. 9) Yes    GUDELIA (obstructive sleep apnea)     Hypercholesterolemia     Screening for ischemic heart disease      Diagnoses and all orders for this visit:    1. Obesity (BMI 94-59.4)  -     METABOLIC PANEL, COMPREHENSIVE; Future  -     buPROPion SR (WELLBUTRIN SR) 100 mg SR tablet; Take 1 Tablet by mouth two (2) times a day. Start appetite suppressant   have vegetables with the shake at lunch  2. GUDELIA (obstructive sleep apnea)  Cont using cpap  When he gets to BMI 29 he will need a retest of sleep  3. Hypercholesterolemia  -     LIPID PANEL; Future  -     METABOLIC PANEL, COMPREHENSIVE; Future  Cont to monitor  No meds  4. Screening for ischemic heart disease  -     AMB POC EKG ROUTINE W/ 12 LEADS, INTER & REP    EKG normal    1. Weight management improved   Progress was reviewed with patient    2. Labs    Latest results reviewed with patient       face to face time with Ignacia Al consisted of counseling & coordinating and/or discussing treatment plans in reference to his obesity The primary encounter diagnosis was Obesity (BMI 30-39.9). Diagnoses of GUDELIA (obstructive sleep apnea), Hypercholesterolemia, and Screening for ischemic heart disease were also pertinent to this visit.

## 2022-04-14 ENCOUNTER — OFFICE VISIT (OUTPATIENT)
Dept: SURGERY | Age: 37
End: 2022-04-14

## 2022-04-14 DIAGNOSIS — E66.01 MORBIDLY OBESE (HCC): Primary | ICD-10-CM

## 2022-04-14 NOTE — PROGRESS NOTES
[facility]  Metabolic Weight Loss Program        Patient's Name: Linda Arevalo. : 1985    This patient is enrolled in [address] [place] [address] Weight Loss Program and attended the required weekly virtual nutrition class hosted via [address] [place] [address] today.       MS Serene, RD, LDN

## 2022-04-25 ENCOUNTER — CLINICAL SUPPORT (OUTPATIENT)
Dept: SURGERY | Age: 37
End: 2022-04-25

## 2022-04-25 VITALS
SYSTOLIC BLOOD PRESSURE: 100 MMHG | DIASTOLIC BLOOD PRESSURE: 64 MMHG | WEIGHT: 211.3 LBS | HEART RATE: 69 BPM | OXYGEN SATURATION: 99 % | RESPIRATION RATE: 18 BRPM | BODY MASS INDEX: 32.02 KG/M2 | TEMPERATURE: 97.9 F | HEIGHT: 68 IN

## 2022-04-25 DIAGNOSIS — E66.01 MORBIDLY OBESE (HCC): Primary | ICD-10-CM

## 2022-04-25 DIAGNOSIS — E78.00 HYPERCHOLESTEROLEMIA: ICD-10-CM

## 2022-04-25 DIAGNOSIS — G47.33 OSA (OBSTRUCTIVE SLEEP APNEA): ICD-10-CM

## 2022-04-25 NOTE — PROGRESS NOTES
1. Have you been to the ER, urgent care clinic since your last visit? Hospitalized since your last visit? No    2. Have you seen or consulted any other health care providers outside of the [address] [place] [address] since your last visit? Include any pap smears or colon screening. No         Progress Note: Weekly Education Class in the [facility]         Patient is on Very Low Calorie Diet [] (4 meal replacements per day, 800 kcal/day)      Low Calorie Diet [x] (2-3 meal replacements per day, 8555-8970 kcal/day)    1) Did patient have any new symptoms or physical problems? Yes []    No [x]    If yes, check & comment: weakness [], fatigue [], lightheadedness [], headache [], cramps [], cold intolerance [], hair loss [], diarrhea [], constipation [],  NA [] other:                                 2) Has patient had any medical attention from other providers, urgent care or the emergency room this week? Yes []  No [x]       NA [], If yes, why:                                      3) Any other sugar sweetened beverages consumed this week? Yes []  No [x]    4) Did patient have any problems adhering to the diet? Yes []  No [x] NA []    If yes, Vacation [], Celebrations [], Conferences [], Family Reunions [] other:                                                5) How many hours of sleep this week? 5-8    (range)  NA []    Number of meal replacements consumed daily? 2(range)  NA []    Average ounces of water patient consumed daily this week (not including shakes)? 80     (divide the weekly total by 7)    Did you eat any food outside of the program? Yes [] No [x]    Physical Activity Over the Past Week:    Cardio exercise: 30 min  Strength exercise: 2 workouts / week  Number of steps walked per day: n/a    How has patient mood overall been this week?  Sad [], Happy [], Stressed [], Tired [], Content [], NA [x], other            Medications reconciled by nurse Yes [x]  No[]    Patient was given therapeutic recommendations for any noted side effects of their dietary approach based upon [facility] patient manual per providers recommendation. 04/18/2022      Progress Note: Weekly Education Class in the [facility] Program         Patient is on Very Low Calorie Diet [] (4 meal replacements per day, 800 kcal/day)      Low Calorie Diet [x] (2-3 meal replacements per day, 3961-9502 kcal/day)    1) Did patient have any new symptoms or physical problems? Yes []    No [x]    If yes, check & comment: weakness [], fatigue [], lightheadedness [], headache [], cramps [], cold intolerance [], hair loss [], diarrhea [], constipation [],  NA [] other:                                 2) Has patient had any medical attention from other providers, urgent care or the emergency room this week? Yes []  No [x]       NA [], If yes, why:                                      3) Any other sugar sweetened beverages consumed this week? Yes []  No [x]    4) Did patient have any problems adhering to the diet? Yes []  No [x] NA []    If yes, Vacation [], Celebrations [], Conferences [], Family Reunions [] other:                                               5) How many hours of sleep this week? 5-8    (range)  NA []    Number of meal replacements consumed daily? 2 (range)  NA []    Average ounces of water patient consumed daily this week (not including shakes)? 80    (divide the weekly total by 7)    Did you eat any food outside of the program? Yes [] No [x]    Physical Activity Over the Past Week:    Cardio exercise: n/a min  Strength exercise: n/a workouts / week  Number of steps walked per day: n/a    How has patient mood overall been this week?  Sad [], Happy [], Stressed [], Tired [], Content [], NA [x], other            Medications reconciled by nurse Yes [x]  No[]    Patient was given therapeutic recommendations for any noted side effects of their dietary approach based upon [facility] patient manual per providers recommendation.

## 2022-04-26 LAB
ALBUMIN SERPL-MCNC: 4.4 G/DL (ref 4–5)
ALBUMIN/GLOB SERPL: 1.3 {RATIO} (ref 1.2–2.2)
ALP SERPL-CCNC: 75 IU/L (ref 44–121)
ALT SERPL-CCNC: 15 IU/L (ref 0–44)
AST SERPL-CCNC: 18 IU/L (ref 0–40)
BILIRUB SERPL-MCNC: 0.6 MG/DL (ref 0–1.2)
BUN SERPL-MCNC: 16 MG/DL (ref 6–20)
BUN/CREAT SERPL: 18 (ref 9–20)
CALCIUM SERPL-MCNC: 9.5 MG/DL (ref 8.7–10.2)
CHLORIDE SERPL-SCNC: 102 MMOL/L (ref 96–106)
CHOLEST SERPL-MCNC: 169 MG/DL (ref 100–199)
CO2 SERPL-SCNC: 22 MMOL/L (ref 20–29)
CREAT SERPL-MCNC: 0.87 MG/DL (ref 0.76–1.27)
EGFR: 113 ML/MIN/1.73
GLOBULIN SER CALC-MCNC: 3.5 G/DL (ref 1.5–4.5)
GLUCOSE SERPL-MCNC: 75 MG/DL (ref 65–99)
HDLC SERPL-MCNC: 58 MG/DL
LDLC SERPL CALC-MCNC: 100 MG/DL (ref 0–99)
POTASSIUM SERPL-SCNC: 4.2 MMOL/L (ref 3.5–5.2)
PROT SERPL-MCNC: 7.9 G/DL (ref 6–8.5)
SODIUM SERPL-SCNC: 140 MMOL/L (ref 134–144)
TRIGL SERPL-MCNC: 53 MG/DL (ref 0–149)
VLDLC SERPL CALC-MCNC: 11 MG/DL (ref 5–40)

## 2022-04-28 ENCOUNTER — OFFICE VISIT (OUTPATIENT)
Dept: SURGERY | Age: 37
End: 2022-04-28

## 2022-04-28 DIAGNOSIS — E66.01 MORBIDLY OBESE (HCC): Primary | ICD-10-CM

## 2022-04-28 NOTE — PROGRESS NOTES
[facility]  Metabolic Weight Loss Program        Patient's Name: Ke Douglas. : 1985    This patient is enrolled in [address] [place] [address] Weight Loss Program and attended the required weekly virtual nutrition class hosted via [email or web address].       MS Serene, RD, LDN

## 2022-05-02 NOTE — PROGRESS NOTES
Nurse note from patient's weekly  LCD / Maintenance class was reviewed. Pertinent medical concerns were:   Down 3 lbs     BP Readings from Last 3 Encounters:   04/25/22 100/64   04/12/22 104/66   03/28/22 111/75       Weight Metrics 4/25/2022 4/12/2022 4/12/2022 3/28/2022 3/15/2022 2/28/2022 2/14/2022   Weight 211 lb 4.8 oz - 214 lb 14.4 oz 219 lb 11.2 oz 214 lb 11.2 oz 219 lb 11.2 oz 219 lb 3.2 oz   Neck Circ (inches) - 14.75 - - - - -   Waist Measure Inches - 39.75 - - - - -   Body Fat % - 27.6 - - - - -   BMI 32.13 kg/m2 - 32.68 kg/m2 33.41 kg/m2 32.65 kg/m2 33.41 kg/m2 33.33 kg/m2       Current Outpatient Medications   Medication Sig Dispense Refill    buPROPion SR (WELLBUTRIN SR) 100 mg SR tablet Take 1 Tablet by mouth two (2) times a day. 60 Tablet 2    multivitamins chew Take 2 Each by mouth daily.  Vitafsion Mens Daily

## 2022-05-05 ENCOUNTER — OFFICE VISIT (OUTPATIENT)
Dept: SURGERY | Age: 37
End: 2022-05-05

## 2022-05-05 DIAGNOSIS — E66.01 MORBIDLY OBESE (HCC): Primary | ICD-10-CM

## 2022-05-05 NOTE — PROGRESS NOTES
The liver and kidney tests are normal  The cholesterol is 1 point higher than the goal. This is likely to improve by the next test in 3 months

## 2022-05-10 ENCOUNTER — OFFICE VISIT (OUTPATIENT)
Dept: SURGERY | Age: 37
End: 2022-05-10
Payer: COMMERCIAL

## 2022-05-10 VITALS
HEIGHT: 68 IN | DIASTOLIC BLOOD PRESSURE: 69 MMHG | BODY MASS INDEX: 32.15 KG/M2 | HEART RATE: 57 BPM | RESPIRATION RATE: 18 BRPM | WEIGHT: 212.1 LBS | OXYGEN SATURATION: 98 % | TEMPERATURE: 97.9 F | SYSTOLIC BLOOD PRESSURE: 107 MMHG

## 2022-05-10 DIAGNOSIS — G47.33 OSA (OBSTRUCTIVE SLEEP APNEA): ICD-10-CM

## 2022-05-10 DIAGNOSIS — E66.9 OBESITY (BMI 30-39.9): Primary | ICD-10-CM

## 2022-05-10 DIAGNOSIS — E78.00 HYPERCHOLESTEROLEMIA: ICD-10-CM

## 2022-05-10 PROCEDURE — 99213 OFFICE O/P EST LOW 20 MIN: CPT | Performed by: FAMILY MEDICINE

## 2022-05-10 NOTE — PROGRESS NOTES
4/25/2022    Progress Note: Weekly Education Class in the [facility] Weight Loss Program         Patient is on Very Low Calorie Diet [] (4 meal replacements per day, 800 kcal/day)      Low Calorie Diet [x] (2-3 meal replacements per day, 6315-5687 kcal/day)    1) Did patient have any new symptoms or physical problems? Yes []    No [x]    If yes, check & comment: weakness [], fatigue [], lightheadedness [], headache [], cramps [], cold intolerance [], hair loss [], diarrhea [], constipation [],  NA [] other:                                 2) Has patient had any medical attention from other providers, urgent care or the emergency room this week? Yes []  No [x]       NA [], If yes, why:                                       3) Any other sugar sweetened beverages consumed this week? Yes []  No [x]    4) Did patient have any problems adhering to the diet? Yes []  No [x] NA []    If yes, Vacation [], Celebrations [], Conferences [], Family Reunions [] other:                                                5) How many hours of sleep this week? 4-8    (range)  NA []    Number of meal replacements consumed daily? 2  (range)  NA []    Average ounces of water patient consumed daily this week (not including shakes)? 80     (divide the weekly total by 7)    Did you eat any food outside of the program? Yes [x] No []    Physical Activity Over the Past Week:    Cardio exercise: 10 min  Strength exercise: 2 workouts / week  Number of steps walked per day: 0    How has patient mood overall been this week? Sad [], Happy [], Stressed [], Tired [], Content [], NA [], other   NOT ANSWERED             Medications reconciled by nurse Yes [x]  No[]    Patient was given therapeutic recommendations for any noted side effects of their dietary approach based upon [facility] patient manual per providers recommendation.      5/2/2022    Progress Note: Weekly Education Class in the [facility] Weight Loss Program         Patient is on Very Low Calorie Diet [] (4 meal replacements per day, 800 kcal/day)      Low Calorie Diet [x] (2-3 meal replacements per day, 9915-4087 kcal/day)    1) Did patient have any new symptoms or physical problems? Yes []    No [x]    If yes, check & comment: weakness [], fatigue [], lightheadedness [], headache [], cramps [], cold intolerance [], hair loss [], diarrhea [], constipation [],  NA [] other:                                 2) Has patient had any medical attention from other providers, urgent care or the emergency room this week? Yes []  No [x]       NA [], If yes, why:                                      3) Any other sugar sweetened beverages consumed this week? Yes []  No [x]    4) Did patient have any problems adhering to the diet? Yes []  No [x] NA []    If yes, Vacation [], Celebrations [], Conferences [], Family Reunions [] other:                                                5) How many hours of sleep this week? 5-8    (range)  NA []    Number of meal replacements consumed daily? 2  (range)  NA []    Average ounces of water patient consumed daily this week (not including shakes)? 80     (divide the weekly total by 7)    Did you eat any food outside of the program? Yes [x] No []    Physical Activity Over the Past Week:    Cardio exercise: 0 min  Strength exercise: 3 workouts / week  Number of steps walked per day: 0    How has patient mood overall been this week? Sad [], Happy [], Stressed [], Tired [], Content [], NA [], other   NOT ANSWERED             Medications reconciled by nurse Yes [x]  No[]    Patient was given therapeutic recommendations for any noted side effects of their dietary approach based upon [facility] patient manual per providers recommendation.

## 2022-05-10 NOTE — PROGRESS NOTES
[facility] Progress Note:   F/up Physician Visit    CC: Weight Management      Jackie Becerra. is a 44 y.o. male who is here for his  f/up physician visit for the LCD Program.    Weight Metrics 5/10/2022 5/10/2022 4/25/2022 4/12/2022 4/12/2022 3/28/2022 3/15/2022   Weight - 212 lb 1.6 oz 211 lb 4.8 oz - 214 lb 14.4 oz 219 lb 11.2 oz 214 lb 11.2 oz   Neck Circ (inches) 14.25 - - 14.75 - - -   Waist Measure Inches 39.25 - - 39.75 - - -   Body Fat % 27.4 - - 27.6 - - -   BMI - 32.25 kg/m2 32.13 kg/m2 - 32.68 kg/m2 33.41 kg/m2 32.65 kg/m2         Outpatient Medications Marked as Taking for the 5/10/22 encounter (Office Visit) with aGbe Iverson MD   Medication Sig Dispense Refill    buPROPion SR [facility] SR) 100 mg SR tablet Take 1 Tablet by mouth two (2) times a day. 60 Tablet 2    multivitamins chew Take 2 Each by mouth daily. Vitafsion Mens Daily          Increased almost1 lb this time  2 weeks ago he was at 208  He does not know what has caused the increase  He has a new work out program    Participation   Did you attend clinic and class last week? yes    Review of Systems  Since your last visit, have you experienced any complications? no  If yes, please list:     Are you taking an appetite suppressant? yes  If so, is there any Chest Pain, Palpitations or Dizziness? HUNGER CONTROL: good    BP Readings from Last 3 Encounters:   05/10/22 107/69   04/25/22 100/64   04/12/22 104/66       SLEEP: 7    Have you received any other medical care this week? no  If yes, where and for what? Have you discontinued or changed any medicine or dose of your medicine since your last visit with Dr Montana Hurley? no  If yes, where and for what? Diet  How many ounces of calorie-free liquids did you consume each day? 64 or more oz    How many meal replacements did you take each day?  LCD  2 replacement one meal    Did you have any problems adhering to the program?  no If yes, please explain:      Exercise  Aerobic exercise: 40 min 3 times a week at home full body   Resistance exercise: 3 workouts / week  Any discomfort?  no     If yes, where? Max 321 2019  Now 212   Doing well overall  Objective  Visit Vitals  /69 (BP 1 Location: Right arm, BP Patient Position: Sitting, BP Cuff Size: Adult)   Pulse (!) 57   Temp 97.9 °F (36.6 °C) (Oral)   Resp 18   Ht 5' 8\" (1.727 m)   Wt 212 lb 1.6 oz (96.2 kg)   SpO2 98%   BMI 32.25 kg/m²     No LMP for male patient. Physical Exam  Appearance: well,   Mental:A&O x 3, NAD  H:NC/AT,  EENT:   EOMI, PERRL, No scleral icterus  Neck: no bruit or JVD  Lung: clear, No W/R  ABD: soft, active, nontender  Ext:  no Edema  Neuro: nonfocal  Assessment / Plan    Encounter Diagnoses   Name Primary?  Obesity (BMI 30-39. 9) Yes    GUDELIA (obstructive sleep apnea)     Hypercholesterolemia      Diagnoses and all orders for this visit:    1. Obesity (BMI 30-39. 9)  Cont wellbutrin sr 100 mg bid  No change in meds needed  He does well with the exercise and the sleep  He has lost 109 lbs  2. GUDELIA (obstructive sleep apnea)  Using cpap  3. Hypercholesterolemia  The cholesterol has normalized since 2020  The lifestyle changes are the treatment plan  1. Weight management stable   Progress was reviewed with patient    2. Labs    Latest results reviewed with patient       face to face time with Chaitanya Nelson consisted of counseling & coordinating and/or discussing treatment plans in reference to his obesity The primary encounter diagnosis was Obesity (BMI 30-39.9). Diagnoses of GUDELIA (obstructive sleep apnea) and Hypercholesterolemia were also pertinent to this visit.

## 2022-05-10 NOTE — PROGRESS NOTES
Weight Management. 1 month follow up. 1. Have you been to the ER, urgent care clinic since your last visit? Hospitalized since your last visit? No    2. Have you seen or consulted any other health care providers outside of the [address] [place] [address] since your last visit? Include any pap smears or colon screening.  No     BMI - 32.2

## 2022-05-12 ENCOUNTER — OFFICE VISIT (OUTPATIENT)
Dept: SURGERY | Age: 37
End: 2022-05-12

## 2022-05-12 DIAGNOSIS — E66.9 OBESITY (BMI 30-39.9): Primary | ICD-10-CM

## 2022-05-19 ENCOUNTER — OFFICE VISIT (OUTPATIENT)
Dept: SURGERY | Age: 37
End: 2022-05-19

## 2022-05-19 DIAGNOSIS — E66.9 OBESITY (BMI 30-39.9): Primary | ICD-10-CM

## 2022-05-19 NOTE — PROGRESS NOTES
[facility]  Metabolic Weight Loss Program        Patient's Name: Jackie Nice. : 1985    This patient is enrolled in [address] [place] [address] Weight Loss Program and attended the required weekly virtual nutrition class hosted via [employer].       Brandin Santana, MS, RD, LDN

## 2022-05-23 ENCOUNTER — CLINICAL SUPPORT (OUTPATIENT)
Dept: SURGERY | Age: 37
End: 2022-05-23

## 2022-05-23 VITALS
DIASTOLIC BLOOD PRESSURE: 74 MMHG | WEIGHT: 209.4 LBS | OXYGEN SATURATION: 100 % | TEMPERATURE: 98.1 F | HEIGHT: 68 IN | SYSTOLIC BLOOD PRESSURE: 112 MMHG | HEART RATE: 76 BPM | BODY MASS INDEX: 31.74 KG/M2

## 2022-05-23 DIAGNOSIS — E78.00 HYPERCHOLESTEROLEMIA: ICD-10-CM

## 2022-05-23 DIAGNOSIS — G47.33 OSA (OBSTRUCTIVE SLEEP APNEA): ICD-10-CM

## 2022-05-23 DIAGNOSIS — E66.9 OBESITY (BMI 30-39.9): Primary | ICD-10-CM

## 2022-05-23 NOTE — PROGRESS NOTES
1. Have you been to the ER, urgent care clinic since your last visit? Hospitalized since your last visit? No    2. Have you seen or consulted any other health care providers outside of the [address] [place] [address] since your last visit? Include any pap smears or colon screening. No     05/09/2022      Progress Note: Weekly Education Class in the [facility] Program         Patient is on Very Low Calorie Diet [] (4 meal replacements per day, 800 kcal/day)      Low Calorie Diet [x] (2-3 meal replacements per day, 7697-9752 kcal/day)    1) Did patient have any new symptoms or physical problems? Yes []    No [x]    If yes, check & comment: weakness [], fatigue [], lightheadedness [], headache [], cramps [], cold intolerance [], hair loss [], diarrhea [], constipation [],  NA [] other:     2) Has patient had any medical attention from other providers, urgent care or the emergency room this week? Yes []  No [x]       NA [], If yes, why:     3) Any other sugar sweetened beverages consumed this week? Yes []  No [x]    4) Did patient have any problems adhering to the diet? Yes []  No [x] NA []    If yes, Vacation [], Celebrations [], Conferences [], Family Reunions [] other:     5) How many hours of sleep this week?     (range)5-8 NA []    Number of meal replacements consumed daily? 2 (range)  NA []    Average ounces of water patient consumed daily this week (not including shakes)? 80 (divide the weekly total by 7)    Did you eat any food outside of the program? Yes [x] No []    Physical Activity Over the Past Week:    Cardio exercise: 25 min  Strength exercise: 2 workouts / week  Number of steps walked per day: n/a    How has patient mood overall been this week?  Sad [], Happy [], Stressed [], Tired [], Content [], NA [x], other            Medications reconciled by nurse Yes [x]  No[]    Patient was given therapeutic recommendations for any noted side effects of their dietary approach based upon [facility] patient manual per providers recommendation. 05/16/2022      Progress Note: Weekly Education Class in the [facility] Weight Loss Program         Patient is on Very Low Calorie Diet [] (4 meal replacements per day, 800 kcal/day)      Low Calorie Diet [x] (2-3 meal replacements per day, 3246-3597 kcal/day)    1) Did patient have any new symptoms or physical problems? Yes []    No [x]    If yes, check & comment: weakness [], fatigue [], lightheadedness [], headache [], cramps [], cold intolerance [], hair loss [], diarrhea [], constipation [],  NA [] other:     2) Has patient had any medical attention from other providers, urgent care or the emergency room this week? Yes []  No [x]       NA [], If yes, why:     3) Any other sugar sweetened beverages consumed this week? Yes []  No [x]    4) Did patient have any problems adhering to the diet? Yes []  No [x] NA []    If yes, Vacation [], Celebrations [], Conferences [], Family Reunions [] other:     5) How many hours of sleep this week? 5-8   (range)  NA []    Number of meal replacements consumed daily? 2 (range)  NA []    Average ounces of water patient consumed daily this week (not including shakes)? 80    (divide the weekly total by 7)    Did you eat any food outside of the program? Yes [] No [x]    Physical Activity Over the Past Week:    Cardio exercise: 30 min  Strength exercise: 2 workouts / week  Number of steps walked per day: n/a    How has patient mood overall been this week? Sad [], Happy [], Stressed [], Tired [], Content [], NA [x], other            Medications reconciled by nurse Yes [x]  No[]    Patient was given therapeutic recommendations for any noted side effects of their dietary approach based upon [facility] patient manual per providers recommendation.

## 2022-05-26 ENCOUNTER — OFFICE VISIT (OUTPATIENT)
Dept: SURGERY | Age: 37
End: 2022-05-26

## 2022-05-26 DIAGNOSIS — E66.9 OBESITY (BMI 30-39.9): Primary | ICD-10-CM

## 2022-05-26 NOTE — PROGRESS NOTES
[facility]  Metabolic Weight Loss Program        Patient's Name: Jackie Nice. : 1985    This patient is enrolled in [address] [place] [address] Weight Loss Program and attended the required weekly virtual nutrition class hosted via [address] [place] [address] today.       Brandin Santana, MS, RD, LDN

## 2022-05-29 NOTE — PROGRESS NOTES
[facility]  Metabolic Weight Loss Program        Patient's Name: Angela Jacobs. : 1985    This patient is enrolled in [address] [place] [address] Weight Loss Program and attended the required weekly virtual nutrition class hosted via [employer].       Andreina Kowalski, MS, RD, LDN

## 2022-05-29 NOTE — PROGRESS NOTES
[facility]  Metabolic Weight Loss Program        Patient's Name: Marquita Issa. : 1985    This patient is enrolled in [address] [place] [address] Weight Loss Program and attended the required weekly virtual nutrition class hosted via [employer].       Promise Wilkes MS, RD, LDN

## 2022-06-01 NOTE — PROGRESS NOTES
Nurse note from patient's weekly / LCD / Maintenance class was reviewed. Pertinent medical concerns were:   Down 3 lb     BP Readings from Last 3 Encounters:   05/23/22 112/74   05/10/22 107/69   04/25/22 100/64       Weight Metrics 5/23/2022 5/10/2022 5/10/2022 4/25/2022 4/12/2022 4/12/2022 3/28/2022   Weight 209 lb 6.4 oz - 212 lb 1.6 oz 211 lb 4.8 oz - 214 lb 14.4 oz 219 lb 11.2 oz   Neck Circ (inches) - 14.25 - - 14.75 - -   Waist Measure Inches - 39.25 - - 39.75 - -   Body Fat % - 27.4 - - 27.6 - -   BMI 31.84 kg/m2 - 32.25 kg/m2 32.13 kg/m2 - 32.68 kg/m2 33.41 kg/m2       Current Outpatient Medications   Medication Sig Dispense Refill    buPROPion SR (WELLBUTRIN SR) 100 mg SR tablet Take 1 Tablet by mouth two (2) times a day. 60 Tablet 2    multivitamins chew Take 2 Each by mouth daily.  Vitafsion Mens Daily

## 2022-06-02 ENCOUNTER — OFFICE VISIT (OUTPATIENT)
Dept: SURGERY | Age: 37
End: 2022-06-02

## 2022-06-02 DIAGNOSIS — E66.9 OBESITY (BMI 30-39.9): Primary | ICD-10-CM

## 2022-06-03 NOTE — PROGRESS NOTES
[facility]  Metabolic Weight Loss Program        Patient's Name: Roberto Pruett. : 1985    This patient is enrolled in [address] [place] [address] Weight Loss Program and attended the required weekly virtual nutrition class hosted via [employer] today.       Villa Khoury, MS, RD, LDN

## 2022-06-07 ENCOUNTER — OFFICE VISIT (OUTPATIENT)
Dept: SURGERY | Age: 37
End: 2022-06-07
Payer: COMMERCIAL

## 2022-06-07 VITALS
TEMPERATURE: 98.1 F | SYSTOLIC BLOOD PRESSURE: 108 MMHG | RESPIRATION RATE: 16 BRPM | WEIGHT: 211.1 LBS | HEIGHT: 68 IN | HEART RATE: 67 BPM | BODY MASS INDEX: 31.99 KG/M2 | OXYGEN SATURATION: 100 % | DIASTOLIC BLOOD PRESSURE: 62 MMHG

## 2022-06-07 DIAGNOSIS — G47.33 OSA (OBSTRUCTIVE SLEEP APNEA): ICD-10-CM

## 2022-06-07 DIAGNOSIS — Z11.59 NEED FOR HEPATITIS C SCREENING TEST: ICD-10-CM

## 2022-06-07 DIAGNOSIS — E66.9 OBESITY (BMI 30.0-34.9): ICD-10-CM

## 2022-06-07 DIAGNOSIS — E66.09 CLASS 1 OBESITY DUE TO EXCESS CALORIES WITH SERIOUS COMORBIDITY IN ADULT, UNSPECIFIED BMI: Primary | ICD-10-CM

## 2022-06-07 DIAGNOSIS — E78.00 HYPERCHOLESTEROLEMIA: ICD-10-CM

## 2022-06-07 PROCEDURE — 99213 OFFICE O/P EST LOW 20 MIN: CPT | Performed by: FAMILY MEDICINE

## 2022-06-07 NOTE — PROGRESS NOTES
[facility] Progress Note:   F/up Physician Visit    CC: Weight Management      Compa Grier is a 44 y.o. male who is here for his  f/up physician visit for the LCD Program.    Weight Metrics 6/7/2022 6/7/2022 5/23/2022 5/10/2022 5/10/2022 4/25/2022 4/12/2022   Weight - 211 lb 1.6 oz 209 lb 6.4 oz - 212 lb 1.6 oz 211 lb 4.8 oz -   Neck Circ (inches) 14 - - 14.25 - - 14.75   Waist Measure Inches 38 - - 39.25 - - 39.75   Body Fat % 27.1 - - 27.4 - - 27.6   BMI - 32.1 kg/m2 31.84 kg/m2 - 32.25 kg/m2 32.13 kg/m2 -         Outpatient Medications Marked as Taking for the 6/7/22 encounter (Office Visit) with Gurdeep Reynolds MD   Medication Sig Dispense Refill    buPROPion SR [facility] SR) 100 mg SR tablet Take 1 Tablet by mouth two (2) times a day. 60 Tablet 2    multivitamins chew Take 2 Each by mouth daily. Vitafsion Mens Daily            Participation   Did you attend clinic and class last week? yes    Review of Systems  Since your last visit, have you experienced any complications? no  If yes, please list:     Are you taking an appetite suppressant? yes  If so, is there any Chest Pain, Palpitations or Dizziness? HUNGER CONTROL: good    BP Readings from Last 3 Encounters:   06/07/22 108/62   05/23/22 112/74   05/10/22 107/69       SLEEP:7 avg    Have you received any other medical care this week? no  If yes, where and for what? Have you discontinued or changed any medicine or dose of your medicine since your last visit with Dr Alfreda Parkinson? no  If yes, where and for what? Diet  How many ounces of calorie-free liquids did you consume each day? 64 at least oz    How many meal replacements did you take each day? 2 and a meal    Did you have any problems adhering to the program?  no If yes, please explain:      Exercise  Aerobic exercise: 15-20 min  Resistance exercise: 4 workouts / week  Any discomfort?  no     If yes, where?       Objective  Visit Vitals  /62 (BP 1 Location: Left arm, BP Patient Position: Sitting, BP Cuff Size: Large adult)   Pulse 67   Temp 98.1 °F (36.7 °C)   Resp 16   Ht 5' 8\" (1.727 m)   Wt 211 lb 1.6 oz (95.8 kg)   SpO2 100%   BMI 32.10 kg/m²     No LMP for male patient. Physical Exam  Appearance: well,   Mental:A&O x 3, NAD  H:NC/AT,  EENT:   EOMI, PERRL, No scleral icterus  Neck: no bruit or JVD  Lung: clear, No W/R  ABD: soft, active, nontender  Ext:  no Edema  Neuro: nonfocal  Assessment / Plan    Encounter Diagnoses   Name Primary?  Class 1 obesity due to excess calories with serious comorbidity in adult, unspecified BMI Yes    Obesity (BMI 30.0-34. 9)     GUDELIA (obstructive sleep apnea)     Hypercholesterolemia     Need for hepatitis C screening test      Diagnoses and all orders for this visit:    1. Class 1 obesity due to excess calories with serious comorbidity in adult, unspecified BMI  -       -     METABOLIC PANEL, COMPREHENSIVE; Future    2. Obesity (BMI 30.0-34.9)    3. GUDELIA (obstructive sleep apnea)  Using cpap  4. Hypercholesterolemia  -     LIPID PANEL; Future    5. Need for screening for hepatitis C  -     HEPATITIS C AB; Future      1. Weight management well controlled   Progress was reviewed with patient    2. Labs    Latest results reviewed with patient       face to face time with Abdi Escobedo consisted of counseling & coordinating and/or discussing treatment plans in reference to his obesity The primary encounter diagnosis was Class 1 obesity due to excess calories with serious comorbidity in adult, unspecified BMI. Diagnoses of Obesity (BMI 30.0-34.9), GUDELIA (obstructive sleep apnea), Hypercholesterolemia, and Need for hepatitis C screening test were also pertinent to this visit.

## 2022-06-07 NOTE — PROGRESS NOTES
1. Have you been to the ER, urgent care clinic since your last visit? Hospitalized since your last visit? No    2. Have you seen or consulted any other health care providers outside of the [address] [place] [address] since your last visit? Include any pap smears or colon screening. No   05/23/2022      Progress Note: Weekly Education Class in the [facility] Loss Program         Patient is on Very Low Calorie Diet [] (4 meal replacements per day, 800 kcal/day)      Low Calorie Diet [x] (2-3 meal replacements per day, 0346-4872 kcal/day)    1) Did patient have any new symptoms or physical problems? Yes []    No [x]    If yes, check & comment: weakness [], fatigue [], lightheadedness [], headache [], cramps [], cold intolerance [], hair loss [], diarrhea [], constipation [],  NA [] other:             2) Has patient had any medical attention from other providers, urgent care or the emergency room this week? Yes []  No [x]       NA [], If yes, why:            3) Any other sugar sweetened beverages consumed this week? Yes []  No [x]    4) Did patient have any problems adhering to the diet? Yes []  No [] NA [x]    If yes, Vacation [], Celebrations [], Conferences [], Family Reunions [] other:          5) How many hours of sleep this week?     (range) 5-8 NA []    Number of meal replacements consumed daily? 2 (range)  NA []    Average ounces of water patient consumed daily this week (not including shakes)? 80  (divide the weekly total by 7)    Did you eat any food outside of the program? Yes [] No [x]    Physical Activity Over the Past Week:    Cardio exercise:30  min  Strength exercise:1  workouts / week  Number of steps walked per day: n/a    How has patient mood overall been this week?  Sad [], Happy [], Stressed [], Tired [], Content [], NA [x], other            Medications reconciled by nurse Yes [x]  No[]    Patient was given therapeutic recommendations for any noted side effects of their dietary approach based upon [facility] patient manual per providers recommendation. 05/30/2022      Progress Note: Weekly Education Class in the [facility] Weight Loss Program         Patient is on Very Low Calorie Diet [] (4 meal replacements per day, 800 kcal/day)      Low Calorie Diet [x] (2-3 meal replacements per day, 0233-6193 kcal/day)    1) Did patient have any new symptoms or physical problems? Yes []    No [x]    If yes, check & comment: weakness [], fatigue [], lightheadedness [], headache [], cramps [], cold intolerance [], hair loss [], diarrhea [], constipation [],  NA [] other:                  2) Has patient had any medical attention from other providers, urgent care or the emergency room this week? Yes []  No [x]       NA [], If yes, why:            3) Any other sugar sweetened beverages consumed this week? Yes []  No [x]    4) Did patient have any problems adhering to the diet? Yes []  No [x] NA []    If yes, Vacation [], Celebrations [], Conferences [], Family Reunions [] other:               5) How many hours of sleep this week?     (range) 5-8 NA []    Number of meal replacements consumed daily? 2 (range)  NA []    Average ounces of water patient consumed daily this week (not including shakes)? 80  (divide the weekly total by 7)    Did you eat any food outside of the program? Yes [] No [x]    Physical Activity Over the Past Week:    Cardio exercise: 30 min  Strength exercise: 2 workouts / week  Number of steps walked per day: n/a    How has patient mood overall been this week? Sad [], Happy [], Stressed [], Tired [], Content [], NA [x], other            Medications reconciled by nurse Yes [x]  No[]    Patient was given therapeutic recommendations for any noted side effects of their dietary approach based upon [facility] patient manual per providers recommendation.

## 2022-06-09 ENCOUNTER — OFFICE VISIT (OUTPATIENT)
Dept: SURGERY | Age: 37
End: 2022-06-09

## 2022-06-09 DIAGNOSIS — E66.09 CLASS 1 OBESITY DUE TO EXCESS CALORIES WITH SERIOUS COMORBIDITY IN ADULT, UNSPECIFIED BMI: Primary | ICD-10-CM

## 2022-06-20 ENCOUNTER — CLINICAL SUPPORT (OUTPATIENT)
Dept: SURGERY | Age: 37
End: 2022-06-20

## 2022-06-20 VITALS
TEMPERATURE: 97.7 F | RESPIRATION RATE: 18 BRPM | HEART RATE: 52 BPM | BODY MASS INDEX: 31.61 KG/M2 | DIASTOLIC BLOOD PRESSURE: 72 MMHG | WEIGHT: 208.6 LBS | OXYGEN SATURATION: 98 % | SYSTOLIC BLOOD PRESSURE: 109 MMHG | HEIGHT: 68 IN

## 2022-06-20 DIAGNOSIS — E78.00 HYPERCHOLESTEROLEMIA: ICD-10-CM

## 2022-06-20 DIAGNOSIS — E66.09 CLASS 1 OBESITY DUE TO EXCESS CALORIES WITH SERIOUS COMORBIDITY IN ADULT, UNSPECIFIED BMI: Primary | ICD-10-CM

## 2022-06-20 DIAGNOSIS — G47.33 OSA (OBSTRUCTIVE SLEEP APNEA): ICD-10-CM

## 2022-06-20 NOTE — PROGRESS NOTES
6/6/2022    Progress Note: Weekly Education Class in the [facility] Loss Program         Patient is on Very Low Calorie Diet [] (4 meal replacements per day, 800 kcal/day)      Low Calorie Diet [x] (2-3 meal replacements per day, 0405-9164 kcal/day)    1) Did patient have any new symptoms or physical problems? Yes []    No [x]    If yes, check & comment: weakness [], fatigue [], lightheadedness [], headache [], cramps [], cold intolerance [], hair loss [], diarrhea [], constipation [],  NA [] other:                                 2) Has patient had any medical attention from other providers, urgent care or the emergency room this week? Yes []  No [x]       NA [], If yes, why:                                       3) Any other sugar sweetened beverages consumed this week? Yes []  No [x]    4) Did patient have any problems adhering to the diet? Yes []  No [x] NA []    If yes, Vacation [], Celebrations [], Conferences [], Family Reunions [] other:                                                5) How many hours of sleep this week? 5-8    (range)  NA []    Number of meal replacements consumed daily? 2 (range)  NA []    Average ounces of water patient consumed daily this week (not including shakes)? 80     (divide the weekly total by 7)    Did you eat any food outside of the program? Yes [x] No []    Physical Activity Over the Past Week:    Cardio exercise: 0 min  Strength exercise: 3 workouts / week  Number of steps walked per day: 0    How has patient mood overall been this week? Sad [], Happy [], Stressed [], Tired [], Content [], NA [], other   NOT ANSWERED             Medications reconciled by nurse Yes [x]  No[]    Patient was given therapeutic recommendations for any noted side effects of their dietary approach based upon [facility] patient manual per providers recommendation.      6/13/2022    Progress Note: Weekly Education Class in the [facility] Loss [facility]         Patient is on Very Low Calorie Diet [] (4 meal replacements per day, 800 kcal/day)      Low Calorie Diet [x] (2-3 meal replacements per day, 8830-5269 kcal/day)    1) Did patient have any new symptoms or physical problems? Yes []    No [x]    If yes, check & comment: weakness [], fatigue [], lightheadedness [], headache [], cramps [], cold intolerance [], hair loss [], diarrhea [], constipation [],  NA [] other:                                 2) Has patient had any medical attention from other providers, urgent care or the emergency room this week? Yes []  No [x]       NA [], If yes, why:                                      3) Any other sugar sweetened beverages consumed this week? Yes []  No [x]    4) Did patient have any problems adhering to the diet? Yes []  No [x] NA []    If yes, Vacation [], Celebrations [], Conferences [], Family Reunions [] other:                                                5) How many hours of sleep this week? 5-8    (range)  NA []    Number of meal replacements consumed daily? 2  (range)  NA []    Average ounces of water patient consumed daily this week (not including shakes)? 80     (divide the weekly total by 7)    Did you eat any food outside of the program? Yes [x] No []    Physical Activity Over the Past Week:    Cardio exercise: 0 min  Strength exercise: 2 workouts / week  Number of steps walked per day: 0    How has patient mood overall been this week? Sad [], Happy [], Stressed [], Tired [], Content [], NA [], other   NOT ANSWERED             Medications reconciled by nurse Yes [x]  No[]    Patient was given therapeutic recommendations for any noted side effects of their dietary approach based upon [facility] patient manual per providers recommendation.

## 2022-06-20 NOTE — PROGRESS NOTES
Weight Management. 1. Have you been to the ER, urgent care clinic since your last visit? Hospitalized since your last visit? No    2. Have you seen or consulted any other health care providers outside of the [address] [place] [address] since your last visit? Include any pap smears or colon screening.  No

## 2022-06-21 LAB
ALBUMIN SERPL-MCNC: 4.5 G/DL (ref 4–5)
ALBUMIN/GLOB SERPL: 1.5 {RATIO} (ref 1.2–2.2)
ALP SERPL-CCNC: 72 IU/L (ref 44–121)
ALT SERPL-CCNC: 13 IU/L (ref 0–44)
AST SERPL-CCNC: 16 IU/L (ref 0–40)
BILIRUB SERPL-MCNC: 0.7 MG/DL (ref 0–1.2)
BUN SERPL-MCNC: 13 MG/DL (ref 6–20)
BUN/CREAT SERPL: 16 (ref 9–20)
CALCIUM SERPL-MCNC: 9.4 MG/DL (ref 8.7–10.2)
CHLORIDE SERPL-SCNC: 104 MMOL/L (ref 96–106)
CHOLEST SERPL-MCNC: 158 MG/DL (ref 100–199)
CO2 SERPL-SCNC: 25 MMOL/L (ref 20–29)
CREAT SERPL-MCNC: 0.79 MG/DL (ref 0.76–1.27)
EGFR: 117 ML/MIN/1.73
GLOBULIN SER CALC-MCNC: 3 G/DL (ref 1.5–4.5)
GLUCOSE SERPL-MCNC: 79 MG/DL (ref 65–99)
HCV AB S/CO SERPL IA: <0.1 S/CO RATIO (ref 0–0.9)
HDLC SERPL-MCNC: 55 MG/DL
LDLC SERPL CALC-MCNC: 93 MG/DL (ref 0–99)
POTASSIUM SERPL-SCNC: 4.4 MMOL/L (ref 3.5–5.2)
PROT SERPL-MCNC: 7.5 G/DL (ref 6–8.5)
SODIUM SERPL-SCNC: 141 MMOL/L (ref 134–144)
TRIGL SERPL-MCNC: 45 MG/DL (ref 0–149)
VLDLC SERPL CALC-MCNC: 10 MG/DL (ref 5–40)

## 2022-06-23 ENCOUNTER — OFFICE VISIT (OUTPATIENT)
Dept: SURGERY | Age: 37
End: 2022-06-23

## 2022-06-23 DIAGNOSIS — E66.09 CLASS 1 OBESITY DUE TO EXCESS CALORIES WITH SERIOUS COMORBIDITY IN ADULT, UNSPECIFIED BMI: Primary | ICD-10-CM

## 2022-06-24 NOTE — PROGRESS NOTES
[facility]  Metabolic Weight Loss Program        Patient's Name: Da Swan. : 1985    This patient is enrolled in [address] [place] [address] Weight Loss Program and attended the required weekly virtual nutrition class hosted via [employer].       Shaq Busch MS, RD, LDN

## 2022-06-24 NOTE — PROGRESS NOTES
[facility]  Metabolic Weight Loss Program        Patient's Name: Ke Douglas. : 1985    This patient is enrolled in [address] [place] [address] Weight Loss Program and attended the required weekly virtual nutrition class hosted via [employer] today.       Nuno Downs, MS, RD, LDN

## 2022-07-01 NOTE — PROGRESS NOTES
The liver and kidney and electrolyte tests are normal  The cholesterol levels have improved and are now normal  [facility] job!

## 2022-07-01 NOTE — PROGRESS NOTES
Nurse note from patient's weekly  LCD /  class was reviewed. Pertinent medical concerns were: Has made great progress     BP Readings from Last 3 Encounters:   06/20/22 109/72   06/07/22 108/62   05/23/22 112/74       Weight Metrics 6/20/2022 6/7/2022 6/7/2022 5/23/2022 5/10/2022 5/10/2022 4/25/2022   Weight 208 lb 9.6 oz - 211 lb 1.6 oz 209 lb 6.4 oz - 212 lb 1.6 oz 211 lb 4.8 oz   Neck Circ (inches) - 14 - - 14.25 - -   Waist Measure Inches - 38 - - 39.25 - -   Body Fat % - 27.1 - - 27.4 - -   BMI 31.72 kg/m2 - 32.1 kg/m2 31.84 kg/m2 - 32.25 kg/m2 32.13 kg/m2       Current Outpatient Medications   Medication Sig Dispense Refill    buPROPion SR (WELLBUTRIN SR) 100 mg SR tablet Take 1 Tablet by mouth two (2) times a day. 60 Tablet 2    multivitamins chew Take 2 Each by mouth daily.  Vitafsion Mens Daily

## 2022-07-12 ENCOUNTER — OFFICE VISIT (OUTPATIENT)
Dept: SURGERY | Age: 37
End: 2022-07-12
Payer: COMMERCIAL

## 2022-07-12 VITALS
TEMPERATURE: 98.5 F | BODY MASS INDEX: 31.01 KG/M2 | SYSTOLIC BLOOD PRESSURE: 101 MMHG | RESPIRATION RATE: 18 BRPM | OXYGEN SATURATION: 97 % | HEART RATE: 62 BPM | DIASTOLIC BLOOD PRESSURE: 67 MMHG | HEIGHT: 68 IN | WEIGHT: 204.6 LBS

## 2022-07-12 DIAGNOSIS — E66.09 CLASS 1 OBESITY DUE TO EXCESS CALORIES WITH SERIOUS COMORBIDITY IN ADULT, UNSPECIFIED BMI: Primary | ICD-10-CM

## 2022-07-12 DIAGNOSIS — L30.9 ECZEMA, UNSPECIFIED TYPE: ICD-10-CM

## 2022-07-12 DIAGNOSIS — G47.33 OSA (OBSTRUCTIVE SLEEP APNEA): ICD-10-CM

## 2022-07-12 DIAGNOSIS — E78.00 HYPERCHOLESTEROLEMIA: ICD-10-CM

## 2022-07-12 PROCEDURE — 99214 OFFICE O/P EST MOD 30 MIN: CPT | Performed by: FAMILY MEDICINE

## 2022-07-12 RX ORDER — FLUOCINONIDE 0.5 MG/G
CREAM TOPICAL 2 TIMES DAILY
Qty: 15 G | Refills: 0 | Status: SHIPPED | OUTPATIENT
Start: 2022-07-12

## 2022-07-12 NOTE — PROGRESS NOTES
Weight Management. 1 month follow up. 1. Have you been to the ER, urgent care clinic since your last visit? Hospitalized since your last visit? No    2. Have you seen or consulted any other health care providers outside of the [address] [place] [address] since your last visit? Include any pap smears or colon screening.  No     BMI - 31.0

## 2022-07-12 NOTE — PROGRESS NOTES
[facility] Progress Note:   F/up Physician Visit    CC: Weight Management      Volodymyr Luna is a 44 y.o. male who is here for his  f/up physician visit for the LCD Program.    Has a rash on the back of both lower legs  Does not itch  He does not recall anything in his daily routine that he could be laying his legs against  Has a h/o childhood eczema      Weight Metrics 7/25/2022 7/12/2022 7/12/2022 6/20/2022 6/7/2022 6/7/2022 5/23/2022   Weight 204 lb 1.6 oz - 204 lb 9.6 oz 208 lb 9.6 oz - 211 lb 1.6 oz 209 lb 6.4 oz   Neck Circ (inches) - 14.5 - - 14 - -   Waist Measure Inches - 38.5 - - 38 - -   Body Fat % - 27.8 - - 27.1 - -   BMI 31.03 kg/m2 - 31.11 kg/m2 31.72 kg/m2 - 32.1 kg/m2 31.84 kg/m2         Outpatient Medications Marked as Taking for the 7/12/22 encounter (Office Visit) with Angela Leung MD   Medication Sig Dispense Refill    fluocinoNIDE (LIDEX) 0.05 % topical cream Apply  to affected area two (2) times a day. 15 g 0    buPROPion SR (WELLBUTRIN SR) 100 mg SR tablet Take 1 Tablet by mouth two (2) times a day. 60 Tablet 2    multivitamins chew Take 2 Each by mouth daily. Vitafsion Mens Daily            Participation   Did you attend clinic and class last week? yes    Review of Systems  Since your last visit, have you experienced any complications? no  If yes, please list:     Are you taking an appetite suppressant? no  If so, is there any Chest Pain, Palpitations or Dizziness? HUNGER CONTROL: good    BP Readings from Last 3 Encounters:   07/25/22 106/66   07/12/22 101/67   06/20/22 109/72       SLEEP:7    Have you received any other medical care this week? no  If yes, where and for what? Have you discontinued or changed any medicine or dose of your medicine since your last visit with Dr Kisha Yusuf? no  If yes, where and for what?          Diet  How many ounces of calorie-free liquids did you consume each day?  80 oz    How many meal replacements did you take each day? 2 and a meal    Did you have any problems adhering to the program?  no If yes, please explain:      Exercise  Aerobic exercise: 60 min x3-4  Resistance exercise: 3 -4workouts / week  Any discomfort?  no     If yes, where? Objective  Visit Vitals  /67 (BP 1 Location: Right arm, BP Patient Position: Sitting, BP Cuff Size: Adult long)   Pulse 62   Temp 98.5 °F (36.9 °C) (Oral)   Resp 18   Ht 5' 8\" (1.727 m)   Wt 204 lb 9.6 oz (92.8 kg)   SpO2 97%   BMI 31.11 kg/m²     No LMP for male patient. Physical Exam  Appearance: well,   Mental:A&O x 3, NAD  H:NC/AT,  EENT:   EOMI, PERRL, No scleral icterus  Neck: no bruit or JVD  Lung: clear, No W/R  ABD: soft, active, nontender  Ext:  no Edema  Neuro: nonfocal  Assessment / Plan    Encounter Diagnoses   Name Primary? Class 1 obesity due to excess calories with serious comorbidity in adult, unspecified BMI Yes    Eczema, unspecified type     GUDELIA (obstructive sleep apnea)     Hypercholesterolemia      Diagnoses and all orders for this visit:    1. Class 1 obesity due to excess calories with serious comorbidity in adult, unspecified BMI  Try to be more consistent 4-5 days of exercise  Cont wellbutrin 100 mg bid  Cont using cpap  2. Eczema, unspecified type  -     fluocinoNIDE (LIDEX) 0.05 % topical cream; Apply  to affected area two (2) times a day. 3. GUDELIA (obstructive sleep apnea)  Cont use of cpap  4. Hypercholesterolemia  Cont to monitor, most recent results were normal    1. Weight management improved   Progress was reviewed with patient    2. Labs    Latest results reviewed with patient       face to face time with Juan J Ro consisted of counseling & coordinating and/or discussing treatment plans in reference to his obesity The primary encounter diagnosis was Class 1 obesity due to excess calories with serious comorbidity in adult, unspecified BMI.  Diagnoses of Eczema, unspecified type, GUDELIA (obstructive sleep apnea), and Hypercholesterolemia were also pertinent to this visit.

## 2022-07-12 NOTE — PROGRESS NOTES
6/27/2022    Progress Note: Weekly Education Class in the [facility] Weight Loss Program         Patient is on Very Low Calorie Diet [] (4 meal replacements per day, 800 kcal/day)      Low Calorie Diet [x] (2-3 meal replacements per day, 5308-0912 kcal/day)    1) Did patient have any new symptoms or physical problems? Yes []    No [x]    If yes, check & comment: weakness [], fatigue [], lightheadedness [], headache [], cramps [], cold intolerance [], hair loss [], diarrhea [], constipation [],  NA [] other:                                 2) Has patient had any medical attention from other providers, urgent care or the emergency room this week? Yes []  No [x]       NA [], If yes, why:                                       3) Any other sugar sweetened beverages consumed this week? Yes []  No [x]    4) Did patient have any problems adhering to the diet? Yes []  No [x] NA []    If yes, Vacation [], Celebrations [], Conferences [], Family Reunions [] other:                                                5) How many hours of sleep this week? 5-7    (range)  NA []    Number of meal replacements consumed daily? 2  (range)  NA []    Average ounces of water patient consumed daily this week (not including shakes)? 80     (divide the weekly total by 7)    Did you eat any food outside of the program? Yes [x] No []    Physical Activity Over the Past Week:    Cardio exercise: 0 min  Strength exercise: 3 workouts / week  Number of steps walked per day: 0    How has patient mood overall been this week? Sad [], Happy [], Stressed [], Tired [], Content [], NA [], other   NOT ANSWERED             Medications reconciled by nurse Yes [x]  No[]    Patient was given therapeutic recommendations for any noted side effects of their dietary approach based upon [facility] patient manual per providers recommendation.      7/4/2022    Progress Note: Weekly Education Class in the [facility] Weight Loss Program         Patient is on Very Low Calorie Diet [] (4 meal replacements per day, 800 kcal/day)      Low Calorie Diet [x] (2-3 meal replacements per day, 5114-7180 kcal/day)    1) Did patient have any new symptoms or physical problems? Yes []    No [x]    If yes, check & comment: weakness [], fatigue [], lightheadedness [], headache [], cramps [], cold intolerance [], hair loss [], diarrhea [], constipation [],  NA [] other:                                 2) Has patient had any medical attention from other providers, urgent care or the emergency room this week? Yes []  No [x]       NA [], If yes, why:                                       3) Any other sugar sweetened beverages consumed this week? Yes []  No [x]    4) Did patient have any problems adhering to the diet? Yes []  No [x] NA []    If yes, Vacation [], Celebrations [], Conferences [], Family Reunions [] other:                                                5) How many hours of sleep this week? 4-8    (range)  NA []    Number of meal replacements consumed daily? 2  (range)  NA []    Average ounces of water patient consumed daily this week (not including shakes)? 80     (divide the weekly total by 7)    Did you eat any food outside of the program? Yes [x] No []    Physical Activity Over the Past Week:    Cardio exercise: 15 min  Strength exercise: 3 workouts / week  Number of steps walked per day: 0    How has patient mood overall been this week? Sad [], Happy [], Stressed [], Tired [], Content [], NA [], other   NOT ANSWERED             Medications reconciled by nurse Yes [x]  No[]    Patient was given therapeutic recommendations for any noted side effects of their dietary approach based upon [facility] patient manual per providers recommendation.

## 2022-07-14 ENCOUNTER — OFFICE VISIT (OUTPATIENT)
Dept: SURGERY | Age: 37
End: 2022-07-14

## 2022-07-14 DIAGNOSIS — E66.09 CLASS 1 OBESITY DUE TO EXCESS CALORIES WITH SERIOUS COMORBIDITY IN ADULT, UNSPECIFIED BMI: Primary | ICD-10-CM

## 2022-07-14 NOTE — PROGRESS NOTES
[facility]  Metabolic Weight Loss Program        Patient's Name: Татьяна Garza. : 1985    This patient is enrolled in [address] [place] [address] Weight Loss Program and attended the required weekly virtual nutrition class hosted via [employer].       Mackenzie Morrison MS, RD, LDN

## 2022-07-25 ENCOUNTER — CLINICAL SUPPORT (OUTPATIENT)
Dept: SURGERY | Age: 37
End: 2022-07-25

## 2022-07-25 VITALS
TEMPERATURE: 97.8 F | SYSTOLIC BLOOD PRESSURE: 106 MMHG | BODY MASS INDEX: 30.93 KG/M2 | HEART RATE: 59 BPM | RESPIRATION RATE: 18 BRPM | OXYGEN SATURATION: 97 % | DIASTOLIC BLOOD PRESSURE: 66 MMHG | WEIGHT: 204.1 LBS | HEIGHT: 68 IN

## 2022-07-25 DIAGNOSIS — E66.9 OBESITY (BMI 30-39.9): Primary | ICD-10-CM

## 2022-07-25 NOTE — PROGRESS NOTES
Weight Management. 1. Have you been to the ER, urgent care clinic since your last visit? Hospitalized since your last visit? No    2. Have you seen or consulted any other health care providers outside of the [address] [place] [address] since your last visit? Include any pap smears or colon screening.  No

## 2022-07-25 NOTE — PROGRESS NOTES
7/11/2022    Progress Note: Weekly Education Class in the [facility] Weight Loss Program         Patient is on Very Low Calorie Diet [] (4 meal replacements per day, 800 kcal/day)      Low Calorie Diet [x] (2-3 meal replacements per day, 3060-3217 kcal/day)    1) Did patient have any new symptoms or physical problems? Yes []    No [x]    If yes, check & comment: weakness [], fatigue [], lightheadedness [], headache [], cramps [], cold intolerance [], hair loss [], diarrhea [], constipation [],  NA [] other:                                 2) Has patient had any medical attention from other providers, urgent care or the emergency room this week? Yes []  No [x]       NA [], If yes, why:                                       3) Any other sugar sweetened beverages consumed this week? Yes []  No [x]    4) Did patient have any problems adhering to the diet? Yes []  No [x] NA []    If yes, Vacation [], Celebrations [], Conferences [], Family Reunions [] other:                                                5) How many hours of sleep this week? 5-8    (range)  NA []    Number of meal replacements consumed daily? 2  (range)  NA []    Average ounces of water patient consumed daily this week (not including shakes)? 80     (divide the weekly total by 7)    Did you eat any food outside of the program? Yes [x] No []    Physical Activity Over the Past Week:    Cardio exercise: 15 min  Strength exercise: 1 workouts / week  Number of steps walked per day: 0    How has patient mood overall been this week? Sad [], Happy [], Stressed [], Tired [], Content [], NA [], other NOT ANSWERED             Medications reconciled by nurse Yes [x]  No[]    Patient was given therapeutic recommendations for any noted side effects of their dietary approach based upon [facility] patient manual per providers recommendation.      7/18/2022    Progress Note: Weekly Education Class in the [facility] Weight Loss Program         Patient is on Very Low Calorie Diet [] (4 meal replacements per day, 800 kcal/day)      Low Calorie Diet [x] (2-3 meal replacements per day, 2246-7523 kcal/day)    1) Did patient have any new symptoms or physical problems? Yes []    No [x]    If yes, check & comment: weakness [], fatigue [], lightheadedness [], headache [], cramps [], cold intolerance [], hair loss [], diarrhea [], constipation [],  NA [] other:                                 2) Has patient had any medical attention from other providers, urgent care or the emergency room this week? Yes []  No [x]       NA [], If yes, why:                                       3) Any other sugar sweetened beverages consumed this week? Yes []  No X    4) Did patient have any problems adhering to the diet? Yes []  No [] NA []NOT ANSWERED      If yes, Vacation [], Celebrations [], Conferences [], Family Reunions [] other:                                                5) How many hours of sleep this week? 5-7    (range)  NA []    Number of meal replacements consumed daily? 2 (range)  NA []    Average ounces of water patient consumed daily this week (not including shakes)? 80     (divide the weekly total by 7)    Did you eat any food outside of the program? Yes [x] No []    Physical Activity Over the Past Week:    Cardio exercise: 30 min  Strength exercise: 2 workouts / week  Number of steps walked per day: 0    How has patient mood overall been this week? Sad [], Happy [], Stressed [], Tired [], Content [], NA [], other NOT ANSWERED             Medications reconciled by nurse Yes [x]  No[]    Patient was given therapeutic recommendations for any noted side effects of their dietary approach based upon [facility] patient manual per providers recommendation.

## 2022-08-01 NOTE — PROGRESS NOTES
Angela Leavitt presents for weekly or bi-monthly evaluation of Obesity Body mass index is 31.03 kg/m². Visit Vitals  /66 (BP 1 Location: Right arm, BP Patient Position: Sitting, BP Cuff Size: Adult)   Pulse (!) 59   Temp 97.8 °F (36.6 °C) (Oral)   Resp 18   Ht 5' 8\" (1.727 m)   Wt 204 lb 1.6 oz (92.6 kg)   SpO2 97%   BMI 31.03 kg/m²       Wt Readings from Last 3 Encounters:   07/25/22 204 lb 1.6 oz (92.6 kg)   07/12/22 204 lb 9.6 oz (92.8 kg)   06/20/22 208 lb 9.6 oz (94.6 kg)       1. Obesity (BMI 30-39. 9)         I have reviewed and agree with nurse documentation of Weekly Education Class nurse progress note for [facility] Weight [facility] [place]). Angela Leavitt is compliant with program requirements and may continue participation utilizing the [facility] meal replacements, as discussed. Patient has been advised to refer to the [facility] Patient Manual and notify us if any side effects to this meal plan are present and/or persist.  Angela Leavitt may continue the current dietary approach, care and follow up at the monthly office visit with the provider, as scheduled. Follow-up and Dispositions    Return in about 2 weeks (around 8/8/2022). Documentation true and accepted by Kiersten Tee.  Shashank Rubalcava., AOSUSANAP, Diplomate ENEDINA SANCHEZ

## 2022-08-04 ENCOUNTER — OFFICE VISIT (OUTPATIENT)
Dept: SURGERY | Age: 37
End: 2022-08-04

## 2022-08-04 DIAGNOSIS — E66.9 OBESITY (BMI 30-39.9): Primary | ICD-10-CM

## 2022-08-08 ENCOUNTER — CLINICAL SUPPORT (OUTPATIENT)
Dept: SURGERY | Age: 37
End: 2022-08-08

## 2022-08-08 VITALS
BODY MASS INDEX: 30.54 KG/M2 | DIASTOLIC BLOOD PRESSURE: 70 MMHG | TEMPERATURE: 98.3 F | WEIGHT: 201.5 LBS | SYSTOLIC BLOOD PRESSURE: 112 MMHG | RESPIRATION RATE: 18 BRPM | HEART RATE: 68 BPM | HEIGHT: 68 IN | OXYGEN SATURATION: 98 %

## 2022-08-08 DIAGNOSIS — E66.9 OBESITY (BMI 30-39.9): Primary | ICD-10-CM

## 2022-08-08 DIAGNOSIS — E78.00 HYPERCHOLESTEROLEMIA: ICD-10-CM

## 2022-08-08 NOTE — PROGRESS NOTES
Discussed results with patient. Patient states understanding. They had no futher questions at this time.   Nurse note from patient's weekly  / Isis Escobar / class was reviewed. Pertinent medical concerns were:   reviewed     BP Readings from Last 3 Encounters:   08/08/22 112/70   07/25/22 106/66   07/12/22 101/67       Weight Metrics 8/8/2022 7/25/2022 7/12/2022 7/12/2022 6/20/2022 6/7/2022 6/7/2022   Weight 201 lb 8 oz 204 lb 1.6 oz - 204 lb 9.6 oz 208 lb 9.6 oz - 211 lb 1.6 oz   Neck Circ (inches) - - 14.5 - - 14 -   Waist Measure Inches - - 38.5 - - 38 -   Body Fat % - - 27.8 - - 27.1 -   BMI 30.64 kg/m2 31.03 kg/m2 - 31.11 kg/m2 31.72 kg/m2 - 32.1 kg/m2       Current Outpatient Medications   Medication Sig Dispense Refill    fluocinoNIDE (LIDEX) 0.05 % topical cream Apply  to affected area two (2) times a day. 15 g 0    buPROPion SR (WELLBUTRIN SR) 100 mg SR tablet Take 1 Tablet by mouth two (2) times a day. 60 Tablet 2    multivitamins chew Take 2 Each by mouth daily.  Vitafsion Mens Daily

## 2022-08-08 NOTE — PROGRESS NOTES
1. Have you been to the ER, urgent care clinic since your last visit? Hospitalized since your last visit? No    2. Have you seen or consulted any other health care providers outside of the [address] [place] [address] since your last visit? Include any pap smears or colon screening. No        Progress Note: Weekly Education Class in the [facility] Weight Loss Program         Patient is on Very Low Calorie Diet [] (4 meal replacements per day, 800 kcal/day)      Low Calorie Diet [x] (2-3 meal replacements per day, 1506-7874 kcal/day)    1) Did patient have any new symptoms or physical problems? Yes []    No [x]    If yes, check & comment: weakness [], fatigue [], lightheadedness [], headache [], cramps [], cold intolerance [], hair loss [], diarrhea [], constipation [],  NA [] other:                                 2) Has patient had any medical attention from other providers, urgent care or the emergency room this week? Yes []  No [x]       NA [], If yes, why:                                       3) Any other sugar sweetened beverages consumed this week? Yes []  No [x]    4) Did patient have any problems adhering to the diet? Yes []  No [x] NA []    If yes, Vacation [], Celebrations [], Conferences [], Family Reunions [] other:                                                5) How many hours of sleep this week? 5-6   (range)  NA []    Number of meal replacements consumed daily? 2(range)  NA []    Average ounces of water patient consumed daily this week (not including shakes)? 80  (divide the weekly total by 7)    Did you eat any food outside of the program? Yes [] No [x]    Physical Activity Over the Past Week:    Cardio exercise: 15 min  Strength exercise: 1 workouts / week  Number of steps walked per day: n/a    How has patient mood overall been this week?  Sad [], Happy [], Stressed [], Tired [], Content [], NA [x], other            Medications reconciled by nurse Yes [x]  No[]    Patient was given therapeutic recommendations for any noted side effects of their dietary approach based upon [facility] patient manual per providers recommendation. Progress Note: Weekly Education Class in the [facility] Weight Loss Program         Patient is on Very Low Calorie Diet [] (4 meal replacements per day, 800 kcal/day)      Low Calorie Diet [x] (2-3 meal replacements per day, 2559-9561 kcal/day)    1) Did patient have any new symptoms or physical problems? Yes []    No [x]    If yes, check & comment: weakness [], fatigue [], lightheadedness [], headache [], cramps [], cold intolerance [], hair loss [], diarrhea [], constipation [],  NA [] other:                                 2) Has patient had any medical attention from other providers, urgent care or the emergency room this week? Yes []  No [x]       NA [], If yes, why:                                       3) Any other sugar sweetened beverages consumed this week? Yes []  No [x]    4) Did patient have any problems adhering to the diet? Yes []  No [x] NA []    If yes, Vacation [], Celebrations [], Conferences [], Family Reunions [] other:                                                5) How many hours of sleep this week? 5-8   (range)  NA []    Number of meal replacements consumed daily? 2 (range)  NA []    Average ounces of water patient consumed daily this week (not including shakes)? 80   (divide the weekly total by 7)    Did you eat any food outside of the program? Yes [] No [x]    Physical Activity Over the Past Week:    Cardio exercise:  60 min  Strength exercise: 1 workouts / week  Number of steps walked per day: n/a    How has patient mood overall been this week?  Sad [], Happy [], Stressed [], Tired [], Content [], NA [x], other            Medications reconciled by nurse Yes [x]  No[]    Patient was given therapeutic recommendations for any noted side effects of their dietary approach based upon [facility] patient manual per providers recommendation.

## 2022-08-09 NOTE — PROGRESS NOTES
[facility]  Metabolic Weight Loss Program        Patient's Name: Gisell Jon : 1985    This patient is enrolled in [address] [place] [address] Weight Loss Program and attended the required weekly virtual nutrition class hosted via [employer].       Lasha Hurtado MS, RD, LDN

## 2022-08-16 ENCOUNTER — OFFICE VISIT (OUTPATIENT)
Dept: SURGERY | Age: 37
End: 2022-08-16
Payer: COMMERCIAL

## 2022-08-16 VITALS
TEMPERATURE: 98.1 F | OXYGEN SATURATION: 98 % | DIASTOLIC BLOOD PRESSURE: 66 MMHG | RESPIRATION RATE: 18 BRPM | HEIGHT: 68 IN | HEART RATE: 64 BPM | WEIGHT: 200.2 LBS | BODY MASS INDEX: 30.34 KG/M2 | SYSTOLIC BLOOD PRESSURE: 100 MMHG

## 2022-08-16 DIAGNOSIS — E66.9 OBESITY (BMI 30-39.9): Primary | ICD-10-CM

## 2022-08-16 DIAGNOSIS — R21 RASH: ICD-10-CM

## 2022-08-16 PROCEDURE — 99214 OFFICE O/P EST MOD 30 MIN: CPT | Performed by: FAMILY MEDICINE

## 2022-08-16 NOTE — PROGRESS NOTES
Weight Management. 1. Have you been to the ER, urgent care clinic since your last visit? Hospitalized since your last visit? No    2. Have you seen or consulted any other health care providers outside of the [address] [place] [address] since your last visit? Include any pap smears or colon screening. No    BMI - 30.4      Progress Note: Weekly Education Class in the [facility] Weight Loss Program         Patient is on Very Low Calorie Diet [] (4 meal replacements per day, 800 kcal/day)      Low Calorie Diet [x] (2-3 meal replacements per day, 3894-4102 kcal/day)    1) Did patient have any new symptoms or physical problems? Yes []    No [x]    If yes, check & comment: weakness [], fatigue [], lightheadedness [], headache [], cramps [], cold intolerance [], hair loss [], diarrhea [], constipation [],  NA [] other:                                 2) Has patient had any medical attention from other providers, urgent care or the emergency room this week? Yes []  No [x]       NA [], If yes, why:                                       3) Any other sugar sweetened beverages consumed this week? Yes []  No [x]    4) Did patient have any problems adhering to the diet? Yes []  No [x] NA []    If yes, Vacation [], Celebrations [], Conferences [], Family Reunions [] other:                                                5) How many hours of sleep this week? 5-8    (range)  NA []    Number of meal replacements consumed daily? 2  (range)  NA []    Average ounces of water patient consumed daily this week (not including shakes)? 80     (divide the weekly total by 7)    Did you eat any food outside of the program? Yes [x] No []    Physical Activity Over the Past Week:    Cardio exercise: 180 min  Strength exercise: 2 workouts / week  Number of steps walked per day: 0    How has patient mood overall been this week?  Sad [], Happy [], Stressed [], Tired [], Content [], NA [], other NOT ANSWERED             Medications reconciled by nurse Yes [x]  No[]    Patient was given therapeutic recommendations for any noted side effects of their dietary approach based upon [facility] patient manual per providers recommendation.

## 2022-08-16 NOTE — PROGRESS NOTES
[facility] Weight Loss Program Progress Note:   F/up Physician Visit    CC: Weight Management      Liam Kirby is a 44 y.o. male who is here for his  f/up physician visit for the LCD Program.  His goal is 190 lbs  Now at 200  Highest is 321 lbs in late 2020 when we started  Weight Metrics 8/16/2022 8/16/2022 8/8/2022 7/25/2022 7/12/2022 7/12/2022 6/20/2022   Weight - 200 lb 3.2 oz 201 lb 8 oz 204 lb 1.6 oz - 204 lb 9.6 oz 208 lb 9.6 oz   Neck Circ (inches) 14.75 - - - 14.5 - -   Waist Measure Inches 38 - - - 38.5 - -   Body Fat % 26.9 - - - 27.8 - -   BMI - 30.44 kg/m2 30.64 kg/m2 31.03 kg/m2 - 31.11 kg/m2 31.72 kg/m2         Outpatient Medications Marked as Taking for the 8/16/22 encounter (Office Visit) with Bella Li MD   Medication Sig Dispense Refill    fluocinoNIDE (LIDEX) 0.05 % topical cream Apply  to affected area two (2) times a day. 15 g 0    multivitamins chew Take 2 Each by mouth daily. Vitafsion Mens Daily            Participation   Did you attend clinic and class last week? no    Review of Systems  Since your last visit, have you experienced any complications? no  If yes, please list:       Are you taking an appetite suppressant? No, he stopped the wellbutrin  If so, is there any Chest Pain, Palpitations or Dizziness? HUNGER CONTROL: good    BP Readings from Last 3 Encounters:   08/16/22 100/66   08/08/22 112/70   07/25/22 106/66       SLEEP: 6-7    Have you received any other medical care this week? no  If yes, where and for what? Have you discontinued or changed any medicine or dose of your medicine since your last visit with Dr Tsering Sheikh? no  If yes, where and for what?          Diet  How many ounces of calorie-free liquids did you consume each day?  80 oz    How many meal replacements did you take each day? 2 and a meal    Did you have any problems adhering to the program?  no If yes, please explain:      Exercise  Aerobic exercise: [employer] m,w, f and now has a treadmill 60  min all other days  Resistance exercise: yes in the [employer] workouts / week  Any discomfort?  no     If yes, where? Objective  Visit Vitals  /66 (BP 1 Location: Right arm, BP Patient Position: Sitting, BP Cuff Size: Large adult)   Pulse 64   Temp 98.1 °F (36.7 °C) (Oral)   Resp 18   Ht 5' 8\" (1.727 m)   Wt 200 lb 3.2 oz (90.8 kg)   SpO2 98%   BMI 30.44 kg/m²     No LMP for male patient. Physical Exam  Appearance: well,   Mental:A&O x 3, NAD  H:NC/AT,  EENT:   EOMI, PERRL, No scleral icterus  Neck: no bruit or JVD  Lung: clear, No W/R  ABD: soft, active, nontender  Ext:  no Edema  Neuro: nonfocal  Assessment / Plan    Encounter Diagnoses   Name Primary? Obesity (BMI 30-39. 9) Yes    Rash      Diagnoses and all orders for this visit:    1. Obesity (BMI 13-88.2)  -     METABOLIC PANEL, COMPREHENSIVE; Future  Started at over 300 lbs  Doing well  10 lbs from goal now  Will need to start transitioning soon  He has stopped the wellbutrin , doing well without it now  No need to restart  2. Rash  -     REFERRAL TO DERMATOLOGY  -     METABOLIC PANEL, COMPREHENSIVE; Future  The rash is a little better but still very large on the right leg   Have derm eval for psoriasis  Continue the lidex until seen by derm  Other orders  -     METABOLIC PANEL, COMPREHENSIVE    1. Weight management well controlled   Progress was reviewed with patient    2. Labs    Latest results reviewed with patient       face to face time with Simona West consisted of counseling & coordinating and/or discussing treatment plans in reference to his obesity The primary encounter diagnosis was Obesity (BMI 30-39.9). A diagnosis of Rash was also pertinent to this visit.

## 2022-08-17 LAB
ALBUMIN SERPL-MCNC: 4.7 G/DL (ref 4–5)
ALBUMIN/GLOB SERPL: 1.5 {RATIO} (ref 1.2–2.2)
ALP SERPL-CCNC: 75 IU/L (ref 44–121)
ALT SERPL-CCNC: 17 IU/L (ref 0–44)
AST SERPL-CCNC: 24 IU/L (ref 0–40)
BILIRUB SERPL-MCNC: 0.6 MG/DL (ref 0–1.2)
BUN SERPL-MCNC: 13 MG/DL (ref 6–20)
BUN/CREAT SERPL: 16 (ref 9–20)
CALCIUM SERPL-MCNC: 9.6 MG/DL (ref 8.7–10.2)
CHLORIDE SERPL-SCNC: 106 MMOL/L (ref 96–106)
CO2 SERPL-SCNC: 24 MMOL/L (ref 20–29)
CREAT SERPL-MCNC: 0.79 MG/DL (ref 0.76–1.27)
EGFR: 117 ML/MIN/1.73
GLOBULIN SER CALC-MCNC: 3.2 G/DL (ref 1.5–4.5)
GLUCOSE SERPL-MCNC: 90 MG/DL (ref 65–99)
POTASSIUM SERPL-SCNC: 4.2 MMOL/L (ref 3.5–5.2)
PROT SERPL-MCNC: 7.9 G/DL (ref 6–8.5)
SODIUM SERPL-SCNC: 143 MMOL/L (ref 134–144)

## 2022-08-18 ENCOUNTER — OFFICE VISIT (OUTPATIENT)
Dept: SURGERY | Age: 37
End: 2022-08-18

## 2022-08-18 DIAGNOSIS — E66.9 OBESITY (BMI 30-39.9): Primary | ICD-10-CM

## 2022-08-22 NOTE — PROGRESS NOTES
[facility]  Metabolic Weight Loss Program        Patient's Name: Precious Fleming. : 1985    This patient is enrolled in [address] [place] [address] Weight Loss Program and attended the required weekly virtual nutrition class hosted via [address] [place] [address] today.       Michele Dave, MS, RD, LDN

## 2022-08-25 ENCOUNTER — OFFICE VISIT (OUTPATIENT)
Dept: SURGERY | Age: 37
End: 2022-08-25

## 2022-08-25 DIAGNOSIS — E66.9 OBESITY (BMI 30-39.9): Primary | ICD-10-CM

## 2022-08-29 ENCOUNTER — CLINICAL SUPPORT (OUTPATIENT)
Dept: SURGERY | Age: 37
End: 2022-08-29

## 2022-08-29 VITALS
SYSTOLIC BLOOD PRESSURE: 108 MMHG | HEART RATE: 72 BPM | DIASTOLIC BLOOD PRESSURE: 71 MMHG | WEIGHT: 198.8 LBS | HEIGHT: 68 IN | OXYGEN SATURATION: 98 % | BODY MASS INDEX: 30.13 KG/M2 | TEMPERATURE: 98.8 F | RESPIRATION RATE: 18 BRPM

## 2022-08-29 DIAGNOSIS — E66.9 OBESITY (BMI 30-39.9): Primary | ICD-10-CM

## 2022-08-29 DIAGNOSIS — E78.00 HYPERCHOLESTEROLEMIA: ICD-10-CM

## 2022-08-29 NOTE — PROGRESS NOTES
Progress Note: Weekly Education Class in the [facility] Weight Loss Program         Patient is on Very Low Calorie Diet [] (4 meal replacements per day, 800 kcal/day)      Low Calorie Diet [x] (2-3 meal replacements per day, 2886-4579 kcal/day)    1) Did patient have any new symptoms or physical problems? Yes []    No [x]    If yes, check & comment: weakness [], fatigue [], lightheadedness [], headache [], cramps [], cold intolerance [], hair loss [], diarrhea [], constipation [],  NA [] other:                                 2) Has patient had any medical attention from other providers, urgent care or the emergency room this week? Yes []  No [x]       NA [], If yes, why:                                       3) Any other sugar sweetened beverages consumed this week? Yes []  No [x]    4) Did patient have any problems adhering to the diet? Yes []  No [x] NA []    If yes, Vacation [], Celebrations [], Conferences [], Family Reunions [] other:                                                5) How many hours of sleep this week? 44    (range)  NA []    Number of meal replacements consumed daily? 14 (range)  NA []    Average ounces of water patient consumed daily this week (not including shakes) 80oz     (divide the weekly total by 7)    Did you eat any food outside of the program? Yes ~ No []    Physical Activity Over the Past Week:    Cardio exercise: 120 min  Strength exercise: 2 workouts / week  Number of steps walked per day: How has patient mood overall been this week? Sad [], Happy [x], Stressed [], Tired [], Content [], NA [], other            Medications reconciled by nurse Yes [x]  No[]    Patient was given therapeutic recommendations for any noted side effects of their dietary approach based upon [facility] patient manual per providers recommendation.

## 2022-08-29 NOTE — PROGRESS NOTES
[facility]  Metabolic Weight Loss Program        Patient's Name: Ambreen Johnson. : 1985    This patient is enrolled in [address] [place] [address] Weight Loss Program and attended the required weekly virtual nutrition class hosted via [employer] today.       Lamonte Adrian, MS, RD, LDN

## 2022-09-01 ENCOUNTER — OFFICE VISIT (OUTPATIENT)
Dept: SURGERY | Age: 37
End: 2022-09-01

## 2022-09-01 DIAGNOSIS — E66.9 OBESITY (BMI 30-39.9): Primary | ICD-10-CM

## 2022-09-02 NOTE — PROGRESS NOTES
[facility]  Metabolic Weight Loss Program        Patient's Name: Becki Fontana. : 1985    This patient is enrolled in [address] [place] [address] Weight Loss Program and attended the required weekly virtual nutrition class hosted via [employer].       Santos Mckeon, MS, RD, LDN

## 2022-09-02 NOTE — PROGRESS NOTES
Nurse note from patient's weekly / LCD / Maintenance class was reviewed. Pertinent medical concerns were:   reviewed     BP Readings from Last 3 Encounters:   08/29/22 108/71   08/16/22 100/66   08/08/22 112/70       Weight Metrics 8/29/2022 8/16/2022 8/16/2022 8/8/2022 7/25/2022 7/12/2022 7/12/2022   Weight 198 lb 12.8 oz - 200 lb 3.2 oz 201 lb 8 oz 204 lb 1.6 oz - 204 lb 9.6 oz   Neck Circ (inches) - 14.75 - - - 14.5 -   Waist Measure Inches - 38 - - - 38.5 -   Body Fat % - 26.9 - - - 27.8 -   BMI 30.23 kg/m2 - 30.44 kg/m2 30.64 kg/m2 31.03 kg/m2 - 31.11 kg/m2       Current Outpatient Medications   Medication Sig Dispense Refill    fluocinoNIDE (LIDEX) 0.05 % topical cream Apply  to affected area two (2) times a day. 15 g 0    multivitamins chew Take 2 Each by mouth daily.  Vitafsion Mens Daily

## 2022-09-08 ENCOUNTER — OFFICE VISIT (OUTPATIENT)
Dept: SURGERY | Age: 37
End: 2022-09-08

## 2022-09-08 DIAGNOSIS — E66.9 OBESITY (BMI 30-39.9): Primary | ICD-10-CM

## 2022-09-09 NOTE — PROGRESS NOTES
[facility]  Metabolic Weight Loss Program        Patient's Name: Vic Vargas. : 1985    This patient is enrolled in [address] [place] [address] Weight Loss Program and attended the required weekly virtual nutrition class hosted via [employer] today.       Alex Grey, MS, RD, LDN

## 2022-09-12 ENCOUNTER — CLINICAL SUPPORT (OUTPATIENT)
Dept: SURGERY | Age: 37
End: 2022-09-12

## 2022-09-12 VITALS
HEART RATE: 65 BPM | RESPIRATION RATE: 18 BRPM | SYSTOLIC BLOOD PRESSURE: 106 MMHG | WEIGHT: 201.2 LBS | OXYGEN SATURATION: 97 % | BODY MASS INDEX: 30.49 KG/M2 | HEIGHT: 68 IN | TEMPERATURE: 98.3 F | DIASTOLIC BLOOD PRESSURE: 69 MMHG

## 2022-09-12 DIAGNOSIS — E66.9 OBESITY (BMI 30-39.9): Primary | ICD-10-CM

## 2022-09-12 DIAGNOSIS — G47.33 OSA (OBSTRUCTIVE SLEEP APNEA): ICD-10-CM

## 2022-09-12 DIAGNOSIS — E78.00 HYPERCHOLESTEROLEMIA: ICD-10-CM

## 2022-09-12 NOTE — PROGRESS NOTES
Weight Management. 1. Have you been to the ER, urgent care clinic since your last visit? Hospitalized since your last visit? No    2. Have you seen or consulted any other health care providers outside of the [address] [place] [address] since your last visit? Include any pap smears or colon screening.  No

## 2022-09-12 NOTE — PROGRESS NOTES
8/29/2022    Progress Note: Weekly Education Class in the [facility] Weight Loss Program         Patient is on Very Low Calorie Diet [] (4 meal replacements per day, 800 kcal/day)      Low Calorie Diet [x] (2-3 meal replacements per day, 4973-4112 kcal/day)    1) Did patient have any new symptoms or physical problems? Yes []    No [x]    If yes, check & comment: weakness [], fatigue [], lightheadedness [], headache [], cramps [], cold intolerance [], hair loss [], diarrhea [], constipation [],  NA [] other:                                 2) Has patient had any medical attention from other providers, urgent care or the emergency room this week? Yes []  No [x]       NA [], If yes, why:                                       3) Any other sugar sweetened beverages consumed this week? Yes []  No [x]    4) Did patient have any problems adhering to the diet? Yes []  No [x] NA []    If yes, Vacation [], Celebrations [], Conferences [], Family Reunions [] other:                                                5) How many hours of sleep this week? 5-7    (range)  NA []    Number of meal replacements consumed daily? 2  (range)  NA []    Average ounces of water patient consumed daily this week (not including shakes)? 80     (divide the weekly total by 7)    Did you eat any food outside of the program? Yes [x] No []    Physical Activity Over the Past Week:    Cardio exercise: 120 min  Strength exercise: 2 workouts / week  Number of steps walked per day: 0    How has patient mood overall been this week? Sad [], Happy [], Stressed [], Tired [], Content [], NA [], other NOT ANSWERED             Medications reconciled by nurse Yes [x]  No[]    Patient was given therapeutic recommendations for any noted side effects of their dietary approach based upon [facility] patient manual per providers recommendation.      9/5/2022    Progress Note: Weekly Education Class in the [facility] Weight Loss Program         Patient is on Very Low Calorie Diet [] (4 meal replacements per day, 800 kcal/day)      Low Calorie Diet [x] (2-3 meal replacements per day, 6465-7103 kcal/day)    1) Did patient have any new symptoms or physical problems? Yes []    No [x]    If yes, check & comment: weakness [], fatigue [], lightheadedness [], headache [], cramps [], cold intolerance [], hair loss [], diarrhea [], constipation [],  NA [] other:                                 2) Has patient had any medical attention from other providers, urgent care or the emergency room this week? Yes []  No [x]       NA [], If yes, why:                                       3) Any other sugar sweetened beverages consumed this week? Yes []  No [x]    4) Did patient have any problems adhering to the diet? Yes []  No [x] NA []    If yes, Vacation [], Celebrations [], Conferences [], Family Reunions [] other:                                               5) How many hours of sleep this week? 5-7    (range)  NA []    Number of meal replacements consumed daily? 2  (range)  NA []    Average ounces of water patient consumed daily this week (not including shakes)? 80     (divide the weekly total by 7)    Did you eat any food outside of the program? Yes [x] No []    Physical Activity Over the Past Week:    Cardio exercise: 120 min  Strength exercise: 1 workouts / week  Number of steps walked per day: 0    How has patient mood overall been this week? Sad [], Happy [], Stressed [], Tired [], Content [], NA [], other NOT ANSWERED             Medications reconciled by nurse Yes [x]  No[]    Patient was given therapeutic recommendations for any noted side effects of their dietary approach based upon [facility] patient manual per providers recommendation.

## 2022-09-20 ENCOUNTER — OFFICE VISIT (OUTPATIENT)
Dept: SURGERY | Age: 37
End: 2022-09-20
Payer: COMMERCIAL

## 2022-09-20 VITALS
DIASTOLIC BLOOD PRESSURE: 69 MMHG | RESPIRATION RATE: 18 BRPM | WEIGHT: 199.4 LBS | OXYGEN SATURATION: 98 % | HEIGHT: 68 IN | BODY MASS INDEX: 30.22 KG/M2 | SYSTOLIC BLOOD PRESSURE: 105 MMHG | HEART RATE: 68 BPM | TEMPERATURE: 98.2 F

## 2022-09-20 DIAGNOSIS — E66.9 OBESITY (BMI 30-39.9): Primary | ICD-10-CM

## 2022-09-20 DIAGNOSIS — E78.00 HYPERCHOLESTEROLEMIA: ICD-10-CM

## 2022-09-20 DIAGNOSIS — G47.33 OSA (OBSTRUCTIVE SLEEP APNEA): ICD-10-CM

## 2022-09-20 PROCEDURE — 99213 OFFICE O/P EST LOW 20 MIN: CPT | Performed by: FAMILY MEDICINE

## 2022-09-20 NOTE — PROGRESS NOTES
[facility] Progress Note:   F/up Physician Visit    CC: Weight Management      Luis Alberto Rizvi is a 44 y.o. male who is here for his  f/up physician visit for the  LCD Program.  Next month he wants to transition to maintenance      Weight Metrics 9/20/2022 9/20/2022 9/12/2022 8/29/2022 8/16/2022 8/16/2022 8/8/2022   Weight - 199 lb 6.4 oz 201 lb 3.2 oz 198 lb 12.8 oz - 200 lb 3.2 oz 201 lb 8 oz   Neck Circ (inches) 14.75 - - - 14.75 - -   Waist Measure Inches 38 - - - 38 - -   Body Fat % 26.8 - - - 26.9 - -   BMI - 30.32 kg/m2 30.59 kg/m2 30.23 kg/m2 - 30.44 kg/m2 30.64 kg/m2         Outpatient Medications Marked as Taking for the 9/20/22 encounter (Office Visit) with Apollo Oglesby MD   Medication Sig Dispense Refill    fluocinoNIDE (LIDEX) 0.05 % topical cream Apply  to affected area two (2) times a day. 15 g 0    multivitamins chew Take 2 Each by mouth daily. Vitafsion Mens Daily            Participation   Did you attend clinic and class last week? yes    Review of Systems  Since your last visit, have you experienced any complications? no  If yes, please list:       Are you taking an appetite suppressant? no  If so, is there any Chest Pain, Palpitations or Dizziness? HUNGER CONTROL: good    BP Readings from Last 3 Encounters:   09/20/22 105/69   09/12/22 106/69   08/29/22 108/71       SLEEP:5-7    Have you received any other medical care this week? no  If yes, where and for what? Have you discontinued or changed any medicine or dose of your medicine since your last visit with Dr Go Machuca? no  If yes, where and for what? Diet  How many ounces of calorie-free liquids did you consume each day?     oz    How many meal replacements did you take each day?  2    Did you have any problems adhering to the program?  no If yes, please explain:      Exercise  Aerobic exercise: 60 min 3 days a week  Resistance exercise: 2 days strength workouts / week  Any discomfort?  no     If yes, where?      Objective  Visit Vitals  /69 (BP 1 Location: Right arm, BP Patient Position: Sitting, BP Cuff Size: Large adult)   Pulse 68   Temp 98.2 °F (36.8 °C) (Oral)   Resp 18   Ht 5' 8\" (1.727 m)   Wt 199 lb 6.4 oz (90.4 kg)   SpO2 98%   BMI 30.32 kg/m²     No LMP for male patient. Physical Exam  Appearance: well,   Mental:A&O x 3, NAD  H:NC/AT,  EENT:   EOMI, PERRL, No scleral icterus  Neck: no bruit or JVD  Lung: clear, No W/R  ABD: soft, active, nontender  Ext:  no Edema  Neuro: nonfocal  Assessment / Plan    Encounter Diagnoses   Name Primary? Obesity (BMI 30-39. 9) Yes    Hypercholesterolemia     GUDELIA (obstructive sleep apnea)      Diagnoses and all orders for this visit:    1. Obesity (BMI 30-39. 9)  He has done very well   BMI 30 and weighs 199, Xuan started April 2021 at 310 lbs and and was 48.55  He wants to transition to maintenance next month  Speak to the dietitian now about starting the transition  2. Hypercholesterolemia  Levels now normal, LDL was abn in 2020  3. GUDELIA (obstructive sleep apnea)  He feels better when he uses the cpap  He sees a big difference when he does not use it  He wants to continue although he has lost a lot of weight    1. Weight management improved   Progress was reviewed with patient    2. Labs    Latest results reviewed with patient       face to face time with Simona West consisted of counseling & coordinating and/or discussing treatment plans in reference to his obesity The primary encounter diagnosis was Obesity (BMI 30-39.9). Diagnoses of Hypercholesterolemia and GUDELIA (obstructive sleep apnea) were also pertinent to this visit.

## 2022-09-20 NOTE — PROGRESS NOTES
Progress Note: Weekly Education Class in the [facility] Weight Loss Program         Patient is on Very Low Calorie Diet [] (4 meal replacements per day, 800 kcal/day)      Low Calorie Diet [x] (2-3 meal replacements per day, 4642-0317 kcal/day)    1) Did patient have any new symptoms or physical problems? Yes []    No [x]    If yes, check & comment: weakness [], fatigue [], lightheadedness [], headache [], cramps [], cold intolerance [], hair loss [], diarrhea [], constipation [],  NA [] other:                                 2) Has patient had any medical attention from other providers, urgent care or the emergency room this week? Yes []  No [x]       NA [], If yes, why:                                       3) Any other sugar sweetened beverages consumed this week? Yes []  No [x]    4) Did patient have any problems adhering to the diet? Yes []  No [x] NA []    If yes, Vacation [], Celebrations [], Conferences [], Family Reunions [] other:                                                5) How many hours of sleep this week? 5-8    (range)  NA []    Number of meal replacements consumed daily? 2  (range)  NA []    Average ounces of water patient consumed daily this week (not including shakes)? 80     (divide the weekly total by 7)    Did you eat any food outside of the program? Yes [x] No []    Physical Activity Over the Past Week:    Cardio exercise: 180 min  Strength exercise: 2 workouts / week  Number of steps walked per day: 0    How has patient mood overall been this week? Sad [], Happy [], Stressed [], Tired [], Content [], NA [], other NOT ANSWERED             Medications reconciled by nurse Yes [x]  No[]    Patient was given therapeutic recommendations for any noted side effects of their dietary approach based upon [facility] patient manual per providers recommendation.

## 2022-09-20 NOTE — PROGRESS NOTES
Weight Management. 1 month follow up. 1. Have you been to the ER, urgent care clinic since your last visit? Hospitalized since your last visit? No    2. Have you seen or consulted any other health care providers outside of the [address] [place] [address] since your last visit? Include any pap smears or colon screening.  No    BMI - 30.2

## 2022-09-22 ENCOUNTER — OFFICE VISIT (OUTPATIENT)
Dept: SURGERY | Age: 37
End: 2022-09-22

## 2022-09-22 DIAGNOSIS — E66.9 OBESITY (BMI 30-39.9): Primary | ICD-10-CM

## 2022-09-22 NOTE — PROGRESS NOTES
[facility]  Metabolic Weight Loss Program        Patient's Name: Harini Montgomery. : 1985    This patient is enrolled in [address] [place] [address] Weight Loss Program and attended the required weekly virtual nutrition class hosted via [employer].       Gordo Cuellar, MS, RD, LDN

## 2022-09-29 ENCOUNTER — OFFICE VISIT (OUTPATIENT)
Dept: SURGERY | Age: 37
End: 2022-09-29

## 2022-09-29 DIAGNOSIS — E66.9 OBESITY (BMI 30-39.9): Primary | ICD-10-CM

## 2022-09-30 NOTE — PROGRESS NOTES
[facility]  Metabolic Weight Loss Program        Patient's Name: Sachi Villa. : 1985    This patient is enrolled in [address] [place] [address] Weight Loss Program and attended the required weekly virtual nutrition class hosted via [employer].       Roro Ahmadi, MS, RD, LDN

## 2022-10-03 ENCOUNTER — CLINICAL SUPPORT (OUTPATIENT)
Dept: SURGERY | Age: 37
End: 2022-10-03

## 2022-10-03 VITALS
BODY MASS INDEX: 30.11 KG/M2 | WEIGHT: 198.7 LBS | RESPIRATION RATE: 18 BRPM | TEMPERATURE: 98.1 F | SYSTOLIC BLOOD PRESSURE: 103 MMHG | HEIGHT: 68 IN | HEART RATE: 68 BPM | DIASTOLIC BLOOD PRESSURE: 69 MMHG | OXYGEN SATURATION: 97 %

## 2022-10-03 DIAGNOSIS — E66.9 OBESITY (BMI 30-39.9): Primary | ICD-10-CM

## 2022-10-03 DIAGNOSIS — G47.33 OSA (OBSTRUCTIVE SLEEP APNEA): ICD-10-CM

## 2022-10-03 DIAGNOSIS — E78.00 HYPERCHOLESTEROLEMIA: ICD-10-CM

## 2022-10-03 RX ORDER — AMMONIUM LACTATE 12 G/100G
LOTION TOPICAL
COMMUNITY
Start: 2022-08-30

## 2022-10-03 NOTE — PROGRESS NOTES
Nurse note from patient's weekly / LCD / Maintenance class was reviewed. Pertinent medical concerns were:   reviewed     BP Readings from Last 3 Encounters:   09/20/22 105/69   09/12/22 106/69   08/29/22 108/71       Weight Metrics 9/20/2022 9/20/2022 9/12/2022 8/29/2022 8/16/2022 8/16/2022 8/8/2022   Weight - 199 lb 6.4 oz 201 lb 3.2 oz 198 lb 12.8 oz - 200 lb 3.2 oz 201 lb 8 oz   Neck Circ (inches) 14.75 - - - 14.75 - -   Waist Measure Inches 38 - - - 38 - -   Body Fat % 26.8 - - - 26.9 - -   BMI - 30.32 kg/m2 30.59 kg/m2 30.23 kg/m2 - 30.44 kg/m2 30.64 kg/m2       Current Outpatient Medications   Medication Sig Dispense Refill    fluocinoNIDE (LIDEX) 0.05 % topical cream Apply  to affected area two (2) times a day. 15 g 0    multivitamins chew Take 2 Each by mouth daily.  Vitafsion Mens Daily

## 2022-10-03 NOTE — PROGRESS NOTES
Weight Management. 1. Have you been to the ER, urgent care clinic since your last visit? Hospitalized since your last visit? No    2. Have you seen or consulted any other health care providers outside of the [address] [place] [address] since your last visit? Include any pap smears or colon screening.  No

## 2022-10-06 ENCOUNTER — OFFICE VISIT (OUTPATIENT)
Dept: SURGERY | Age: 37
End: 2022-10-06

## 2022-10-06 DIAGNOSIS — E66.9 OBESITY (BMI 30-39.9): Primary | ICD-10-CM

## 2022-10-12 NOTE — PROGRESS NOTES
[facility]  Metabolic Weight Loss Program        Patient's Name: Bull Medrano. : 1985    This patient is enrolled in [address] [place] [address] Weight Loss Program and attended the required weekly virtual nutrition class hosted via [employer].       Tino Kc MS, RD, LDN

## 2022-10-13 ENCOUNTER — OFFICE VISIT (OUTPATIENT)
Dept: SURGERY | Age: 37
End: 2022-10-13

## 2022-10-13 DIAGNOSIS — E66.9 OBESITY (BMI 30-39.9): Primary | ICD-10-CM

## 2022-10-13 NOTE — PROGRESS NOTES
[facility]  Metabolic Weight Loss Program        Patient's Name: Hernandez Daily. : 1985    This patient is enrolled in [address] [place] [address] Weight Loss Program and attended the required weekly virtual nutrition class hosted via [employer].       Lamar Ruiz, MS, RD, LDN

## 2022-10-20 ENCOUNTER — CLINICAL SUPPORT (OUTPATIENT)
Dept: SURGERY | Age: 37
End: 2022-10-20

## 2022-10-20 DIAGNOSIS — E66.9 OBESITY (BMI 30-39.9): Primary | ICD-10-CM

## 2022-10-20 NOTE — PROGRESS NOTES
[facility]  Metabolic Program Follow-up Nutrition Consult    Date: 10/20/2022   Physician: Comfort Cardenas MD  Name: Lindsay Valencia. :  1985    Type of Plan: LCD to maintenance? Weeks on Plan: > 1 year  Virtual visit was completed through Zoom. ASSESSMENT:    Medications/Supplements:   Prior to Admission medications    Medication Sig Start Date End Date Taking? Authorizing Provider   ammonium lactate (LAC-HYDRIN) 12 % lotion APPLY TO LEGS TWICE DAILY. 22   Provider, Historical   fluocinoNIDE (LIDEX) 0.05 % topical cream Apply  to affected area two (2) times a day. 22   Radha Garcia MD   multivitamins chew Take 2 Each by mouth daily. Vitafsion Mens Daily     Provider, Historical              Starting Weight: 310# (2020)   Current Weight: 198#  Overall Pounds Lost: 112#   Overall Pounds Gained: 0    Exercise/Physical Activity:MWF [employer],  days ( 4d/w) treadmill     Is patient using [facility] products:yes  If yes, how many per day: 3    Aversions/side effects of product/program:none    Fluids used to mix with products:water    Reported Diet History:   Still doing 2 ND products per day, plus one meal at night. Then a snack in between: carrots, keto chips, leftovers. May have one cookie PRN    Meals: cauliflower pizza, rice bowls with cauliflower, zucchini noodles, salad and chicken. Has mostly refrained from pasta, rice, potatoes, and other complex carbs    Barriers/concerns preventing patient from achieving goal(s) since last visit: none at this time. NUTRITION INTERVENTION:  Pt educated on nutrition recommendations for the [facility] Low Calorie Plan (LCD), with the specific meal pattern of reducing to 1 meal replacements every day plus 2 grocery meals and one grocery or ND product snack. Daily recommended totals: 1200 calories, 60 grams carbs, 80+ grams protein, and remaining calories as healthy fats.   Use LCD handout for meal and snack suggestions and preparation. Grocery meal:  Use the balanced plate method to plan meals, include 3-6 oz of lean source of protein, unlimited non-starchy vegetables, 1/2 cup whole grains/beans OR 1/2 cup fruit OR 1 serving of low fat dairy. Utilize handouts listing healthy snack and meal ideas. Read all nutrition labels. Demonstrated and emphasized identifying serving size, total fat, sugar and protein content. Defined low fat as </= 3 g per serving. Discussed lean and extra lean sources of protein. Avoid foods with sugar listed in the first 3 ingredients and >/10 g sugar per serving. Consume meal replacements every three to four hours or pattern as discussed with provider. Mix with water. May add herbs/spices for taste. Practice mindful eating habits; take small bites, chew thoroughly, avoid distractions, utilize hunger/fullness scale. Reviewed attendance policy of attending weekly nutrition classes. Metabolic support group recommended, and increase physical activity (approved per MD) for long term weight maintenance. NUTRITION MONITORING AND EVALUATION:  As patient nears final goal weight, he wanted to meet today to discuss reducing by one meal replacement and adding a second grocery meal.  Discussed increasing variety of foods and adding a complex carb at least 2 days per week to at least one meal, especially as he continues to increase exercise. Will continue to increase complex carbs at next visit. His REE: 1797, therefore even if he increases second meal to 500 calories from his 200 calorie shake, he will remain at a calorie deficit when averaging 1500 calories per day. He plans to add lunch foods back into rotation. We will followup in two weeks to discuss how this will impact weight patterns, GI, and overall feelings of wellness. Adherence very likely. Specific tips and techniques to facilitate compliance with above recommendations were provided and discussed. If further details are desired please contact me at [phone]. This phone number was also provided to the patient for any further questions or concerns.           Lamar Ruiz MS, RD, LDN

## 2022-10-26 ENCOUNTER — VIRTUAL VISIT (OUTPATIENT)
Dept: SURGERY | Age: 37
End: 2022-10-26
Payer: COMMERCIAL

## 2022-10-26 DIAGNOSIS — E66.9 OBESITY (BMI 30-39.9): Primary | ICD-10-CM

## 2022-10-26 DIAGNOSIS — G47.33 OSA (OBSTRUCTIVE SLEEP APNEA): ICD-10-CM

## 2022-10-26 DIAGNOSIS — E78.00 HYPERCHOLESTEROLEMIA: ICD-10-CM

## 2022-10-26 PROCEDURE — 99214 OFFICE O/P EST MOD 30 MIN: CPT | Performed by: FAMILY MEDICINE

## 2022-10-26 NOTE — PROGRESS NOTES
10/3/2022    Progress Note: Weekly Education Class in the [facility] Weight Loss Program         Patient is on Very Low Calorie Diet [] (4 meal replacements per day, 800 kcal/day)      Low Calorie Diet [x] (2-3 meal replacements per day, 6230-4605 kcal/day)    1) Did patient have any new symptoms or physical problems? Yes []    No [x]    If yes, check & comment: weakness [], fatigue [], lightheadedness [], headache [], cramps [], cold intolerance [], hair loss [], diarrhea [], constipation [],  NA [] other:                                 2) Has patient had any medical attention from other providers, urgent care or the emergency room this week? Yes []  No [x]       NA [], If yes, why:                                       3) Any other sugar sweetened beverages consumed this week? Yes []  No [x]    4) Did patient have any problems adhering to the diet? Yes []  No [x] NA []    If yes, Vacation [], Celebrations [], Conferences [], Family Reunions [] other:                                                5) How many hours of sleep this week? 5-8    (range)  NA []    Number of meal replacements consumed daily? 2  (range)  NA []    Average ounces of water patient consumed daily this week (not including shakes)? 80     (divide the weekly total by 7)    Did you eat any food outside of the program? Yes [x] No []    Physical Activity Over the Past Week:    Cardio exercise: 240 min  Strength exercise: 2 workouts / week  Number of steps walked per day: 0    How has patient mood overall been this week? Sad [], Happy [], Stressed [], Tired [], Content [], NA [], other NOT ANSWERED             Medications reconciled by nurse Yes [x]  No[]    Patient was given therapeutic recommendations for any noted side effects of their dietary approach based upon [facility] patient manual per providers recommendation.      10/10/2022    Progress Note: Weekly Education Class in the [facility] Weight Loss Program         Patient is on Very Low Calorie Diet [] (4 meal replacements per day, 800 kcal/day)      Low Calorie Diet [x] (2-3 meal replacements per day, 4042-3613 kcal/day)    1) Did patient have any new symptoms or physical problems? Yes []    No [x]    If yes, check & comment: weakness [], fatigue [], lightheadedness [], headache [], cramps [], cold intolerance [], hair loss [], diarrhea [], constipation [],  NA [] other:                                 2) Has patient had any medical attention from other providers, urgent care or the emergency room this week? Yes []  No [x]       NA [], If yes, why:                                       3) Any other sugar sweetened beverages consumed this week? Yes []  No [x]    4) Did patient have any problems adhering to the diet? Yes []  No [x] NA []    If yes, Vacation [], Celebrations [], Conferences [], Family Reunions [] other:                                                5) How many hours of sleep this week? 5-8    (range)  NA []    Number of meal replacements consumed daily? 2  (range)  NA []    Average ounces of water patient consumed daily this week (not including shakes)? 80     (divide the weekly total by 7)    Did you eat any food outside of the program? Yes [x] No []    Physical Activity Over the Past Week:    Cardio exercise: 180 min  Strength exercise: 3 workouts / week  Number of steps walked per day: 0    How has patient mood overall been this week? Sad [], Happy [], Stressed [], Tired [], Content [], NA [], other NOT ANSWERED             Medications reconciled by nurse Yes [x]  No[]    Patient was given therapeutic recommendations for any noted side effects of their dietary approach based upon [facility] patient manual per providers recommendation.

## 2022-10-26 NOTE — PROGRESS NOTES
[facility] Progress Note:   F/up Physician Visit    Barbara Bartholomew is a 44 y.o. male who was seen by synchronous (real-time) audio-video technology on 10/26/2022. Consent:  He and/or his healthcare decision maker is aware that this patient-initiated Telehealth encounter is a billable service, with coverage as determined by his insurance carrier. He is aware that he may receive a bill and has provided verbal consent to proceed: Yes    I was at home while conducting this encounter. 712  Subjective:   Barbara Nesbitt was seen for Weight Management    f/up physician visit for the maintenance Program.      Prior to Admission medications    Medication Sig Start Date End Date Taking? Authorizing Provider   ammonium lactate (LAC-HYDRIN) 12 % lotion APPLY TO LEGS TWICE DAILY. 8/30/22  Yes Provider, Historical   fluocinoNIDE (LIDEX) 0.05 % topical cream Apply  to affected area two (2) times a day. 7/12/22  Yes Jairon Armstrong MD   multivitamins chew Take 2 Each by mouth daily. Vitafsion Mens Daily    Yes Provider, Historical     Allergies   Allergen Reactions    Doxycycline Other (comments)     Burning throat and ears       Patient Active Problem List    Diagnosis Date Noted    Chest pain 10/27/2017    Morbidly obese ([address] 10/27/2017     Current Outpatient Medications   Medication Sig Dispense Refill    ammonium lactate (LAC-HYDRIN) 12 % lotion APPLY TO LEGS TWICE DAILY. fluocinoNIDE (LIDEX) 0.05 % topical cream Apply  to affected area two (2) times a day. 15 g 0    multivitamins chew Take 2 Each by mouth daily.  Vitafsion Mens Daily          ROS      CC: Weight Management      Barbara Bartholomew is a 44 y.o. male who is here for his      Weight Metrics 10/3/2022 9/20/2022 9/20/2022 9/12/2022 8/29/2022 8/16/2022 8/16/2022   Weight 198 lb 11.2 oz - 199 lb 6.4 oz 201 lb 3.2 oz 198 lb 12.8 oz - 200 lb 3.2 oz   Neck Circ (inches) - 14.75 - - - 14.75 -   Waist Measure Inches - 38 - - - 38 -   Body Fat % - 26.8 - - - 26.9 -   BMI 30.21 kg/m2 - 30.32 kg/m2 30.59 kg/m2 30.23 kg/m2 - 30.44 kg/m2         Outpatient Medications Marked as Taking for the 10/26/22 encounter (Virtual Visit) with Brad Lovell MD   Medication Sig Dispense Refill    ammonium lactate (LAC-HYDRIN) 12 % lotion APPLY TO LEGS TWICE DAILY. fluocinoNIDE (LIDEX) 0.05 % topical cream Apply  to affected area two (2) times a day. 15 g 0    multivitamins chew Take 2 Each by mouth daily. Vitafsion Mens Daily            Participation   Did you attend clinic and class last week? no    Review of Systems  Since your last visit, have you experienced any complications? no  If yes, please list:       Are you taking an appetite suppressant? no  If so, is there any Chest Pain, Palpitations or Dizziness? HUNGER CONTROL: good    BP Readings from Last 3 Encounters:   10/03/22 103/69   09/20/22 105/69   09/12/22 106/69       SLEEP:  7     Have you received any other medical care this week? no  If yes, where and for what? Have you discontinued or changed any medicine or dose of your medicine since your last visit with Dr Sade Anna? no  If yes, where and for what? Diet  How many ounces of calorie-free liquids did you consume each day?  80 oz    How many meal replacements did you take each day? 1 and 2 meals as maintenance    Did you have any problems adhering to the program?  no If yes, please explain:      Exercise  Aerobic exercise: 30  min calestentics 3 days, TM 3 miles on the other days 4 days   Resistance exercise: 3 workouts / week  Any discomfort?  no     If yes, where? Objective  There were no vitals taken for this visit. No LMP for male patient. PHYSICAL EXAMINATION:  [ INSTRUCTIONS:  \"[x]\" Indicates a positive item  \"[]\" Indicates a negative item  -- DELETE ALL ITEMS NOT EXAMINED]  Vital Signs: (As obtained by patient/caregiver at home)  There were no vitals taken for this visit. Constitutional: [x] Appears well-developed and well-nourished [x] No apparent distress      [] Abnormal -     Mental status: [x] Alert and awake  [x] Oriented to person/place/time [x] Able to follow commands    [] Abnormal -     Eyes:   EOM    [x]  Normal    [] Abnormal -   Sclera  [x]  Normal    [] Abnormal -          Discharge [x]  None visible   [] Abnormal -     HENT: [x] Normocephalic, atraumatic  [] Abnormal -   [x] Mouth/Throat: Mucous membranes are moist    External Ears [x] Normal  [] Abnormal -    Neck: [x] No visualized mass [] Abnormal -     Pulmonary/Chest: [x] Respiratory effort normal   [x] No visualized signs of difficulty breathing or respiratory distress        [] Abnormal -      Musculoskeletal:   [x] Normal gait with no signs of ataxia         [x] Normal range of motion of neck        [] Abnormal -     Neurological:        [x] No Facial Asymmetry (Cranial nerve 7 motor function) (limited exam due to video visit)          [x] No gaze palsy        [] Abnormal -          Skin:        [x] No significant exanthematous lesions or discoloration noted on facial skin         [] Abnormal -            Psychiatric:       [x] Normal Affect [] Abnormal -        [x] No Hallucinations    Other pertinent observable physical exam findings:-      Assessment / Plan    Encounter Diagnoses   Name Primary? Obesity (BMI 30-39. 9) Yes    Hypercholesterolemia     GUDELIA (obstructive sleep apnea)      Diagnoses and all orders for this visit:    1. Obesity (BMI 30-39. 9)  Cont 1 shake a day   Exercise is good, 5 days a week  Sleep is good at 7 hours  Recheck in 1 month then start stretching visits out more    2. Hypercholesterolemia  Recheck in nov, was nl on last check  3. GUDELIA (obstructive sleep apnea)  Contine cpap, he feels better when he uses it  1. Weight management improved   Progress was reviewed with patient    2.   Labs    Latest results reviewed with patient          face to face time with Leanne Khan consisted of counseling & coordinating and/or discussing treatment plans in reference to his obesity The primary encounter diagnosis was Obesity (BMI 30-39.9). Diagnoses of Hypercholesterolemia and GUDELIA (obstructive sleep apnea) were also pertinent to this visit. Coding Help - Use CPT Codes 88805-07774, 62570-32529 for Established and New Patients respectively, either employing EM elements or Time rules. Other codes (example consult codes) may also apply. We discussed the expected course, resolution and complications of the diagnosis(es) in detail. Medication risks, benefits, costs, interactions, and alternatives were discussed as indicated. I advised him to contact the office if his condition worsens, changes or fails to improve as anticipated. He expressed understanding with the diagnosis(es) and plan. Pursuant to the emergency declaration under the [facility], [facility] waiver authority and the [employer] Act, this Virtual  Visit was conducted, with patient's consent, to reduce the patient's risk of exposure to COVID-19 and provide continuity of care for an established patient. Services were provided through a video synchronous discussion virtually to substitute for in-person clinic visit.     Eusebio Yuan MD

## 2022-10-26 NOTE — PROGRESS NOTES
Weight Management. 1 month follow up. Virtual Visit. 1. Have you been to the ER, urgent care clinic since your last visit? Hospitalized since your last visit? No    2. Have you seen or consulted any other health care providers outside of the [address] [place] [address] since your last visit? Include any pap smears or colon screening.  No

## 2022-10-31 NOTE — PROGRESS NOTES
Nurse note from patient's weekly  LCD / Maintenance class was reviewed. Pertinent medical concerns were:   reviewed     BP Readings from Last 3 Encounters:   10/03/22 103/69   09/20/22 105/69   09/12/22 106/69       Weight Metrics 10/3/2022 9/20/2022 9/20/2022 9/12/2022 8/29/2022 8/16/2022 8/16/2022   Weight 198 lb 11.2 oz - 199 lb 6.4 oz 201 lb 3.2 oz 198 lb 12.8 oz - 200 lb 3.2 oz   Neck Circ (inches) - 14.75 - - - 14.75 -   Waist Measure Inches - 38 - - - 38 -   Body Fat % - 26.8 - - - 26.9 -   BMI 30.21 kg/m2 - 30.32 kg/m2 30.59 kg/m2 30.23 kg/m2 - 30.44 kg/m2       Current Outpatient Medications   Medication Sig Dispense Refill    ammonium lactate (LAC-HYDRIN) 12 % lotion APPLY TO LEGS TWICE DAILY. fluocinoNIDE (LIDEX) 0.05 % topical cream Apply  to affected area two (2) times a day. 15 g 0    multivitamins chew Take 2 Each by mouth daily.  Vitafsion Mens Daily

## 2022-11-03 ENCOUNTER — CLINICAL SUPPORT (OUTPATIENT)
Dept: SURGERY | Age: 37
End: 2022-11-03

## 2022-11-03 DIAGNOSIS — E66.9 OBESITY (BMI 30-39.9): Primary | ICD-10-CM

## 2022-11-03 NOTE — PROGRESS NOTES
[facility]  Metabolic Program Follow-up Nutrition Consult    Date: 11/3/2022   Physician: Jarvis Dunham MD  Name: Geronimo Mayen. :  1985    Type of Plan: LCD to maintenance   Weeks on Plan: > 1 year  Virtual visit was completed through [address] [address]. ASSESSMENT:    Medications/Supplements:   Prior to Admission medications    Medication Sig Start Date End Date Taking? Authorizing Provider   ammonium lactate (LAC-HYDRIN) 12 % lotion APPLY TO LEGS TWICE DAILY. 22   Provider, Historical   fluocinoNIDE (LIDEX) 0.05 % topical cream Apply  to affected area two (2) times a day. 22   Nayeli Almaraz MD   multivitamins chew Take 2 Each by mouth daily. Vitafsion Mens Daily     Provider, Historical              Starting Weight: 310#   Current Weight: 198# (198# last visit two weeks ago)  Overall Pounds Lost: 112#  Overall Pounds Gained: 0  Self report weight 195#, original goal was 190# but plans to continue another 5-10? #    Exercise/Physical Activity:  6 month of cardio/calisthenics. Adding resistance training in next few months. Is patient using [facility] products:yes  If yes, how many per day: 1-2    Aversions/side effects of product/program:none    Fluids used to mix with products:water    Reported Diet History: reduced one ND product daily and added one meal for a total of 1 ND product and two meals per day. As he introduced more food, reports his stomach was unsettled but feeling better this week. Increasing carbs such as beans, peaches, rice dishes, small portions of this daily. Keeping lunch 200-400 calories. Adding salads.       Barriers/concerns preventing patient from achieving goal(s) since last visit: none, wife supportive    NUTRITION INTERVENTION:  Pt educated on nutrition recommendations for the [facility] Low Calorie Plan (LCD), with the specific meal pattern of   100-120 grams protein per day because of 1.2-1.4 grams protein/kg body weight to assist with bulking. If wanting to still lose weight, keep calories less than 1500 per day until ready to hit goal.  Increase carbs to 100 grams per day of complex choices. Grocery meal:  Use the balanced plate method to plan meals, include 3-6 oz of lean source of protein, unlimited non-starchy vegetables, 1/2 cup whole grains/beans OR 1/2 cup fruit OR 1 serving of low fat dairy. Utilize handouts listing healthy snack and meal ideas. Read all nutrition labels. Demonstrated and emphasized identifying serving size, total fat, sugar and protein content. Defined low fat as </= 3 g per serving. Discussed lean and extra lean sources of protein. Avoid foods with sugar listed in the first 3 ingredients and >/10 g sugar per serving. Consume meal replacements every three to four hours or pattern as discussed with provider. Mix with water. May add herbs/spices for taste. Practice mindful eating habits; take small bites, chew thoroughly, avoid distractions, utilize hunger/fullness scale. Reviewed attendance policy of attending weekly nutrition classes. Metabolic support group recommended, and increase physical activity (approved per MD) for long term weight maintenance. NUTRITION MONITORING AND EVALUATION: Since last visit, he has added a second meal and reduced by a ND product without incident or negatively impacting weight loss  He is interested in learning a meal plan on how to gain more muscle at next visit. Gave general macro goals to start achieving higher performance in the gym by adjusting complex carbs and protein. Add healthy fats to meal prep to total 1500 calories since REE is ~1700 calories per day. Will followup in one month after holiday and to see how adjusting macros impacts weight loss. Adherence very likely. Specific tips and techniques to facilitate compliance with above recommendations were provided and discussed.    If further details are desired please contact me at [phone]. This phone number was also provided to the patient for any further questions or concerns.           Manpreet Telles MS, RD, LDN

## 2022-12-01 ENCOUNTER — CLINICAL SUPPORT (OUTPATIENT)
Dept: SURGERY | Age: 37
End: 2022-12-01

## 2022-12-01 DIAGNOSIS — E66.9 OBESITY (BMI 30-39.9): Primary | ICD-10-CM

## 2022-12-01 NOTE — PROGRESS NOTES
[facility]  Metabolic Program Follow-up Nutrition Consult    Date: 2022   Physician: Neo Solano MD  Name: Joseph Sullivan. :  1985    Type of Plan: maintenance  Weeks on Plan: LCD one year, maintenance 2 months  Virtual visit was completed through [employer]. ASSESSMENT:    Medications/Supplements:   Prior to Admission medications    Medication Sig Start Date End Date Taking? Authorizing Provider   ammonium lactate (LAC-HYDRIN) 12 % lotion APPLY TO LEGS TWICE DAILY. 22   Provider, Historical   fluocinoNIDE (LIDEX) 0.05 % topical cream Apply  to affected area two (2) times a day. 22   Telma Thomas MD   multivitamins chew Take 2 Each by mouth daily. Vitafsion Mens Daily     Provider, Historical              Starting Weight: 310#   Current Weight: 198#  Overall Pounds Lost: 112#  Overall Pounds Gained: 0  Self report weight 197#    Exercise/Physical Activity: calisthenics Mon/Wed, walking on treadmill about 1 hour for 3miles /.  Sat/Sun rest    Is patient using [facility] products:yes  If yes, how many per day:2    Aversions/side effects of product/program:none    Fluids used to mix with products:water    Reported Diet History: 1000 calories. Breakfast: ND shake in morning  Lunch: salad with chicken or keto bread/wheat or turkey sandwich with carrots  Dinner : cauliflower rice, chicken, cauliflower pizza, zucchini noodles with marinara. Keeping meals between 200-400 calories  Snacks: baby carrots, keto snacks, fruit cup with pineapple or grapefruit  Beverages: water - 80 ounces    Feeling full, feeling satisfied, getting enough variety. Barriers/concerns preventing patient from achieving goal(s) since last visit: feeling hot an hour after dinner, not daily, but if continues encouraged him to report this to provider, pt verbalized understanding.     NUTRITION INTERVENTION:  Pt educated on nutrition recommendations for the [facility] maintenance plan  Increase calories from 5392-6584 to 4047-8133 to start slowing weight loss   grams complex carbs, spread throughout the day (1.0g carb/kg body weight)  130+ grams protein, (1.2-1.5g protein/kd body weight)  Remaining calories as healthy fats    Grocery meal:  Use the balanced plate method to plan meals, include 3-6 oz of lean source of protein, unlimited non-starchy vegetables, 1/2 cup whole grains/beans OR 1/2 cup fruit OR 1 serving of low fat dairy. Utilize handouts listing healthy snack and meal ideas. Read all nutrition labels. Demonstrated and emphasized identifying serving size, total fat, sugar and protein content. Defined low fat as </= 3 g per serving. Discussed lean and extra lean sources of protein. Avoid foods with sugar listed in the first 3 ingredients and >/10 g sugar per serving. Consume meal replacements every three to four hours or pattern as discussed with provider. Mix with water. May add herbs/spices for taste. Practice mindful eating habits; take small bites, chew thoroughly, avoid distractions, utilize hunger/fullness scale. Reviewed attendance policy of attending weekly nutrition classes. Metabolic support group recommended, and increase physical activity (approved per MD) for long term weight maintenance. NUTRITION MONITORING AND EVALUATION: meeting goals, weight steady around 198+/- 1 pound last few weeks. Pt ready to start transitioning from weight loss to maintaining with increasing exercise for adding lean muscle. Macros and calories modified. Followup one month to assess weight trends and exercise performance. Adherence likely. The following goals were established with patient  1) 1 grams carb/kg body weight for exercise and to start slowly stopping weight loss.  grams carbs per day, complex carbs. Have with meal and/or 10-20 grams fruit low fiber carb 1-2 hours before exercise for burst of energy.   2) 1.2-1.5 grams protein/kg body weight for bulking muscle. 108-135 grams protein per day  3) add 1/2 packet ND shake to morning shake for 300 calories and 40 grams protein   4) water 80+ ounces per day          Specific tips and techniques to facilitate compliance with above recommendations were provided and discussed. If further details are desired please contact me at [phone]. This phone number was also provided to the patient for any further questions or concerns.           Lucy Ramey, MS, RD, LDN

## 2023-01-05 ENCOUNTER — CLINICAL SUPPORT (OUTPATIENT)
Dept: SURGERY | Age: 38
End: 2023-01-05

## 2023-01-05 DIAGNOSIS — E66.9 OBESITY (BMI 30-39.9): Primary | ICD-10-CM

## 2023-01-05 NOTE — PROGRESS NOTES
[facility]  Metabolic Program Follow-up Nutrition Consult    Date: 2023   Physician: Jarvis Dunham MD  Name: Geronimo Mayen. :  1985    Type of Plan: maintenance   Weeks on Plan: LCD one year, maintenance 3 months  Virtual visit was completed through [address] [place] [address]. ASSESSMENT:    Medications/Supplements:   Prior to Admission medications    Medication Sig Start Date End Date Taking? Authorizing Provider   ammonium lactate (LAC-HYDRIN) 12 % lotion APPLY TO LEGS TWICE DAILY. 22   Provider, Historical   fluocinoNIDE (LIDEX) 0.05 % topical cream Apply  to affected area two (2) times a day. 22   Nayeli Almaraz MD   multivitamins chew Take 2 Each by mouth daily. Vitafsion Mens Daily     Provider, Historical              Starting Weight: 310#   Current Weight: 198# (198# last visit one month ago)  Overall Pounds Lost: 112#  Overall Pounds Gained: 0  Self report weight 197#    Exercise/Physical Activity:daily. 40 min 5 days per week with rest day on . MWF 30 minutes calisthenics. TuThurSat treadmill brisk pace 40 min-1hr. An example of his calisthenics workout  Push ups for 5 minutes. Split squats 5 min. Rows 5 min. Knee ups 5 min. Repeat. Is patient using [facility] products:yes  If yes, how many per day:1-2    Aversions/side effects of product/program:none    Fluids used to mix with products:water    Reported Diet History:having fun trying new foods. Increasing carbs with no gain in weight. Still feeling hot and nauseous at random, can't identify any foods causing this because so sporadic. Tried sushi, water aaron since last visit. Averaging 1000 calories. Recall:   ND Shake for breakfast  Cauliflower pizza with chicken. Salad with caesar dressing and fruit. Beverages: water 80 ounces per day, tried one cream soda since last visit.     Barriers/concerns preventing patient from achieving goal(s) since last visit: none, still working towards goal of bulking and toning. NUTRITION INTERVENTION:  Pt educated on nutrition recommendations for the [facility] maintenance plan  Increase calories from 0637-9214 to 7261-6740 to continue slowing of weight loss   grams complex carbs, spread throughout the day (1.0g carb/kg body weight)  130+ grams protein, (1.2-1.5g protein/kd body weight)  Remaining calories as healthy fats    Grocery meal:  Use the balanced plate method to plan meals, include 3-6 oz of lean source of protein, unlimited non-starchy vegetables, 1/2 cup whole grains/beans OR 1/2 cup fruit OR 1 serving of low fat dairy. Utilize handouts listing healthy snack and meal ideas. Read all nutrition labels. Demonstrated and emphasized identifying serving size, total fat, sugar and protein content. Defined low fat as </= 3 g per serving. Discussed lean and extra lean sources of protein. Avoid foods with sugar listed in the first 3 ingredients and >/10 g sugar per serving. Practice mindful eating habits; take small bites, chew thoroughly, avoid distractions, utilize hunger/fullness scale. Reviewed attendance policy of attending weekly nutrition classes. Metabolic support group recommended, and increase physical activity (approved per MD) for long term weight maintenance. NUTRITION MONITORING AND EVALUATION: Pt doing well in maintenance, having achieved current weight for 3 months. Reviewed macros and calories, no changes in nutrition plan at this time. Pt remains motivated. Followup one month. Specific tips and techniques to facilitate compliance with above recommendations were provided and discussed. If further details are desired please contact me at [phone]. This phone number was also provided to the patient for any further questions or concerns.           Harish Dodson, MS, RD, LDN

## 2023-01-18 ENCOUNTER — TELEPHONE (OUTPATIENT)
Dept: FAMILY MEDICINE CLINIC | Age: 38
End: 2023-01-18

## 2023-01-18 ENCOUNTER — VIRTUAL VISIT (OUTPATIENT)
Dept: SURGERY | Age: 38
End: 2023-01-18
Payer: COMMERCIAL

## 2023-01-18 DIAGNOSIS — E66.9 OBESITY (BMI 30-39.9): ICD-10-CM

## 2023-01-18 DIAGNOSIS — G47.33 OSA (OBSTRUCTIVE SLEEP APNEA): ICD-10-CM

## 2023-01-18 DIAGNOSIS — E78.00 HYPERCHOLESTEROLEMIA: ICD-10-CM

## 2023-01-18 DIAGNOSIS — T78.1XXA ADVERSE FOOD REACTION, INITIAL ENCOUNTER: Primary | ICD-10-CM

## 2023-01-18 NOTE — PROGRESS NOTES
Weight Management. 1 month follow up. 1. Have you been to the ER, urgent care clinic since your last visit? Hospitalized since your last visit? No    2. Have you seen or consulted any other health care providers outside of the [address] [place] [address] since your last visit? Include any pap smears or colon screening.  No

## 2023-01-18 NOTE — PROGRESS NOTES
[facility] Progress Note:   F/up Physician Visit    Abhishek Bonilla is a 43 y.o. male who was seen by synchronous (real-time) audio-video technology on 1/18/2023. Consent:  He and/or his healthcare decision maker is aware that this patient-initiated Telehealth encounter is a billable service, with coverage as determined by his insurance carrier. He is aware that he may receive a bill and has provided verbal consent to proceed: Yes    I was at home while conducting this encounter. 712  Subjective:   Abhishek Hayes was seen for Weight Management    f/up physician visit for the  LCD Program.  He is in maintenance but has lost 2 lbs since kim last visit in October  He c/o having hot flashes 20 min after eating  There is no consistent food associated with it      Prior to Admission medications    Medication Sig Start Date End Date Taking? Authorizing Provider   ammonium lactate (LAC-HYDRIN) 12 % lotion APPLY TO LEGS TWICE DAILY. 8/30/22  Yes Provider, Historical   fluocinoNIDE (LIDEX) 0.05 % topical cream Apply  to affected area two (2) times a day. 7/12/22  Yes Colby Turcios MD   multivitamins chew Take 2 Each by mouth daily. Vitafsion Mens Daily    Yes Provider, Historical     Allergies   Allergen Reactions    Doxycycline Other (comments)     Burning throat and ears       Patient Active Problem List    Diagnosis Date Noted    Chest pain 10/27/2017    Morbidly obese ([address] 10/27/2017     Current Outpatient Medications   Medication Sig Dispense Refill    ammonium lactate (LAC-HYDRIN) 12 % lotion APPLY TO LEGS TWICE DAILY. fluocinoNIDE (LIDEX) 0.05 % topical cream Apply  to affected area two (2) times a day. 15 g 0    multivitamins chew Take 2 Each by mouth daily.  Vitafsion Mens Daily          ROS      CC: Weight Management      Abhishek Bonilla is a 43 y.o. male who is here for his      Weight Metrics 10/3/2022 9/20/2022 9/20/2022 9/12/2022 8/29/2022 8/16/2022 8/16/2022   Weight 198 lb 11.2 oz - 199 lb 6.4 oz 201 lb 3.2 oz 198 lb 12.8 oz - 200 lb 3.2 oz   Neck Circ (inches) - 14.75 - - - 14.75 -   Waist Measure Inches - 38 - - - 38 -   Body Fat % - 26.8 - - - 26.9 -   BMI 30.21 kg/m2 - 30.32 kg/m2 30.59 kg/m2 30.23 kg/m2 - 30.44 kg/m2         Outpatient Medications Marked as Taking for the 1/18/23 encounter (Virtual Visit) with Shyann Acevedo MD   Medication Sig Dispense Refill    ammonium lactate (LAC-HYDRIN) 12 % lotion APPLY TO LEGS TWICE DAILY. fluocinoNIDE (LIDEX) 0.05 % topical cream Apply  to affected area two (2) times a day. 15 g 0    multivitamins chew Take 2 Each by mouth daily. Vitafsion Mens Daily            Participation   Did you attend clinic and class last week? yes    Review of Systems  Since your last visit, have you experienced any complications? no  If yes, please list:     Are you taking an appetite suppressant? no  If so, is there any Chest Pain, Palpitations or Dizziness? HUNGER CONTROL: good    BP Readings from Last 3 Encounters:   10/03/22 103/69   09/20/22 105/69   09/12/22 106/69       SLEEP:  7    Have you received any other medical care this week? no  If yes, where and for what? Have you discontinued or changed any medicine or dose of your medicine since your last visit with Dr Sangeetha Lynne? no  If yes, where and for what? Diet  How many ounces of calorie-free liquids did you consume each day?  80 oz    How many meal replacements did you take each day? 1-2    Did you have any problems adhering to the program?  no If yes, please explain:      Exercise  Aerobic exercise: 5-6  min  Resistance exercise:  workouts / week  Any discomfort? If yes, where? Objective  There were no vitals taken for this visit. No LMP for male patient.                     PHYSICAL EXAMINATION:  [ INSTRUCTIONS:  \"[x]\" Indicates a positive item  \"[]\" Indicates a negative item  -- DELETE ALL ITEMS NOT EXAMINED]  Vital Signs: (As obtained by patient/caregiver at home)  There were no vitals taken for this visit. Constitutional: [x] Appears well-developed and well-nourished [x] No apparent distress      [] Abnormal -     Mental status: [x] Alert and awake  [x] Oriented to person/place/time [x] Able to follow commands    [] Abnormal -     Eyes:   EOM    [x]  Normal    [] Abnormal -   Sclera  [x]  Normal    [] Abnormal -          Discharge [x]  None visible   [] Abnormal -     HENT: [x] Normocephalic, atraumatic  [] Abnormal -   [x] Mouth/Throat: Mucous membranes are moist    External Ears [x] Normal  [] Abnormal -    Neck: [x] No visualized mass [] Abnormal -     Pulmonary/Chest: [x] Respiratory effort normal   [x] No visualized signs of difficulty breathing or respiratory distress        [] Abnormal -      Musculoskeletal:   [x] Normal gait with no signs of ataxia         [x] Normal range of motion of neck        [] Abnormal -     Neurological:        [x] No Facial Asymmetry (Cranial nerve 7 motor function) (limited exam due to video visit)          [x] No gaze palsy        [] Abnormal -          Skin:        [x] No significant exanthematous lesions or discoloration noted on facial skin         [] Abnormal -            Psychiatric:       [x] Normal Affect [] Abnormal -        [x] No Hallucinations    Other pertinent observable physical exam findings:-      Assessment / Plan    Encounter Diagnoses   Name Primary? Adverse food reaction, initial encounter Yes    Obesity (BMI 30-39. 9)     Hypercholesterolemia     GUDELIA (obstructive sleep apnea)      Diagnoses and all orders for this visit:    1. Obesity (BMI 30-39. 9)  On maintenance  Doing well  Recheck in 3 months  Cont checking in here at triage visit  2. Adverse food reaction, initial encounter  -     REFERRAL TO ALLERGY  Might be a food allergy causing the hot flash after eating  3. Hypercholesterolemia  No meds needed  4. GUDELIA (obstructive sleep apnea)  Using cpap  1.   Weight management improved   Progress was reviewed with patient    2. Labs    Latest results reviewed with patient         face to face time with Dixie Maia consisted of counseling & coordinating and/or discussing treatment plans in reference to his obesity The primary encounter diagnosis was Adverse food reaction, initial encounter. Diagnoses of Obesity (BMI 30-39.9), Hypercholesterolemia, and GUDELIA (obstructive sleep apnea) were also pertinent to this visit. Coding Help - Use CPT Codes 91840-88606, 46518-60039 for Established and New Patients respectively, either employing EM elements or Time rules. Other codes (example consult codes) may also apply. We discussed the expected course, resolution and complications of the diagnosis(es) in detail. Medication risks, benefits, costs, interactions, and alternatives were discussed as indicated. I advised him to contact the office if his condition worsens, changes or fails to improve as anticipated. He expressed understanding with the diagnosis(es) and plan. Pursuant to the emergency declaration under the [facility], [facility] waiver authority and the [employer] Act, this Virtual  Visit was conducted, with patient's consent, to reduce the patient's risk of exposure to COVID-19 and provide continuity of care for an established patient. Services were provided through a video synchronous discussion virtually to substitute for in-person clinic visit.     Naresh Mo MD

## 2023-02-09 ENCOUNTER — CLINICAL SUPPORT (OUTPATIENT)
Dept: SURGERY | Age: 38
End: 2023-02-09

## 2023-02-09 DIAGNOSIS — E66.9 OBESITY (BMI 30-39.9): Primary | ICD-10-CM

## 2023-02-09 NOTE — PROGRESS NOTES
[facility]  Metabolic Program Follow-up Nutrition Consult    Date: 2023   Physician: Didi Hooper MD  Name: Radha Marinelli. :  1985    Type of Plan: maintenance   Weeks on Plan: LCD one year, maintenance 4 months  Virtual visit was completed through [address] [place] [address]. ASSESSMENT:    Medications/Supplements:   Prior to Admission medications    Medication Sig Start Date End Date Taking? Authorizing Provider   ammonium lactate (LAC-HYDRIN) 12 % lotion APPLY TO LEGS TWICE DAILY. 22   Provider, Historical   fluocinoNIDE (LIDEX) 0.05 % topical cream Apply  to affected area two (2) times a day. 22   Neo Hare MD   multivitamins chew Take 2 Each by mouth daily. Vitafsion Mens Daily     Provider, Historical              Starting Weight: 310#   Current Weight: 198#  Overall Pounds Lost: 112#  Overall Pounds Gained: 0  200# today, last week 198#, fluctuates between 195-198#. Wants to stay around 198# -     Exercise/Physical Activity: daily alternating cardio, circuit, and strength training. Injured shoulder last week so less days than normal this past week. Is patient using [facility] products:yes  If yes, how many per day:1-2    Aversions/side effects of product/program:none    Fluids used to mix with products: water    Reported Diet History:  cutting back on portions and types of foods, found his quantity was getting large again. Trying new foods such as keto pasta. Adding more complex carbs slowly. Meals last few days: [place] food, angle [employer] keto based pasta with grilled chicken, caesar salad. Days of exercising adds extra protein and carbs - baked fish, mashed potatoes    For his birthday had pizza but not in excess and didn't trigger him to want more high fat refined carb options. Barriers/concerns preventing patient from achieving goal(s) since last visit: worried about weight trends past 5# fluctuations.       NUTRITION INTERVENTION:  Pt educated on nutrition recommendations for the [facility] maintenance plan  Increase calories from 9633-6412 to 6339-4130 to continue slowing of weight loss   grams complex carbs, spread throughout the day (1.0g carb/kg body weight)  130+ grams protein, (1.2-1.5g protein/kd body weight)  Remaining calories as healthy fats       Grocery meal:  Use the balanced plate method to plan meals, include 3-6 oz of lean source of protein, unlimited non-starchy vegetables, 1/2 cup whole grains/beans OR 1/2 cup fruit OR 1 serving of low fat dairy. Utilize handouts listing healthy snack and meal ideas. Read all nutrition labels. Demonstrated and emphasized identifying serving size, total fat, sugar and protein content. Defined low fat as </= 3 g per serving. Discussed lean and extra lean sources of protein. Avoid foods with sugar listed in the first 3 ingredients and >/10 g sugar per serving. Consume meal replacements every three to four hours or pattern as discussed with provider. Mix with water. May add herbs/spices for taste. Practice mindful eating habits; take small bites, chew thoroughly, avoid distractions, utilize hunger/fullness scale. Reviewed attendance policy of attending weekly nutrition classes. Metabolic support group recommended, and increase physical activity (approved per MD) for long term weight maintenance. NUTRITION MONITORING AND EVALUATION: Maintaining weight within +/-2.5% for 4 months. No change in nutrition care at this time. Followup after allergy specialist to discuss any results as it relates to heat flashes after meals. Pt remains motivated, adherence likely. Specific tips and techniques to facilitate compliance with above recommendations were provided and discussed. If further details are desired please contact me at [phone]. This phone number was also provided to the patient for any further questions or concerns.           Amrita Damon, MS, RD, LDN

## 2023-03-15 ENCOUNTER — CLINICAL SUPPORT (OUTPATIENT)
Dept: SURGERY | Age: 38
End: 2023-03-15

## 2023-03-15 DIAGNOSIS — E66.9 OBESITY (BMI 30-39.9): Primary | ICD-10-CM

## 2023-03-15 NOTE — PROGRESS NOTES
[facility]  Metabolic Program Follow-up Nutrition Consult    Date: 3/15/2023   Physician: Caitlin Smith MD  Name: Mayela Lamb. :  1985    Type of Plan: Maintenance  Weeks on Plan: LCD one year, maintenance 5 months  Virtual visit was completed through [address] [place] [address]. ASSESSMENT:    Medications/Supplements:   Prior to Admission medications    Medication Sig Start Date End Date Taking? Authorizing Provider   ammonium lactate (LAC-HYDRIN) 12 % lotion APPLY TO LEGS TWICE DAILY. 22   Provider, Historical   fluocinoNIDE (LIDEX) 0.05 % topical cream Apply  to affected area two (2) times a day. 22   Ludmila Bui MD   multivitamins chew Take 2 Each by mouth daily. Vitafsion Mens Daily     Provider, Historical              Starting Weight: 310#   Current Weight: 198# (200# self report weight)  Overall Pounds Lost: 112#  Overall Pounds Gained: 0    Exercise/Physical Activity: increasing intensity. calisthenics 3-4 days a week. Treadmill daily. Is patient using [employer] [facility] products:yes If yes, how many per day:1    Aversions/side effects of product/program:none    Fluids used to mix with products:water    Reported Diet History: heat flashes continues but no pattern has still emerged. Following allergist.     No new foods introduced this past time. Focusing on portions more. Hungrier more. Averaging 7260-1330 calories per day, more on high intensity workout days. 24 Hour Diet Recall  Breakfast  ND shake   Lunch  Cauliflower rice grilled chicken and salad   Dinner  Cauliflower rice peanut stew baked chicken   Snacks  Carrots and hummus with protein   Beverages  Water only 80 ounces daily. Barriers/concerns preventing patient from achieving goal(s) since last visit: food allergy specialist visit, doesn't have any food allergies.  Heat flashes continues    NUTRITION INTERVENTION:  Pt educated on nutrition recommendations for the [facility] maintenance plan  Increase calories from 8704-0112 to 8030-0235 to continue slowing of weight loss   grams complex carbs, spread throughout the day (1.0g carb/kg body weight)  130+ grams protein, (1.2-1.5g protein/kd body weight)  Remaining calories as healthy fats      Grocery meal:  Use the balanced plate method to plan meals, include 3-6 oz of lean source of protein, unlimited non-starchy vegetables, 1/2 cup whole grains/beans OR 1/2 cup fruit OR 1 serving of low fat dairy. Utilize handouts listing healthy snack and meal ideas. Read all nutrition labels. Demonstrated and emphasized identifying serving size, total fat, sugar and protein content. Defined low fat as </= 3 g per serving. Discussed lean and extra lean sources of protein. Avoid foods with sugar listed in the first 3 ingredients and >/10 g sugar per serving. Consume meal replacements every three to four hours or pattern as discussed with provider. Mix with water. May add herbs/spices for taste. Practice mindful eating habits; take small bites, chew thoroughly, avoid distractions, utilize hunger/fullness scale. Reviewed attendance policy of attending weekly nutrition classes. Metabolic support group recommended, and increase physical activity (approved per MD) for long term weight maintenance. NUTRITION MONITORING AND EVALUATION:Maintaining weight within +/-2.5% for 5 months. No change in nutrition care at this time. Pt remains motivated, adherence likely. The following goals were established with patient;  1) add electrolytes on excessively sweating days with exercise, for greater than 30-45 minutes        Specific tips and techniques to facilitate compliance with above recommendations were provided and discussed. If further details are desired please contact me at [phone]. This phone number was also provided to the patient for any further questions or concerns.           Solitario Brand, MS, RD, LDN

## 2023-04-06 ENCOUNTER — OFFICE VISIT (OUTPATIENT)
Dept: SURGERY | Age: 38
End: 2023-04-06
Payer: COMMERCIAL

## 2023-04-06 PROCEDURE — 99214 OFFICE O/P EST MOD 30 MIN: CPT | Performed by: FAMILY MEDICINE

## 2023-04-06 NOTE — PROGRESS NOTES
VERY LOW CALORIE DIET  MAINTENANCE        CC: Weight Management      Juan Haro. is a 43 y.o. male who is here for his  f/up physician visit for the maintenanceProgram.    Last seen in January virtually  In October was 80  Now 202    He has been meeting with the dietitian  He has been trying to build muscle  He is eating more carbs  Having more pastas and pea pastas  He is sleeping 7 hours sleep    Still having hot flashes once or twice a week  Associated with a meal    Also woke up with irregular heart beat this morning, it went away on its own. Only lasted a couple seconds  Normal now    Weight Metrics 4/6/2023 4/6/2023 10/3/2022 9/20/2022 9/20/2022 9/12/2022 8/29/2022   Weight - 202 lb 12.8 oz 198 lb 11.2 oz - 199 lb 6.4 oz 201 lb 3.2 oz 198 lb 12.8 oz   Neck Circ (inches) 14.5 - - 14.75 - - -   Waist Measure Inches 39.75 - - 38 - - -   Body Fat % 27.4 - - 26.8 - - -   BMI - 30.84 kg/m2 30.21 kg/m2 - 30.32 kg/m2 30.59 kg/m2 30.23 kg/m2         Outpatient Medications Marked as Taking for the 4/6/23 encounter (Office Visit) with Eduard Amos MD   Medication Sig Dispense Refill    ammonium lactate (LAC-HYDRIN) 12 % lotion APPLY TO LEGS TWICE DAILY. fluocinoNIDE (LIDEX) 0.05 % topical cream Apply  to affected area two (2) times a day. 15 g 0    multivitamins chew Take 2 Each by mouth daily. Vitafsion Mens Daily            Participation   Did you attend clinic and class last week? no    Review of Systems  Since your last visit, have you experienced any complications? no  If yes, please list:       Are you taking an appetite suppressant? no  If so, is there any Chest Pain, Palpitations or Dizziness? HUNGER CONTROL:good    BP Readings from Last 3 Encounters:   04/06/23 105/69   10/03/22 103/69   09/20/22 105/69       SLEEP: sleeping well 7 hours avg    Have you received any other medical care this week? no  If yes, where and for what?        Have you discontinued or changed any medicine or dose of your medicine since your last visit with Dr Efrain Aden? no  If yes, where and for what? Diet  How many ounces of calorie-free liquids did you consume each day?  80 oz    How many meal replacements did you take each day? 1 for breakfast    Did you have any problems adhering to the program?  no If yes, please explain:      Exercise  Aerobic exercise: 4-5 days 60-90min using a virtual program  Resistance exercise: 4-5 workouts / week  Any discomfort? If yes, where? Objective  Visit Vitals  /69 (BP 1 Location: Right arm, BP Patient Position: Sitting, BP Cuff Size: Large adult)   Pulse (!) 57   Temp 97.6 °F (36.4 °C) (Oral)   Resp 18   Ht 5' 8\" (1.727 m)   Wt 202 lb 12.8 oz (92 kg)   SpO2 99%   BMI 30.84 kg/m²     No LMP for male patient. Physical Exam  Appearance: well,   Mental:A&O x 3, NAD  H:NC/AT,  EENT:   EOMI, PERRL, No scleral icterus  Neck: no bruit or JVD  Lung: clear, No W/R  ABD: soft, active, nontender  Ext:  no Edema  Neuro: nonfocal  Assessment / Plan    Encounter Diagnoses   Name Primary? Obesity (BMI 30-39. 9) Yes    Flushing     Palpitations     Screening for diabetes mellitus      Diagnoses and all orders for this visit:    1. Obesity (BMI 30-39. 9)  Contnue one shake a day   Getting good resistance and cardio  Document all foods in [facility] adan to see if there is more sugar in his diet than he is aware of  Recheck in 1 month  2. Flushing  -     US ABD LTD; Future  -     METABOLIC PANEL, COMPREHENSIVE; Future  -     HEMOGLOBIN A1C WITH EAG; Future  -     INSULIN; Future  Since this happens after eating the allergist suggested checking a gallbladder us  I agree and will order that  3. Palpitations  -     METABOLIC PANEL, COMPREHENSIVE; Future  Happened once  If it happens again g to urgent care or ER to get an EKG at that moment  4. Screening for diabetes mellitus  -     HEMOGLOBIN A1C WITH EAG; Future  -     INSULIN; Future      1.   Weight management reasonably well controlled   Progress was reviewed with patient    2. Labs    Latest results reviewed with patient   Lab slip given to pt for f/up  labs       face to face time with Meng Brandt consisted of counseling & coordinating and/or discussing treatment plans in reference to his obesity The primary encounter diagnosis was Obesity (BMI 30-39.9). Diagnoses of Flushing, Palpitations, and Screening for diabetes mellitus were also pertinent to this visit.

## 2023-04-06 NOTE — PROGRESS NOTES
Weight Management. 1 month follow up. 1. Have you been to the ER, urgent care clinic since your last visit? Hospitalized since your last visit? No    2. Have you seen or consulted any other health care providers outside of the [address] [place] [address] since your last visit? Include any pap smears or colon screening.  No    BMI - 30.7

## 2023-04-14 ENCOUNTER — HOSPITAL ENCOUNTER (OUTPATIENT)
Dept: ULTRASOUND IMAGING | Age: 38
Discharge: HOME | End: 2023-04-14
Attending: FAMILY MEDICINE
Payer: COMMERCIAL

## 2023-04-14 DIAGNOSIS — R23.2 FLUSHING: ICD-10-CM

## 2023-04-14 PROCEDURE — 76705 ECHO EXAM OF ABDOMEN: CPT

## 2023-05-15 ENCOUNTER — OFFICE VISIT (OUTPATIENT)
Age: 38
End: 2023-05-15

## 2023-05-15 DIAGNOSIS — E66.01 MORBID (SEVERE) OBESITY DUE TO EXCESS CALORIES (HCC): Primary | ICD-10-CM

## 2023-05-15 NOTE — PROGRESS NOTES
lean muscle (which is most likely). Continue to monitor non scale measurements such as energy, clothes, sleep, strength, lab metrics, meds, etc. Focus on lean protein, increase per recommendations above, add to breakfast cereal meal or cut this out and stick to ND shake. No need to increase portion at dinner if not hungry for it. Take once a month pictures to help visually see the body transformation since he is worried scale is a few pounds up, yet he getting muscle definition in shoulders and back. Remains motivated, adherence likely, followup 2 months. Specific tips and techniques to facilitate compliance with above recommendations were provided and discussed. If further details are desired please contact me at [phone]. This phone number was also provided to the patient for any further questions or concerns.           Mireille Castle, MS, RD, LDN

## 2023-05-19 ENCOUNTER — TELEMEDICINE (OUTPATIENT)
Age: 38
End: 2023-05-19
Payer: COMMERCIAL

## 2023-05-19 VITALS
DIASTOLIC BLOOD PRESSURE: 63 MMHG | SYSTOLIC BLOOD PRESSURE: 92 MMHG | HEIGHT: 68 IN | WEIGHT: 204 LBS | HEART RATE: 62 BPM | BODY MASS INDEX: 30.92 KG/M2 | TEMPERATURE: 98.3 F

## 2023-05-19 DIAGNOSIS — E78.00 PURE HYPERCHOLESTEROLEMIA, UNSPECIFIED: ICD-10-CM

## 2023-05-19 DIAGNOSIS — G47.33 OBSTRUCTIVE SLEEP APNEA (ADULT) (PEDIATRIC): ICD-10-CM

## 2023-05-19 DIAGNOSIS — E66.09 CLASS 1 OBESITY DUE TO EXCESS CALORIES WITHOUT SERIOUS COMORBIDITY WITH BODY MASS INDEX (BMI) OF 30.0 TO 30.9 IN ADULT: Primary | ICD-10-CM

## 2023-05-19 PROCEDURE — 99214 OFFICE O/P EST MOD 30 MIN: CPT | Performed by: FAMILY MEDICINE

## 2023-05-19 PROCEDURE — G8427 DOCREV CUR MEDS BY ELIG CLIN: HCPCS | Performed by: FAMILY MEDICINE

## 2023-05-19 PROCEDURE — G8417 CALC BMI ABV UP PARAM F/U: HCPCS | Performed by: FAMILY MEDICINE

## 2023-05-19 PROCEDURE — 4004F PT TOBACCO SCREEN RCVD TLK: CPT | Performed by: FAMILY MEDICINE

## 2023-05-19 RX ORDER — TOPIRAMATE 25 MG/1
25 TABLET ORAL
Qty: 30 TABLET | Refills: 2 | Status: SHIPPED | OUTPATIENT
Start: 2023-05-19

## 2023-05-19 ASSESSMENT — PATIENT HEALTH QUESTIONNAIRE - PHQ9
SUM OF ALL RESPONSES TO PHQ QUESTIONS 1-9: 0
SUM OF ALL RESPONSES TO PHQ QUESTIONS 1-9: 0
SUM OF ALL RESPONSES TO PHQ9 QUESTIONS 1 & 2: 0
1. LITTLE INTEREST OR PLEASURE IN DOING THINGS: 0
SUM OF ALL RESPONSES TO PHQ QUESTIONS 1-9: 0
2. FEELING DOWN, DEPRESSED OR HOPELESS: 0
SUM OF ALL RESPONSES TO PHQ QUESTIONS 1-9: 0

## 2023-07-10 ENCOUNTER — OFFICE VISIT (OUTPATIENT)
Age: 38
End: 2023-07-10

## 2023-07-10 DIAGNOSIS — E66.01 MORBIDLY OBESE (HCC): Primary | ICD-10-CM

## 2023-07-10 NOTE — PROGRESS NOTES
[facility]  Metabolic Program Follow-up Nutrition Consult    Date: 7/10/2023   Physician: Dixon Khanna MD  Name: Donato Lopez. :  1985    Type of Plan: maintenance  Weeks on Plan: LCD 1 year, maintenance 8 months  Virtual visit was completed through [address] [facility] ASSESSMENT:    Medications/Supplements:   Prior to Admission medications    Medication Sig Start Date End Date Taking? Authorizing Provider   Multiple Vitamin (MULTI VITAMIN MENS PO) Take 2 each by mouth daily Mens daily chewable    Historical Provider, MD   topiramate (TOPAMAX) 25 MG tablet Take 1 tablet by mouth Daily with supper 23   Dixon Khanna MD   ammonium lactate (LAC-HYDRIN) 12 % lotion APPLY TO LEGS TWICE DAILY. 22   Ar Automatic Reconciliation   fluocinonide (LIDEX) 0.05 % cream Apply topically 2 times daily  Patient not taking: Reported on 2023   Ar Automatic Reconciliation              Starting Weight: 310#   Current Weight: 204# (202# last visit 2 months ago)  Overall Pounds Lost: 106#  Overall Pounds Gained: 0  203# today, notes that his waist is smaller    Exercise/Physical Activity: increased FIT, 3+ days a week. Similar to a cross fit program, or HIIT exercise. Is patient using [facility] products:yes  If yes, how many per day: 1-2    Aversions/side effects of product/program:no    Fluids used to mix with products:water    Reported Diet History:taking Topamax, helping with portions. Exercises a few hours after eating dinner. Snacks: hummus with carrots  Averages 100+ grams protein daily.   Water 100+ ounces daily      24 Hour Diet Recall  Breakfast  Magic spoon cereal 30 grams protein almond milk   Lunch  ND shake with water   Dinner  Chickpea noodles 15+ grams, grilled chicken 5-6 ounces, green beans, salad   Snacks  Salad after dinner   Beverages       Barriers/concerns preventing patient from achieving goal(s) since last visit: hungry after dinner    NUTRITION

## 2023-07-20 ENCOUNTER — OFFICE VISIT (OUTPATIENT)
Age: 38
End: 2023-07-20
Payer: COMMERCIAL

## 2023-07-20 VITALS
BODY MASS INDEX: 31.04 KG/M2 | OXYGEN SATURATION: 97 % | DIASTOLIC BLOOD PRESSURE: 71 MMHG | HEIGHT: 68 IN | WEIGHT: 204.8 LBS | TEMPERATURE: 97.9 F | HEART RATE: 60 BPM | RESPIRATION RATE: 16 BRPM | SYSTOLIC BLOOD PRESSURE: 110 MMHG

## 2023-07-20 DIAGNOSIS — E78.00 PURE HYPERCHOLESTEROLEMIA: ICD-10-CM

## 2023-07-20 DIAGNOSIS — E66.09 CLASS 1 OBESITY DUE TO EXCESS CALORIES WITH SERIOUS COMORBIDITY AND BODY MASS INDEX (BMI) OF 30.0 TO 30.9 IN ADULT: Primary | ICD-10-CM

## 2023-07-20 DIAGNOSIS — Z99.89 OSA ON CPAP: ICD-10-CM

## 2023-07-20 DIAGNOSIS — G47.33 OSA ON CPAP: ICD-10-CM

## 2023-07-20 PROCEDURE — G8417 CALC BMI ABV UP PARAM F/U: HCPCS | Performed by: FAMILY MEDICINE

## 2023-07-20 PROCEDURE — 99214 OFFICE O/P EST MOD 30 MIN: CPT | Performed by: FAMILY MEDICINE

## 2023-07-20 PROCEDURE — 1036F TOBACCO NON-USER: CPT | Performed by: FAMILY MEDICINE

## 2023-07-20 PROCEDURE — G8427 DOCREV CUR MEDS BY ELIG CLIN: HCPCS | Performed by: FAMILY MEDICINE

## 2023-07-20 ASSESSMENT — PATIENT HEALTH QUESTIONNAIRE - PHQ9
2. FEELING DOWN, DEPRESSED OR HOPELESS: 0
SUM OF ALL RESPONSES TO PHQ9 QUESTIONS 1 & 2: 0
SUM OF ALL RESPONSES TO PHQ QUESTIONS 1-9: 0
1. LITTLE INTEREST OR PLEASURE IN DOING THINGS: 0
SUM OF ALL RESPONSES TO PHQ QUESTIONS 1-9: 0

## 2023-07-20 NOTE — PROGRESS NOTES
VERY LOW CALORIE DIET  MAINTENANCE        CC: Weight Management      Carolina Leigh is a 43 y.o. male who is here for his  f/up physician visit for the  Program.    He is still meeting with the dietician monthly  He is working on building muscle  He has increased the crbs and protein  His waist has increased compared to septeber and since the last inperson visit              Weight Metrics 7/20/2023 5/19/2023 10/3/2022 9/20/2022 9/12/2022 8/29/2022 8/16/2022   Weight 204 lb 12.8 oz 204 lb 198 lb 11.2 oz 199 lb 6.4 oz 201 lb 3.2 oz 198 lb 12.8 oz 200 lb 3.2 oz   Neck (Inches) 14.25 - - - - - -   Waist Measure Inches 40.25 - - - - - -   Body Fat % 27.8 - - - - - -   BMI (Calculated) 31.2 kg/m2 31.1 kg/m2 30.3 kg/m2 30.4 kg/m2 30.7 kg/m2 30.3 kg/m2 30.5 kg/m2   No flowsheet data found. Current Outpatient Medications   Medication Sig Dispense Refill    Multiple Vitamin (MULTI VITAMIN MENS PO) Take 2 each by mouth daily Mens daily chewable      topiramate (TOPAMAX) 25 MG tablet Take 1 tablet by mouth Daily with supper 30 tablet 2    ammonium lactate (LAC-HYDRIN) 12 % lotion Apply topically as needed       No current facility-administered medications for this visit. Participation   Did you attend clinic and class last week? no    Review of Systems  Since your last visit, have you experienced any complications? no  If yes, please list:       Are you taking an appetite suppressant? yes  If so, is there any Chest Pain, Palpitations or Dizziness? HUNGER CONTROL:good    BP Readings from Last 3 Encounters:   07/20/23 110/71   05/19/23 92/63   10/03/22 103/69       SLEEP: 7    Have you received any other medical care this week? no  If yes, where and for what? Have you discontinued or changed any medicine or dose of your medicine since your last visit with Dr Candi Butler? no  If yes, where and for what?          Diet  How many ounces of calorie-free liquids did you consume each day?  80 oz    How many meal

## 2023-08-21 ENCOUNTER — OFFICE VISIT (OUTPATIENT)
Age: 38
End: 2023-08-21

## 2023-08-21 DIAGNOSIS — E66.09 CLASS 1 OBESITY DUE TO EXCESS CALORIES WITH SERIOUS COMORBIDITY AND BODY MASS INDEX (BMI) OF 30.0 TO 30.9 IN ADULT: Primary | ICD-10-CM

## 2023-08-21 NOTE — PROGRESS NOTES
[facility]  Metabolic Program Follow-up Nutrition Consult    Date: 2023   Physician: Brandon Wayne MD  Name: Bere Roth. :  1985    Type of Plan: maintenance  Weeks on Plan: LCD 1 year, maintenance 9 months  Virtual visit was completed through [address] [facility] ASSESSMENT:    Medications/Supplements:   Prior to Admission medications    Medication Sig Start Date End Date Taking? Authorizing Provider   Multiple Vitamin (MULTI VITAMIN MENS PO) Take 2 each by mouth daily Mens daily chewable    Historical Provider, MD   topiramate (TOPAMAX) 25 MG tablet Take 1 tablet by mouth Daily with supper 23   Brandon Wayne MD   ammonium lactate (LAC-HYDRIN) 12 % lotion Apply topically as needed 22   Ar Automatic Reconciliation              Starting Weight: 310#   Current Weight: 204# (204# last visit one month ago)  Overall Pounds Lost: 106#  Overall Pounds Gained: 0  Goal is 198#, self report 201# today    Exercise/Physical Activity:moved resistance training to midday, cardio exercise end of the day, HIIT program    Is patient using [facility] products:yes  If yes, how many per day:1    Aversions/side effects of product/program:none    Fluids used to mix with products:water    Reported Diet History:cut back on cereal, fruit, and chickpea pasta - now using red lentil pasta if having pasta. 7a-8a ND shake  12-1p Chicken strips, salad or leftovers such as broccoli or cauliflower and chicken  6pm Stew or pasta with protein    After dinner, may have a sandwich after treadmill. Snacking rarely but if so, it is after lunch after exercise - carrots. Other options include Keto chips and hummus  Hunger mostly controlled, but can be hungry after exercise. Beverages: water most often, out to eat may have a cream soda. Not keeping trigger foods in the home.     Barriers/concerns preventing patient from achieving goal(s) since last visit:none    NUTRITION INTERVENTION:  Pt educated on

## 2023-09-26 ENCOUNTER — OFFICE VISIT (OUTPATIENT)
Age: 38
End: 2023-09-26

## 2023-09-26 DIAGNOSIS — E66.09 CLASS 1 OBESITY DUE TO EXCESS CALORIES WITH SERIOUS COMORBIDITY AND BODY MASS INDEX (BMI) OF 30.0 TO 30.9 IN ADULT: Primary | ICD-10-CM

## 2023-09-26 NOTE — PROGRESS NOTES
[facility]  Metabolic Program Follow-up Nutrition Consult    Date: 2023   Physician: Juno Christianson MD  Name: Carolina Archer. :  1985    Type of Plan: LCD one year, maintenance 10 months  Weeks on Plan: 10 months maintenance  Virtual visit was completed through [address] [facility] ASSESSMENT:    Medications/Supplements:   Prior to Admission medications    Medication Sig Start Date End Date Taking? Authorizing Provider   Multiple Vitamin (MULTI VITAMIN MENS PO) Take 2 each by mouth daily Mens daily chewable    Historical Provider, MD   topiramate (TOPAMAX) 25 MG tablet Take 1 tablet by mouth Daily with supper 23   Juno Christianson MD   ammonium lactate (LAC-HYDRIN) 12 % lotion Apply topically as needed 22   Ar Automatic Reconciliation              Starting Weight: 310#   Current Weight: 199# self report for last two weeks (204# last visit one month ago)  Overall Pounds Lost: 111#  Overall Pounds Gained: 0    Exercise/Physical Activity: moved all exercise to after lunch. 3 days week HIIT, cardio 1 day week. Is patient using [employer] products:yes  If yes, how many per day:1    Aversions/side effects of product/program:none    Fluids used to mix with products:water    Reported Diet History:  Trying new foods, incorporating one serving complex carb at least once daily. High protein shrimp bowls with pasta is a new favorite choice. Eating more fiber to help with hunger such as lettuce salads with grilled onion crumbles,.   Feeling most hungry one hour after a workout or one hour after dinner    24 Hour Diet Recall  Breakfast 8am ND shake 200 calories, 27 grams protein   Lunch 12p Shrimp bowl 200 calories , 20 grams protein   Dinner 6pm Cauliflower pizza with pepperoni whole 400 calories, 20 grams   Snacks 3pm Lettuce and fried onions, greek yogurt  100 calories, 10 grams protein   Beverages  water     Barriers/concerns preventing patient from achieving goal(s) since

## 2023-10-05 ENCOUNTER — OFFICE VISIT (OUTPATIENT)
Age: 38
End: 2023-10-05
Payer: COMMERCIAL

## 2023-10-05 VITALS
WEIGHT: 201.5 LBS | DIASTOLIC BLOOD PRESSURE: 67 MMHG | HEIGHT: 68 IN | BODY MASS INDEX: 30.54 KG/M2 | OXYGEN SATURATION: 98 % | RESPIRATION RATE: 16 BRPM | SYSTOLIC BLOOD PRESSURE: 106 MMHG | TEMPERATURE: 97.9 F | HEART RATE: 75 BPM

## 2023-10-05 DIAGNOSIS — E66.09 CLASS 1 OBESITY DUE TO EXCESS CALORIES WITH SERIOUS COMORBIDITY AND BODY MASS INDEX (BMI) OF 30.0 TO 30.9 IN ADULT: Primary | ICD-10-CM

## 2023-10-05 DIAGNOSIS — G47.33 OSA ON CPAP: ICD-10-CM

## 2023-10-05 DIAGNOSIS — E78.00 PURE HYPERCHOLESTEROLEMIA: ICD-10-CM

## 2023-10-05 PROCEDURE — G8427 DOCREV CUR MEDS BY ELIG CLIN: HCPCS | Performed by: FAMILY MEDICINE

## 2023-10-05 PROCEDURE — G8417 CALC BMI ABV UP PARAM F/U: HCPCS | Performed by: FAMILY MEDICINE

## 2023-10-05 PROCEDURE — 99213 OFFICE O/P EST LOW 20 MIN: CPT | Performed by: FAMILY MEDICINE

## 2023-10-05 PROCEDURE — 1036F TOBACCO NON-USER: CPT | Performed by: FAMILY MEDICINE

## 2023-10-05 PROCEDURE — G8484 FLU IMMUNIZE NO ADMIN: HCPCS | Performed by: FAMILY MEDICINE

## 2023-10-05 ASSESSMENT — PATIENT HEALTH QUESTIONNAIRE - PHQ9
SUM OF ALL RESPONSES TO PHQ QUESTIONS 1-9: 0
SUM OF ALL RESPONSES TO PHQ9 QUESTIONS 1 & 2: 0
SUM OF ALL RESPONSES TO PHQ QUESTIONS 1-9: 0
2. FEELING DOWN, DEPRESSED OR HOPELESS: 0
1. LITTLE INTEREST OR PLEASURE IN DOING THINGS: 0

## 2023-11-07 ENCOUNTER — OFFICE VISIT (OUTPATIENT)
Age: 38
End: 2023-11-07

## 2023-11-07 DIAGNOSIS — E66.09 CLASS 1 OBESITY DUE TO EXCESS CALORIES WITH SERIOUS COMORBIDITY AND BODY MASS INDEX (BMI) OF 30.0 TO 30.9 IN ADULT: Primary | ICD-10-CM

## 2023-11-07 NOTE — PROGRESS NOTES
[facility]  Metabolic Program Follow-up Nutrition Consult    Date: 2023   Physician: Thierry Kebede  Name: Maximo Diana. :  1985    Type of Plan: maintenance   Weeks on Plan: 11 months maintenance, LCD one year  Virtual visit was completed through [address] [facility] ASSESSMENT:    Medications/Supplements:   Prior to Admission medications    Medication Sig Start Date End Date Taking? Authorizing Provider   Multiple Vitamin (MULTI VITAMIN MENS PO) Take 2 each by mouth daily Mens daily chewable    Provider, MD Jsose   ammonium lactate (LAC-HYDRIN) 12 % lotion Apply topically as needed 22   Automatic Reconciliation, Ar              Starting Weight: 310#   Current Weight: 199#  Overall Pounds Lost: 111#  Overall Pounds Gained: 0    Exercise/Physical Activity: moved all exercise to after lunch. 3 days week HIIT, cardio 1 day week. Follows an online program.  Tired afterwards. Backing off walking on treadmill. Hip flareup. Has a rest day. Is patient using [employer] products:yes  If yes, how many per day:1    Aversions/side effects of product/program:none    Fluids used to mix with products:water    Reported Diet History:  Routine meal patterns, no skipping, portion and carb focused. When out to eat, gets the to-go box right away, cuts meal in half. 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 Still using pasta alternatives such as red lentil pasta  Snacks: granola bars, greek yogurt, baby carrots, gold fish  Beverages: water intake adequate at least 64 ounces daily. Barriers/concerns preventing patient from achieving goal(s) since last visit:  Is going to pause maintenance program in January. Wife wants to start the program, and finances will not allow both to do it at the same time. He feels confident he can do this on his own.     NUTRITION INTERVENTION:  Pt educated on nutrition recommendations for

## 2024-01-04 ENCOUNTER — OFFICE VISIT (OUTPATIENT)
Age: 39
End: 2024-01-04
Payer: COMMERCIAL

## 2024-01-04 VITALS
HEART RATE: 72 BPM | TEMPERATURE: 98.6 F | BODY MASS INDEX: 31.13 KG/M2 | HEIGHT: 68 IN | OXYGEN SATURATION: 98 % | DIASTOLIC BLOOD PRESSURE: 70 MMHG | RESPIRATION RATE: 16 BRPM | SYSTOLIC BLOOD PRESSURE: 103 MMHG | WEIGHT: 205.4 LBS

## 2024-01-04 DIAGNOSIS — G47.33 OSA ON CPAP: ICD-10-CM

## 2024-01-04 DIAGNOSIS — E66.09 CLASS 1 OBESITY DUE TO EXCESS CALORIES WITH SERIOUS COMORBIDITY AND BODY MASS INDEX (BMI) OF 30.0 TO 30.9 IN ADULT: Primary | ICD-10-CM

## 2024-01-04 DIAGNOSIS — E78.00 PURE HYPERCHOLESTEROLEMIA: ICD-10-CM

## 2024-01-04 DIAGNOSIS — E66.09 CLASS 1 OBESITY DUE TO EXCESS CALORIES WITH SERIOUS COMORBIDITY AND BODY MASS INDEX (BMI) OF 30.0 TO 30.9 IN ADULT: ICD-10-CM

## 2024-01-04 PROCEDURE — 1036F TOBACCO NON-USER: CPT | Performed by: FAMILY MEDICINE

## 2024-01-04 PROCEDURE — G8417 CALC BMI ABV UP PARAM F/U: HCPCS | Performed by: FAMILY MEDICINE

## 2024-01-04 PROCEDURE — G8427 DOCREV CUR MEDS BY ELIG CLIN: HCPCS | Performed by: FAMILY MEDICINE

## 2024-01-04 PROCEDURE — G8484 FLU IMMUNIZE NO ADMIN: HCPCS | Performed by: FAMILY MEDICINE

## 2024-01-04 PROCEDURE — 99213 OFFICE O/P EST LOW 20 MIN: CPT | Performed by: FAMILY MEDICINE

## 2024-01-04 ASSESSMENT — PATIENT HEALTH QUESTIONNAIRE - PHQ9
SUM OF ALL RESPONSES TO PHQ QUESTIONS 1-9: 0
1. LITTLE INTEREST OR PLEASURE IN DOING THINGS: 0
SUM OF ALL RESPONSES TO PHQ QUESTIONS 1-9: 0
2. FEELING DOWN, DEPRESSED OR HOPELESS: 0
SUM OF ALL RESPONSES TO PHQ9 QUESTIONS 1 & 2: 0
SUM OF ALL RESPONSES TO PHQ QUESTIONS 1-9: 0
SUM OF ALL RESPONSES TO PHQ QUESTIONS 1-9: 0

## 2024-01-04 NOTE — PROGRESS NOTES
Identified pt with two pt identifiers (name and ). Reviewed chart in preparation for visit and have obtained necessary documentation.    Demond Kennedy Jr. is a 38 y.o. male  Chief Complaint   Patient presents with    Weight Management     3 month follow up     /70 (Site: Right Upper Arm, Position: Sitting, Cuff Size: Large Adult)   Pulse 72   Temp 98.6 °F (37 °C) (Oral)   Resp 16   Ht 1.727 m (5' 8\")   Wt 93.2 kg (205 lb 6.4 oz)   SpO2 98%   BMI 31.23 kg/m²     1. Have you been to the ER, urgent care clinic since your last visit?  Hospitalized since your last visit?no    2. Have you seen or consulted any other health care providers outside of the [facility] since your last visit?  Include any pap smears or colon screening. No    BMI - 31.1

## 2024-01-04 NOTE — PROGRESS NOTES
[facility] Progress Note:   F/up Physician Visit    CC: Weight Management      Demond Kennedy Jr. is a 38 y.o. male who is here for his  f/up physician visit for the  LCD Program.    Oct 201  Now 205            1/4/2024     8:40 AM 1/4/2024     8:00 AM 10/5/2023     8:44 AM 10/5/2023     8:00 AM 7/20/2023     8:02 AM 7/20/2023     8:00 AM 5/19/2023    11:00 AM   Weight Metrics   Weight 205 lb 6.4 oz  201 lb 8 oz  204 lb 12.8 oz  204 lb   Neck (Inches)  14.5 in  14.25 in  14.25 in    Waist Measure Inches  38.75 in  39.5 in  40.25 in    Body Fat %  27.5 %  27 %  27.8 %    BMI (Calculated) 31.3 kg/m2  30.7 kg/m2  31.2 kg/m2  31.1 kg/m2          No data to display                   Current Outpatient Medications   Medication Sig Dispense Refill    Multiple Vitamin (MULTI VITAMIN MENS PO) Take 2 each by mouth daily Mens daily chewable      ammonium lactate (LAC-HYDRIN) 12 % lotion Apply topically as needed       No current facility-administered medications for this visit.          Participation   Did you attend clinic and class last week? no    Review of Systems  Since your last visit, have you experienced any complications? no  If yes, please list:       Are you taking an appetite suppressant? no  If so, is there any Chest Pain, Palpitations or Dizziness?      HUNGER CONTROL: good    BP Readings from Last 3 Encounters:   01/04/24 103/70   10/05/23 106/67   07/20/23 110/71       SLEEP:7    Have you received any other medical care this week? no  If yes, where and for what?       Have you discontinued or changed any medicine or dose of your medicine since your last visit with Dr Joaquin? no  If yes, where and for what?         Diet  How many ounces of calorie-free liquids did you consume each day?  80 oz    How many meal replacements did you take each day? 1    Did you have any problems adhering to the program?  no If yes, please explain:      Exercise  Aerobic exercise: 60  min  3-4 days a week,

## 2024-01-05 LAB
ALBUMIN SERPL-MCNC: 4.5 G/DL (ref 4.1–5.1)
ALBUMIN/GLOB SERPL: 1.5 {RATIO} (ref 1.2–2.2)
ALP SERPL-CCNC: 88 IU/L (ref 44–121)
ALT SERPL-CCNC: 20 IU/L (ref 0–44)
AST SERPL-CCNC: 29 IU/L (ref 0–40)
BILIRUB SERPL-MCNC: 0.6 MG/DL (ref 0–1.2)
BUN SERPL-MCNC: 14 MG/DL (ref 6–20)
BUN/CREAT SERPL: 17 (ref 9–20)
CALCIUM SERPL-MCNC: 9.5 MG/DL (ref 8.7–10.2)
CHLORIDE SERPL-SCNC: 104 MMOL/L (ref 96–106)
CHOLEST SERPL-MCNC: 159 MG/DL (ref 100–199)
CO2 SERPL-SCNC: 25 MMOL/L (ref 20–29)
CREAT SERPL-MCNC: 0.81 MG/DL (ref 0.76–1.27)
GLOBULIN SER CALC-MCNC: 3 G/DL (ref 1.5–4.5)
GLUCOSE SERPL-MCNC: 85 MG/DL (ref 70–99)
HDLC SERPL-MCNC: 71 MG/DL
LDLC SERPL CALC-MCNC: 78 MG/DL (ref 0–99)
POTASSIUM SERPL-SCNC: 4.3 MMOL/L (ref 3.5–5.2)
PROT SERPL-MCNC: 7.5 G/DL (ref 6–8.5)
SODIUM SERPL-SCNC: 141 MMOL/L (ref 134–144)
TRIGL SERPL-MCNC: 43 MG/DL (ref 0–149)
VLDLC SERPL CALC-MCNC: 10 MG/DL (ref 5–40)

## 2024-02-27 NOTE — PROGRESS NOTES
9/19/2022      Progress Note: Weekly Education Class in the [facility]         Patient is on Very Low Calorie Diet [] (4 meal replacements per day, 800 kcal/day)      Low Calorie Diet [x] (2-3 meal replacements per day, 3446-6347 kcal/day)    1) Did patient have any new symptoms or physical problems? Yes []    No [x]    If yes, check & comment: weakness [], fatigue [], lightheadedness [], headache [], cramps [], cold intolerance [], hair loss [], diarrhea [], constipation [],  NA [] other:                                 2) Has patient had any medical attention from other providers, urgent care or the emergency room this week? Yes []  No [x]       NA [], If yes, why:                                       3) Any other sugar sweetened beverages consumed this week? Yes []  No [x]    4) Did patient have any problems adhering to the diet? Yes []  No [x] NA []    If yes, Vacation [], Celebrations [], Conferences [], Family Reunions [] other:                                                5) How many hours of sleep this week? 5-7    (range)  NA []    Number of meal replacements consumed daily? 2  (range)  NA []    Average ounces of water patient consumed daily this week (not including shakes)? 80     (divide the weekly total by 7)    Did you eat any food outside of the program? Yes [x] No []    Physical Activity Over the Past Week:    Cardio exercise: 240 min  Strength exercise: 2 workouts / week  Number of steps walked per day: 0    How has patient mood overall been this week? Sad [], Happy [], Stressed [], Tired [], Content [], NA [], other NOT ANSWERED             Medications reconciled by nurse Yes [x]  No[]    Patient was given therapeutic recommendations for any noted side effects of their dietary approach based upon [facility] patient manual per providers recommendation.      9/26/2022    Progress Note: Weekly Education Class in the [facility]         Patient is on Very Low Calorie Diet [] (4 meal replacements per day, 800 kcal/day)      Low Calorie Diet [x] (2-3 meal replacements per day, 2325-9228 kcal/day)    1) Did patient have any new symptoms or physical problems? Yes []    No [x]    If yes, check & comment: weakness [], fatigue [], lightheadedness [], headache [], cramps [], cold intolerance [], hair loss [], diarrhea [], constipation [],  NA [] other:                                 2) Has patient had any medical attention from other providers, urgent care or the emergency room this week? Yes []  No [x]       NA [], If yes, why:                                       3) Any other sugar sweetened beverages consumed this week? Yes []  No [x]    4) Did patient have any problems adhering to the diet? Yes []  No [x] NA []    If yes, Vacation [], Celebrations [], Conferences [], Family Reunions [] other:                                                5) How many hours of sleep this week? 5-7    (range)  NA []    Number of meal replacements consumed daily? 2  (range)  NA []    Average ounces of water patient consumed daily this week (not including shakes)? 80     (divide the weekly total by 7)    Did you eat any food outside of the program? Yes [x] No []    Physical Activity Over the Past Week:    Cardio exercise: 60 min  Strength exercise: 3 workouts / week  Number of steps walked per day: 0    How has patient mood overall been this week? Sad [], Happy [], Stressed [], Tired [], Content [], NA [], other NOT ANSWERED             Medications reconciled by nurse Yes [x]  No[]    Patient was given therapeutic recommendations for any noted side effects of their dietary approach based upon [facility] patient manual per providers recommendation. .

## 2024-07-11 ENCOUNTER — OFFICE VISIT (OUTPATIENT)
Age: 39
End: 2024-07-11
Payer: COMMERCIAL

## 2024-07-11 VITALS
TEMPERATURE: 98.6 F | BODY MASS INDEX: 31.6 KG/M2 | WEIGHT: 208.5 LBS | OXYGEN SATURATION: 97 % | RESPIRATION RATE: 16 BRPM | DIASTOLIC BLOOD PRESSURE: 67 MMHG | HEART RATE: 75 BPM | SYSTOLIC BLOOD PRESSURE: 94 MMHG | HEIGHT: 68 IN

## 2024-07-11 DIAGNOSIS — E78.00 PURE HYPERCHOLESTEROLEMIA: ICD-10-CM

## 2024-07-11 DIAGNOSIS — E66.09 CLASS 1 OBESITY DUE TO EXCESS CALORIES WITH SERIOUS COMORBIDITY AND BODY MASS INDEX (BMI) OF 30.0 TO 30.9 IN ADULT: Primary | ICD-10-CM

## 2024-07-11 DIAGNOSIS — G47.33 OSA ON CPAP: ICD-10-CM

## 2024-07-11 PROCEDURE — G8417 CALC BMI ABV UP PARAM F/U: HCPCS | Performed by: FAMILY MEDICINE

## 2024-07-11 PROCEDURE — 99213 OFFICE O/P EST LOW 20 MIN: CPT | Performed by: FAMILY MEDICINE

## 2024-07-11 PROCEDURE — 1036F TOBACCO NON-USER: CPT | Performed by: FAMILY MEDICINE

## 2024-07-11 PROCEDURE — G8427 DOCREV CUR MEDS BY ELIG CLIN: HCPCS | Performed by: FAMILY MEDICINE

## 2024-07-11 ASSESSMENT — ANXIETY QUESTIONNAIRES
6. BECOMING EASILY ANNOYED OR IRRITABLE: NOT AT ALL
GAD7 TOTAL SCORE: 0
6. BECOMING EASILY ANNOYED OR IRRITABLE: NOT AT ALL
3. WORRYING TOO MUCH ABOUT DIFFERENT THINGS: NOT AT ALL
4. TROUBLE RELAXING: NOT AT ALL
4. TROUBLE RELAXING: NOT AT ALL
IF YOU CHECKED OFF ANY PROBLEMS ON THIS QUESTIONNAIRE, HOW DIFFICULT HAVE THESE PROBLEMS MADE IT FOR YOU TO DO YOUR WORK, TAKE CARE OF THINGS AT HOME, OR GET ALONG WITH OTHER PEOPLE: NOT DIFFICULT AT ALL
5. BEING SO RESTLESS THAT IT IS HARD TO SIT STILL: NOT AT ALL
7. FEELING AFRAID AS IF SOMETHING AWFUL MIGHT HAPPEN: NOT AT ALL
1. FEELING NERVOUS, ANXIOUS, OR ON EDGE: NOT AT ALL
2. NOT BEING ABLE TO STOP OR CONTROL WORRYING: NOT AT ALL
2. NOT BEING ABLE TO STOP OR CONTROL WORRYING: NOT AT ALL
3. WORRYING TOO MUCH ABOUT DIFFERENT THINGS: NOT AT ALL
5. BEING SO RESTLESS THAT IT IS HARD TO SIT STILL: NOT AT ALL
7. FEELING AFRAID AS IF SOMETHING AWFUL MIGHT HAPPEN: NOT AT ALL
IF YOU CHECKED OFF ANY PROBLEMS ON THIS QUESTIONNAIRE, HOW DIFFICULT HAVE THESE PROBLEMS MADE IT FOR YOU TO DO YOUR WORK, TAKE CARE OF THINGS AT HOME, OR GET ALONG WITH OTHER PEOPLE: NOT DIFFICULT AT ALL
1. FEELING NERVOUS, ANXIOUS, OR ON EDGE: NOT AT ALL
GAD7 TOTAL SCORE: 0

## 2024-07-11 ASSESSMENT — PATIENT HEALTH QUESTIONNAIRE - PHQ9
SUM OF ALL RESPONSES TO PHQ QUESTIONS 1-9: 0
SUM OF ALL RESPONSES TO PHQ9 QUESTIONS 1 & 2: 0
2. FEELING DOWN, DEPRESSED OR HOPELESS: NOT AT ALL
1. LITTLE INTEREST OR PLEASURE IN DOING THINGS: NOT AT ALL
SUM OF ALL RESPONSES TO PHQ QUESTIONS 1-9: 0
1. LITTLE INTEREST OR PLEASURE IN DOING THINGS: NOT AT ALL
SUM OF ALL RESPONSES TO PHQ QUESTIONS 1-9: 0
2. FEELING DOWN, DEPRESSED OR HOPELESS: NOT AT ALL
SUM OF ALL RESPONSES TO PHQ QUESTIONS 1-9: 0
SUM OF ALL RESPONSES TO PHQ9 QUESTIONS 1 & 2: 0

## 2024-07-11 NOTE — PROGRESS NOTES
Identified patient with two patient identifiers (name and ). Reviewed chart in preparation for visit and have obtained necessary documentation.    Demond Kennedy Jr. is a 39 y.o. male  Chief Complaint   Patient presents with    Follow-up    Weight Management     6 month follow up      BP 94/67 (Site: Left Upper Arm, Position: Sitting, Cuff Size: Medium Adult)   Pulse 75   Temp 98.6 °F (37 °C) (Oral)   Resp 16   Ht 1.727 m (5' 8\")   Wt 94.6 kg (208 lb 8 oz)   SpO2 97%   BMI 31.70 kg/m²     1. Have you been to the ER, urgent care clinic since your last visit?  Hospitalized since your last visit?no    2. Have you seen or consulted any other health care providers outside of the [facility] since your last visit?  Include any pap smears or colon screening. no  
Premier prot 1  a day    Did you have any problems adhering to the program?  no If yes, please explain:      Exercise  Aerobic exercise: 3-4 days 60 or more min   at home  Resistance exercise: 3-4 workouts / week  Any discomfort?  no     If yes, where?      Objective  BP 94/67 (Site: Left Upper Arm, Position: Sitting, Cuff Size: Medium Adult)   Pulse 75   Temp 98.6 °F (37 °C) (Oral)   Resp 16   Ht 1.727 m (5' 8\")   Wt 94.6 kg (208 lb 8 oz)   SpO2 97%   BMI 31.70 kg/m²   No LMP for male patient.      Physical Exam  Appearance: well,   Mental:A&O x 3, NAD  H:NC/AT,  EENT:   EOMI, PERRL, No scleral icterus  Neck: no bruit or JVD  CV: RRR no M/R/G  Lung: clear, No W/R  ABD: soft, active, nontender  Ext:  no Edema  Neuro: nonfocal  Assessment / Plan    Demond Kennedy Jr. was seen today for Follow-up and Weight Management (6 month follow up )       1. Class 1 obesity due to excess calories with serious comorbidity and body mass index (BMI) of 30.0 to 30.9 in adult  Now on maintenance and has been  doing great, this time has regained 3 lbs-   he just got back from vacation. He is usually 205-207   Continue what he is doing now   Self monitor weight weekly   Keep tracking meals  Rto 3 months  I counseled him for more than 50% of this more than 20 min vsit on the options for appetite suppression also discussed the possibility of mindless carbs. Document evrything  2. Pure hypercholesterolemia    Lifestyle change is the treatment plan    3. VIDA on CPAP   Continue cpap    1.  Weight management stable   Progress was reviewed with patient    2.  Labs    Latest results reviewed with patient   Lab slip given to pt for f/up  labs      face to face time with Demond consisted of counseling & coordinating and/or discussing treatment plans in reference to his obesity The primary encounter diagnosis was Class 1 obesity due to excess calories with serious comorbidity and body mass index (BMI) of 30.0 to 30.9 in adult. Diagnoses

## 2024-11-29 SDOH — HEALTH STABILITY: PHYSICAL HEALTH: ON AVERAGE, HOW MANY MINUTES DO YOU ENGAGE IN EXERCISE AT THIS LEVEL?: 60 MIN

## 2024-11-29 SDOH — HEALTH STABILITY: PHYSICAL HEALTH: ON AVERAGE, HOW MANY DAYS PER WEEK DO YOU ENGAGE IN MODERATE TO STRENUOUS EXERCISE (LIKE A BRISK WALK)?: 4 DAYS

## 2024-12-10 SDOH — HEALTH STABILITY: PHYSICAL HEALTH: ON AVERAGE, HOW MANY MINUTES DO YOU ENGAGE IN EXERCISE AT THIS LEVEL?: 60 MIN

## 2024-12-10 SDOH — HEALTH STABILITY: PHYSICAL HEALTH: ON AVERAGE, HOW MANY DAYS PER WEEK DO YOU ENGAGE IN MODERATE TO STRENUOUS EXERCISE (LIKE A BRISK WALK)?: 4 DAYS

## 2024-12-11 ENCOUNTER — OFFICE VISIT (OUTPATIENT)
Age: 39
End: 2024-12-11
Payer: COMMERCIAL

## 2024-12-11 VITALS
WEIGHT: 218 LBS | BODY MASS INDEX: 32.29 KG/M2 | DIASTOLIC BLOOD PRESSURE: 80 MMHG | SYSTOLIC BLOOD PRESSURE: 132 MMHG | OXYGEN SATURATION: 100 % | HEART RATE: 84 BPM | HEIGHT: 69 IN | RESPIRATION RATE: 18 BRPM

## 2024-12-11 DIAGNOSIS — R00.2 HEART PALPITATIONS: ICD-10-CM

## 2024-12-11 DIAGNOSIS — Z12.5 SCREENING FOR MALIGNANT NEOPLASM OF PROSTATE: ICD-10-CM

## 2024-12-11 DIAGNOSIS — Z76.89 ENCOUNTER TO ESTABLISH CARE: ICD-10-CM

## 2024-12-11 DIAGNOSIS — R04.0 BLEEDING FROM THE NOSE: ICD-10-CM

## 2024-12-11 DIAGNOSIS — Z76.89 ENCOUNTER TO ESTABLISH CARE: Primary | ICD-10-CM

## 2024-12-11 DIAGNOSIS — M79.671 PAIN OF RIGHT HEEL: ICD-10-CM

## 2024-12-11 LAB
BASOPHILS # BLD AUTO: 0 X10E3/UL (ref 0–0.2)
BASOPHILS NFR BLD AUTO: 1 %
EOSINOPHIL # BLD AUTO: 0.2 X10E3/UL (ref 0–0.4)
EOSINOPHIL NFR BLD AUTO: 4 %
ERYTHROCYTE [DISTWIDTH] IN BLOOD BY AUTOMATED COUNT: 17.9 % (ref 11.6–15.4)
HCT VFR BLD AUTO: 37.5 % (ref 37.5–51)
HGB BLD-MCNC: 11 G/DL (ref 13–17.7)
IMM GRANULOCYTES # BLD AUTO: 0 X10E3/UL (ref 0–0.1)
IMM GRANULOCYTES NFR BLD AUTO: 0 %
LYMPHOCYTES # BLD AUTO: 1.1 X10E3/UL (ref 0.7–3.1)
LYMPHOCYTES NFR BLD AUTO: 25 %
MCH RBC QN AUTO: 19.8 PG (ref 26.6–33)
MCHC RBC AUTO-ENTMCNC: 29.3 G/DL (ref 31.5–35.7)
MCV RBC AUTO: 67 FL (ref 79–97)
MONOCYTES # BLD AUTO: 0.4 X10E3/UL (ref 0.1–0.9)
MONOCYTES NFR BLD AUTO: 10 %
NEUTROPHILS # BLD AUTO: 2.6 X10E3/UL (ref 1.4–7)
NEUTROPHILS NFR BLD AUTO: 60 %
PLATELET # BLD AUTO: 181 X10E3/UL (ref 150–450)
RBC # BLD AUTO: 5.56 X10E6/UL (ref 4.14–5.8)
WBC # BLD AUTO: 4.3 X10E3/UL (ref 3.4–10.8)

## 2024-12-11 PROCEDURE — G8417 CALC BMI ABV UP PARAM F/U: HCPCS | Performed by: CLINICAL NURSE SPECIALIST

## 2024-12-11 PROCEDURE — G8427 DOCREV CUR MEDS BY ELIG CLIN: HCPCS | Performed by: CLINICAL NURSE SPECIALIST

## 2024-12-11 PROCEDURE — 99204 OFFICE O/P NEW MOD 45 MIN: CPT | Performed by: CLINICAL NURSE SPECIALIST

## 2024-12-11 PROCEDURE — G8484 FLU IMMUNIZE NO ADMIN: HCPCS | Performed by: CLINICAL NURSE SPECIALIST

## 2024-12-11 PROCEDURE — 1036F TOBACCO NON-USER: CPT | Performed by: CLINICAL NURSE SPECIALIST

## 2024-12-11 RX ORDER — DICLOFENAC SODIUM 75 MG/1
75 TABLET, DELAYED RELEASE ORAL 2 TIMES DAILY
Qty: 30 TABLET | Refills: 0 | Status: SHIPPED | OUTPATIENT
Start: 2024-12-11 | End: 2024-12-26

## 2024-12-11 SDOH — ECONOMIC STABILITY: FOOD INSECURITY: WITHIN THE PAST 12 MONTHS, YOU WORRIED THAT YOUR FOOD WOULD RUN OUT BEFORE YOU GOT MONEY TO BUY MORE.: NEVER TRUE

## 2024-12-11 SDOH — ECONOMIC STABILITY: FOOD INSECURITY: WITHIN THE PAST 12 MONTHS, THE FOOD YOU BOUGHT JUST DIDN'T LAST AND YOU DIDN'T HAVE MONEY TO GET MORE.: NEVER TRUE

## 2024-12-11 SDOH — ECONOMIC STABILITY: INCOME INSECURITY: HOW HARD IS IT FOR YOU TO PAY FOR THE VERY BASICS LIKE FOOD, HOUSING, MEDICAL CARE, AND HEATING?: NOT VERY HARD

## 2024-12-11 NOTE — PROGRESS NOTES
Demond Kennedy Jr. (:  1985) is a 39 y.o. male,New patient, here for evaluation of the following chief complaint(s):  New Patient         Assessment & Plan  Encounter to establish care    Reviewed all age and gender appropriate preventative health. Baseline labs on today.     Orders:    Basic Metabolic Panel; Future    CBC with Auto Differential; Future    Heart palpitations    Does not appear to be associated with lifestyle. Will rule out hypothyroidism and anemia.     Orders:    TSH; Future    Bleeding from the nose    Encouraged to obtain a humidifer as well as to utilize saline nasal spray prior to going to bed.          Pain of right heel    Question plantar fascitis vs achilles tendinopathy. Trial diclofenac BID x 14 days, discussed indication, use, and potential side effects. Encouraged to apply ice 4 times daily.        Screening for malignant neoplasm of prostate    Orders:    PSA Screening; Future    Will follow up pending test results and discuss any additional plan of care.   Encouraged to call with any questions or concerns in the interim.   Return in about 1 year (around 2025), or if symptoms worsen or fail to improve.       Subjective   Demond presents to establish care. States that he is generally doing well. Histories as documented. States that he has a couple of concerns. Since the beginning of the year had been experiencing palpitations every few days however about two months ago they spontaneously resolved. Initially noted them with exercising and then they started to occur at rest also. He does not consume caffeine. Has had a normal cardiac evaluation in the past. He has recently been experiencing \"nosebleeds,\" when he blows his nose has noted blood on the tissue. Does sleep with a CPAP that has a humidifier attachment.         Review of Systems   Constitutional:  Negative for fatigue and fever.   HENT:  Positive for nosebleeds.    Respiratory:  Negative for shortness of

## 2024-12-11 NOTE — PROGRESS NOTES
Chief Complaint   Patient presents with    New Patient     Has hx of Betathalssemia    \"Have you been to the ER, urgent care clinic since your last visit?  Hospitalized since your last visit?\"    NO    “Have you seen or consulted any other health care providers outside our system since your last visit?”    NO

## 2024-12-12 LAB
BUN SERPL-MCNC: 14 MG/DL (ref 6–24)
BUN/CREAT SERPL: 19 (ref 9–20)
CALCIUM SERPL-MCNC: 9.4 MG/DL (ref 8.7–10.2)
CHLORIDE SERPL-SCNC: 104 MMOL/L (ref 96–106)
CO2 SERPL-SCNC: 23 MMOL/L (ref 20–29)
CREAT SERPL-MCNC: 0.75 MG/DL (ref 0.76–1.27)
EGFRCR SERPLBLD CKD-EPI 2021: 117 ML/MIN/1.73
GLUCOSE SERPL-MCNC: 83 MG/DL (ref 70–99)
POTASSIUM SERPL-SCNC: 4 MMOL/L (ref 3.5–5.2)
PSA SERPL-MCNC: 0.4 NG/ML (ref 0–4)
SODIUM SERPL-SCNC: 142 MMOL/L (ref 134–144)
TSH SERPL DL<=0.005 MIU/L-ACNC: 2.22 UIU/ML (ref 0.45–4.5)

## 2024-12-12 ASSESSMENT — ENCOUNTER SYMPTOMS: SHORTNESS OF BREATH: 0

## 2024-12-13 LAB — SPECIMEN STATUS REPORT: NORMAL

## 2024-12-14 LAB
IRON SATN MFR SERPL: 37 % (ref 15–55)
IRON SERPL-MCNC: 80 UG/DL (ref 38–169)
TIBC SERPL-MCNC: 217 UG/DL (ref 250–450)
UIBC SERPL-MCNC: 137 UG/DL (ref 111–343)

## 2024-12-16 ENCOUNTER — TELEPHONE (OUTPATIENT)
Age: 39
End: 2024-12-16

## 2024-12-16 NOTE — TELEPHONE ENCOUNTER
Demond Kennedy Jr. was called with no answer, message on VM left to call back regarding note below.     ----- Message from NICKI Ng CNP sent at 12/15/2024 10:05 PM EST -----  Please advise that overall labs are stable.  Please confirm no signs or symptoms of bleeding as he does have mild anemia.  Please encouraged to increase dietary intake of iron and calcium.  Repeat CBC in about 3 months.

## 2025-01-09 ENCOUNTER — TELEMEDICINE (OUTPATIENT)
Age: 40
End: 2025-01-09
Payer: COMMERCIAL

## 2025-01-09 VITALS
OXYGEN SATURATION: 98 % | TEMPERATURE: 97.7 F | RESPIRATION RATE: 18 BRPM | HEART RATE: 76 BPM | WEIGHT: 209.8 LBS | DIASTOLIC BLOOD PRESSURE: 77 MMHG | BODY MASS INDEX: 31.07 KG/M2 | HEIGHT: 69 IN | SYSTOLIC BLOOD PRESSURE: 113 MMHG

## 2025-01-09 DIAGNOSIS — E66.811 CLASS 1 OBESITY DUE TO EXCESS CALORIES WITH SERIOUS COMORBIDITY AND BODY MASS INDEX (BMI) OF 30.0 TO 30.9 IN ADULT: Primary | ICD-10-CM

## 2025-01-09 DIAGNOSIS — E78.00 PURE HYPERCHOLESTEROLEMIA: ICD-10-CM

## 2025-01-09 DIAGNOSIS — G47.33 OSA ON CPAP: ICD-10-CM

## 2025-01-09 DIAGNOSIS — E66.09 CLASS 1 OBESITY DUE TO EXCESS CALORIES WITH SERIOUS COMORBIDITY AND BODY MASS INDEX (BMI) OF 30.0 TO 30.9 IN ADULT: Primary | ICD-10-CM

## 2025-01-09 PROCEDURE — G8427 DOCREV CUR MEDS BY ELIG CLIN: HCPCS | Performed by: FAMILY MEDICINE

## 2025-01-09 PROCEDURE — 1036F TOBACCO NON-USER: CPT | Performed by: FAMILY MEDICINE

## 2025-01-09 PROCEDURE — 99213 OFFICE O/P EST LOW 20 MIN: CPT | Performed by: FAMILY MEDICINE

## 2025-01-09 PROCEDURE — G8417 CALC BMI ABV UP PARAM F/U: HCPCS | Performed by: FAMILY MEDICINE

## 2025-01-09 ASSESSMENT — PATIENT HEALTH QUESTIONNAIRE - PHQ9
SUM OF ALL RESPONSES TO PHQ9 QUESTIONS 1 & 2: 0
SUM OF ALL RESPONSES TO PHQ QUESTIONS 1-9: 0
SUM OF ALL RESPONSES TO PHQ QUESTIONS 1-9: 0
2. FEELING DOWN, DEPRESSED OR HOPELESS: NOT AT ALL
1. LITTLE INTEREST OR PLEASURE IN DOING THINGS: NOT AT ALL
SUM OF ALL RESPONSES TO PHQ QUESTIONS 1-9: 0
SUM OF ALL RESPONSES TO PHQ QUESTIONS 1-9: 0

## 2025-01-09 ASSESSMENT — PAIN SCALES - GENERAL: PAINLEVEL_OUTOF10: 0

## 2025-01-09 NOTE — PROGRESS NOTES
VERY LOW CALORIE DIET  MAINTENANCE      His healthcare decision maker is aware that this patient-initiated Telehealth encounter is a billable service, with coverage as determined by his insurance carrier. He is aware that he may receive a bill and has provided verbal consent to proceed: Yes      was evaluated through a synchronous (real-time) audio-video encounter. The patient (or guardian if applicable) is aware that this is a billable service, which includes applicable co-pays. This Virtual Visit was conducted with patient's (and/or legal guardian's) consent. Patient identification was verified, and a caregiver was present when appropriate.   The patient was located at Home: [address] [place] [id number]  Provider was located at Home (Appt Dept State): [place]  Confirm you are appropriately licensed, registered, or certified to deliver care in the state where the patient is located as indicated above. If you are not or unsure, please re-schedule the visit: Yes, I confirm.         CC: Weight Management      Demond Kennedy Jr. is a 39 y.o. male who is here for his  f/up physician visit for the VLCD / LCD Program.    July 208  Now 209    He says before the holiday he was stable in 206-207  His percent body fat is lower than it was in July 1/9/2025     9:00 AM 1/9/2025     8:45 AM 12/11/2024    10:24 AM 7/11/2024     9:02 AM 7/11/2024     9:00 AM 1/4/2024     8:40 AM 1/4/2024     8:00 AM   Weight Metrics   Weight  209 lb 12.8 oz 218 lb 208 lb 8 oz  205 lb 6.4 oz    Neck (Inches) 14.25 in    14 in  14.5 in   Waist Measure Inches 39.5 in    43 in  38.75 in   Body Fat % 27.7 %    28.6 %  27.5 %   BMI (Calculated)  31 kg/m2 32.3 kg/m2 31.8 kg/m2  31.3 kg/m2           No data to display                   No current outpatient medications on file.     No current facility-administered medications for this visit.         Participation   Did you attend clinic and class last week? no    Review of

## 2025-01-09 NOTE — PROGRESS NOTES
Identified pt with two pt identifiers (name and ). Reviewed chart in preparation for visit and have obtained necessary documentation.    Demond Kennedy Jr. is a 39 y.o. male  Chief Complaint   Patient presents with    Weight Management     6 month follow up     /77   Pulse 76   Temp 97.7 °F (36.5 °C) (Oral)   Resp 18   Ht 1.753 m (5' 9\")   Wt 95.2 kg (209 lb 12.8 oz)   SpO2 98%   BMI 30.98 kg/m²     1. Have you been to the ER, urgent care clinic since your last visit?  Hospitalized since your last visit?no    2. Have you seen or consulted any other health care providers outside of the [facility] since your last visit?  Include any pap smears or colon screening. No    BMI - 30.7

## 2025-08-19 ENCOUNTER — TELEPHONE (OUTPATIENT)
Age: 40
End: 2025-08-19